# Patient Record
Sex: MALE | Race: WHITE | NOT HISPANIC OR LATINO | Employment: OTHER | URBAN - METROPOLITAN AREA
[De-identification: names, ages, dates, MRNs, and addresses within clinical notes are randomized per-mention and may not be internally consistent; named-entity substitution may affect disease eponyms.]

---

## 2022-03-08 ENCOUNTER — OFFICE VISIT (OUTPATIENT)
Dept: INTERNAL MEDICINE CLINIC | Facility: CLINIC | Age: 47
End: 2022-03-08
Payer: COMMERCIAL

## 2022-03-08 VITALS
HEART RATE: 83 BPM | HEIGHT: 69 IN | BODY MASS INDEX: 35.1 KG/M2 | OXYGEN SATURATION: 95 % | DIASTOLIC BLOOD PRESSURE: 60 MMHG | TEMPERATURE: 97.3 F | WEIGHT: 237 LBS | RESPIRATION RATE: 18 BRPM | SYSTOLIC BLOOD PRESSURE: 100 MMHG

## 2022-03-08 DIAGNOSIS — E11.9 TYPE 2 DIABETES MELLITUS WITHOUT COMPLICATION, WITHOUT LONG-TERM CURRENT USE OF INSULIN (HCC): Primary | ICD-10-CM

## 2022-03-08 DIAGNOSIS — E78.5 HYPERLIPIDEMIA, UNSPECIFIED HYPERLIPIDEMIA TYPE: ICD-10-CM

## 2022-03-08 DIAGNOSIS — M94.0 COSTOCHONDRITIS: ICD-10-CM

## 2022-03-08 DIAGNOSIS — G47.33 OBSTRUCTIVE SLEEP APNEA: ICD-10-CM

## 2022-03-08 DIAGNOSIS — F41.9 ANXIETY: ICD-10-CM

## 2022-03-08 PROCEDURE — 3725F SCREEN DEPRESSION PERFORMED: CPT | Performed by: INTERNAL MEDICINE

## 2022-03-08 PROCEDURE — 99204 OFFICE O/P NEW MOD 45 MIN: CPT | Performed by: INTERNAL MEDICINE

## 2022-03-08 PROCEDURE — 3008F BODY MASS INDEX DOCD: CPT | Performed by: INTERNAL MEDICINE

## 2022-03-08 PROCEDURE — 1036F TOBACCO NON-USER: CPT | Performed by: INTERNAL MEDICINE

## 2022-03-08 RX ORDER — METFORMIN HYDROCHLORIDE 1000 MG/1
1000 TABLET, FILM COATED, EXTENDED RELEASE ORAL
COMMUNITY
End: 2022-06-28 | Stop reason: ALTCHOICE

## 2022-03-08 RX ORDER — ATORVASTATIN CALCIUM 20 MG/1
20 TABLET, FILM COATED ORAL DAILY
COMMUNITY

## 2022-03-08 RX ORDER — VENLAFAXINE HYDROCHLORIDE 37.5 MG/1
37.5 CAPSULE, EXTENDED RELEASE ORAL DAILY
COMMUNITY
Start: 2021-11-26

## 2022-03-08 NOTE — PROGRESS NOTES
INTERNAL MEDICINE OFFICE VISIT  [facility] Internal Medicine- [place]    NAME: Lawrence Fontaine  AGE: 42 y o  SEX: male    DATE OF ENCOUNTER: 3/8/2022    Assessment and Plan     1  Type 2 diabetes mellitus without complication, without long-term current use of insulin ([facility] [address] )  - Patient reports has been diabetic for several years now and is currently on metformin    - Last hemoglobin A1C 5 6, will recheck for next office visit  - Diabetic foot exam completed today   - Referred to ophthalmology for up to date eye exam      - Ambulatory Referral to Ophthalmology; Future  - Comprehensive metabolic panel; Future  - Hemoglobin A1C; Future  - Lipid panel; Future  - CBC and differential; Future  - Microalbumin / creatinine urine ratio    2  Hyperlipidemia, unspecified hyperlipidemia type  - Notes his diet has been poor, however, continues to exercise regularly     3  Anxiety  - Continues venlafaxine, which he started last year due to anxiety    - Reports anxiety is well controlled at this time  4  Costochondritis  - Recently seen in the ED about a month ago at [facility] for complaints of chest pain and swelling at the xiphoid process  Chest X-ray was negative for any acute disease process  Determined discomfort likely musculoskeletal in nature  Was recommended NSAIDs for swelling    - Patient reports he has had swelling at the xiphoid process for multiple years but has recently worsened since he starting working out regularly a couple of months ago  - Started using angie and tumeric supplements for inflammation    - Denies any current discomfort, swelling evident on exam   Could be related to costochondritis  Continue with conservative management    5  Obstructive sleep apnea   - Per chart review, patient previously following at [facility] with mention of YURIY with CPAP use  - Did not address at visit today, will follow-up at next office visit      BMI Counseling: Body mass index is 35 kg/m²   The BMI is above normal  Nutrition recommendations include decreasing portion sizes, encouraging healthy choices of fruits and vegetables, moderation in carbohydrate intake and increasing intake of lean protein  Exercise recommendations include moderate physical activity 150 minutes/week  Rationale for BMI follow-up plan is due to patient being overweight or obese  Depression Screening and Follow-up Plan: Patient was screened for depression during today's encounter  They screened negative with a PHQ-2 score of 0  No orders of the defined types were placed in this encounter  Chief Complaint     Chief Complaint   Patient presents with    [facility]     HIV/ HEP C        History of Present Illness     Patient presents to establish care, medical history of DM2, hyperlipidemia, and anxiety  States he has not seen a consistent PCP for quite some time  Overall, denies any major concerns today  He is self-employed  Currently  with 3 sons  Denies any known allergies  Denies any drug or alcohol use  Quit smoking about 8 years ago  Reports father passed way from emphysema and mother passed away from liver disease, family history of heart disease and diabetes  Reports a history of "skin lupus" about 10 years ago for which he received steroid injections and no recurrence since    The following portions of the patient's history were reviewed and updated as appropriate: allergies, current medications, past family history, past medical history, past social history, past surgical history and problem list     Review of Systems     10 point ROS negative except per HPI    Active Problem List   There is no problem list on file for this patient        Objective     /60 (BP Location: Left arm, Patient Position: Sitting, Cuff Size: Standard)   Pulse 83   Temp (!) 97 3 °F (36 3 °C)   Resp 18   Ht 5' 9" (1 753 m)   Wt 108 kg (237 lb)   SpO2 95%   BMI 35 00 kg/m²     Physical Exam  Constitutional:       General: He is not in acute distress  Appearance: Normal appearance  He is not ill-appearing  HENT:      Head: Normocephalic and atraumatic  Right Ear: External ear normal       Left Ear: External ear normal    Eyes:      General:         Right eye: No discharge  Left eye: No discharge  Cardiovascular:      Rate and Rhythm: Normal rate and regular rhythm  Pulses: no weak pulses          Dorsalis pedis pulses are 2+ on the right side and 2+ on the left side  Heart sounds: Normal heart sounds  No murmur heard  Pulmonary:      Effort: Pulmonary effort is normal  No respiratory distress  Breath sounds: Normal breath sounds  No wheezing  Abdominal:      General: Abdomen is flat  Palpations: Abdomen is soft  Musculoskeletal:         General: No swelling or tenderness  Normal range of motion  Feet:      Right foot:      Skin integrity: No ulcer, skin breakdown, erythema, warmth, callus or dry skin  Left foot:      Skin integrity: No ulcer, skin breakdown, erythema, warmth, callus or dry skin  Skin:     General: Skin is warm and dry  Findings: No erythema  Neurological:      Mental Status: He is alert and oriented to person, place, and time  Mental status is at baseline  Psychiatric:         Mood and Affect: Mood normal          Behavior: Behavior normal          Patient's shoes and socks removed  Right Foot/Ankle   Right Foot Inspection  Skin Exam: skin normal and skin intact  No dry skin, no warmth, no callus, no erythema, no maceration, no abnormal color, no pre-ulcer, no ulcer and no callus  Toe Exam: ROM and strength within normal limits  Sensory   Vibration: intact  Monofilament testing: intact    Vascular  The right DP pulse is 2+  Left Foot/Ankle  Left Foot Inspection  Skin Exam: skin normal and skin intact  No dry skin, no warmth, no erythema, no maceration, normal color, no pre-ulcer, no ulcer and no callus       Toe Exam: ROM and strength within normal limits  Sensory   Vibration: intact  Monofilament testing: intact    Vascular  The left DP pulse is 2+  Assign Risk Category  No deformity present  No loss of protective sensation  No weak pulses  Risk: 0      Pertinent Laboratory/Diagnostic Studies:  Patient was never admitted  Images and diagnostics reviewed     Current Medications     Current Outpatient Medications:     atorvastatin (LIPITOR) 20 mg tablet, Take 20 mg by mouth daily, Disp: , Rfl:     metFORMIN (GLUMETZA) 1000 MG (MOD) 24 hr tablet, Take 1,000 mg by mouth, Disp: , Rfl:     venlafaxine (EFFEXOR-XR) 37 5 mg 24 hr capsule, Take 37 5 mg by mouth daily, Disp: , Rfl:     Health Maintenance     Health Maintenance   Topic Date Due    Hepatitis C Screening  Never done    HIV Screening  Never done    BMI: Followup Plan  Never done    Annual Physical  Never done    Depression Screening  03/08/2023    BMI: Adult  03/08/2023    DTaP,Tdap,and Td Vaccines (3 - Td or Tdap) 09/21/2025    Influenza Vaccine  Completed    COVID-19 Vaccine  Completed    Pneumococcal Vaccine: Pediatrics (0 to 5 Years) and At-Risk Patients (6 to 59 Years)  Aged Out    HIB Vaccine  Aged Out    Hepatitis B Vaccine  Aged Out    IPV Vaccine  Aged Out    Hepatitis A Vaccine  Aged Out    Meningococcal ACWY Vaccine  Aged Out    HPV Vaccine  Aged [employer] History   Administered Date(s) Administered    COVID-19 PFIZER VACCINE 0 3 ML IM 03/25/2021, 04/22/2021, 10/15/2021    Hep A, adult 09/21/2015, 05/23/2016    Hep B, adult 03/01/2016, 05/23/2016, 03/13/2017    INFLUENZA 12/30/2016, 09/20/2017, 11/26/2021    Influenza, seasonal, injectable 12/29/2010, 10/07/2011, 11/08/2013, 12/13/2014, 09/21/2015    Pneumococcal Polysaccharide PPV23 11/26/2021    Tdap 09/12/2007, 09/21/2015       JESSICA Horowitz  Internal Medicine [address]  [facility], [address] [facility]  Office: [phone]

## 2022-05-17 ENCOUNTER — OFFICE VISIT (OUTPATIENT)
Dept: INTERNAL MEDICINE CLINIC | Facility: CLINIC | Age: 47
End: 2022-05-17
Payer: COMMERCIAL

## 2022-05-17 ENCOUNTER — APPOINTMENT (OUTPATIENT)
Dept: LAB | Facility: HOSPITAL | Age: 47
End: 2022-05-17
Attending: INTERNAL MEDICINE
Payer: COMMERCIAL

## 2022-05-17 VITALS
TEMPERATURE: 97.4 F | BODY MASS INDEX: 35.99 KG/M2 | WEIGHT: 243 LBS | DIASTOLIC BLOOD PRESSURE: 66 MMHG | SYSTOLIC BLOOD PRESSURE: 112 MMHG | HEIGHT: 69 IN | OXYGEN SATURATION: 96 % | HEART RATE: 72 BPM | RESPIRATION RATE: 18 BRPM

## 2022-05-17 DIAGNOSIS — E11.9 TYPE 2 DIABETES MELLITUS WITHOUT COMPLICATION, WITHOUT LONG-TERM CURRENT USE OF INSULIN (HCC): ICD-10-CM

## 2022-05-17 DIAGNOSIS — Z11.3 ROUTINE SCREENING FOR STI (SEXUALLY TRANSMITTED INFECTION): ICD-10-CM

## 2022-05-17 DIAGNOSIS — E66.09 CLASS 2 OBESITY DUE TO EXCESS CALORIES WITHOUT SERIOUS COMORBIDITY WITH BODY MASS INDEX (BMI) OF 35.0 TO 35.9 IN ADULT: ICD-10-CM

## 2022-05-17 DIAGNOSIS — E11.9 TYPE 2 DIABETES MELLITUS WITHOUT COMPLICATION, WITHOUT LONG-TERM CURRENT USE OF INSULIN (HCC): Primary | ICD-10-CM

## 2022-05-17 LAB
ALBUMIN SERPL BCP-MCNC: 3.9 G/DL (ref 3.5–5)
ALP SERPL-CCNC: 92 U/L (ref 46–116)
ALT SERPL W P-5'-P-CCNC: 36 U/L (ref 12–78)
ANION GAP SERPL CALCULATED.3IONS-SCNC: 7 MMOL/L (ref 4–13)
AST SERPL W P-5'-P-CCNC: 24 U/L (ref 5–45)
BASOPHILS # BLD AUTO: 0.04 THOUSANDS/ΜL (ref 0–0.1)
BASOPHILS NFR BLD AUTO: 1 % (ref 0–1)
BILIRUB SERPL-MCNC: 0.34 MG/DL (ref 0.2–1)
BUN SERPL-MCNC: 9 MG/DL (ref 5–25)
CALCIUM SERPL-MCNC: 8.8 MG/DL (ref 8.3–10.1)
CHLORIDE SERPL-SCNC: 104 MMOL/L (ref 100–108)
CHOLEST SERPL-MCNC: 193 MG/DL
CO2 SERPL-SCNC: 29 MMOL/L (ref 21–32)
CREAT SERPL-MCNC: 1.01 MG/DL (ref 0.6–1.3)
CREAT UR-MCNC: 116 MG/DL
EOSINOPHIL # BLD AUTO: 0.26 THOUSAND/ΜL (ref 0–0.61)
EOSINOPHIL NFR BLD AUTO: 4 % (ref 0–6)
ERYTHROCYTE [DISTWIDTH] IN BLOOD BY AUTOMATED COUNT: 13.1 % (ref 11.6–15.1)
GFR SERPL CREATININE-BSD FRML MDRD: 89 ML/MIN/1.73SQ M
GLUCOSE P FAST SERPL-MCNC: 98 MG/DL (ref 65–99)
HBV CORE AB SER QL: NORMAL
HBV CORE IGM SER QL: NORMAL
HBV SURFACE AG SER QL: NORMAL
HCT VFR BLD AUTO: 39.9 % (ref 36.5–49.3)
HCV AB SER QL: NORMAL
HDLC SERPL-MCNC: 43 MG/DL
HGB BLD-MCNC: 13.3 G/DL (ref 12–17)
IMM GRANULOCYTES # BLD AUTO: 0.01 THOUSAND/UL (ref 0–0.2)
IMM GRANULOCYTES NFR BLD AUTO: 0 % (ref 0–2)
LDLC SERPL CALC-MCNC: 134 MG/DL (ref 0–100)
LYMPHOCYTES # BLD AUTO: 2.06 THOUSANDS/ΜL (ref 0.6–4.47)
LYMPHOCYTES NFR BLD AUTO: 35 % (ref 14–44)
MCH RBC QN AUTO: 28.5 PG (ref 26.8–34.3)
MCHC RBC AUTO-ENTMCNC: 33.3 G/DL (ref 31.4–37.4)
MCV RBC AUTO: 85 FL (ref 82–98)
MICROALBUMIN UR-MCNC: 5.8 MG/L (ref 0–20)
MICROALBUMIN/CREAT 24H UR: 5 MG/G CREATININE (ref 0–30)
MONOCYTES # BLD AUTO: 0.47 THOUSAND/ΜL (ref 0.17–1.22)
MONOCYTES NFR BLD AUTO: 8 % (ref 4–12)
NEUTROPHILS # BLD AUTO: 3.08 THOUSANDS/ΜL (ref 1.85–7.62)
NEUTS SEG NFR BLD AUTO: 52 % (ref 43–75)
NONHDLC SERPL-MCNC: 150 MG/DL
NRBC BLD AUTO-RTO: 0 /100 WBCS
PLATELET # BLD AUTO: 292 THOUSANDS/UL (ref 149–390)
PMV BLD AUTO: 9.9 FL (ref 8.9–12.7)
POTASSIUM SERPL-SCNC: 4.2 MMOL/L (ref 3.5–5.3)
PROT SERPL-MCNC: 7.5 G/DL (ref 6.4–8.2)
RBC # BLD AUTO: 4.67 MILLION/UL (ref 3.88–5.62)
SODIUM SERPL-SCNC: 140 MMOL/L (ref 136–145)
TRIGL SERPL-MCNC: 82 MG/DL
WBC # BLD AUTO: 5.92 THOUSAND/UL (ref 4.31–10.16)

## 2022-05-17 PROCEDURE — 85025 COMPLETE CBC W/AUTO DIFF WBC: CPT

## 2022-05-17 PROCEDURE — 87340 HEPATITIS B SURFACE AG IA: CPT

## 2022-05-17 PROCEDURE — 86705 HEP B CORE ANTIBODY IGM: CPT

## 2022-05-17 PROCEDURE — 86592 SYPHILIS TEST NON-TREP QUAL: CPT

## 2022-05-17 PROCEDURE — 80061 LIPID PANEL: CPT

## 2022-05-17 PROCEDURE — 86704 HEP B CORE ANTIBODY TOTAL: CPT

## 2022-05-17 PROCEDURE — 36415 COLL VENOUS BLD VENIPUNCTURE: CPT

## 2022-05-17 PROCEDURE — 82570 ASSAY OF URINE CREATININE: CPT | Performed by: INTERNAL MEDICINE

## 2022-05-17 PROCEDURE — 86803 HEPATITIS C AB TEST: CPT

## 2022-05-17 PROCEDURE — 87491 CHLMYD TRACH DNA AMP PROBE: CPT

## 2022-05-17 PROCEDURE — 99214 OFFICE O/P EST MOD 30 MIN: CPT | Performed by: INTERNAL MEDICINE

## 2022-05-17 PROCEDURE — 82043 UR ALBUMIN QUANTITATIVE: CPT | Performed by: INTERNAL MEDICINE

## 2022-05-17 PROCEDURE — 3061F NEG MICROALBUMINURIA REV: CPT | Performed by: INTERNAL MEDICINE

## 2022-05-17 PROCEDURE — 3008F BODY MASS INDEX DOCD: CPT | Performed by: INTERNAL MEDICINE

## 2022-05-17 PROCEDURE — 87389 HIV-1 AG W/HIV-1&-2 AB AG IA: CPT

## 2022-05-17 PROCEDURE — 80053 COMPREHEN METABOLIC PANEL: CPT

## 2022-05-17 PROCEDURE — 1036F TOBACCO NON-USER: CPT | Performed by: INTERNAL MEDICINE

## 2022-05-17 PROCEDURE — 87591 N.GONORRHOEAE DNA AMP PROB: CPT

## 2022-05-17 PROCEDURE — 83036 HEMOGLOBIN GLYCOSYLATED A1C: CPT

## 2022-05-17 NOTE — PROGRESS NOTES
INTERNAL MEDICINE OFFICE VISIT  [facility] Internal Medicine- [place]    NAME: Jose Murphy  AGE: 41 y o  SEX: male    DATE OF ENCOUNTER: 5/17/2022    Assessment and Plan     1  Type 2 diabetes mellitus without complication, without long-term current use of insulin (HCC)  - currently on metformin monotherapy  We will add on Ozempic as below for management of type 2 diabetes and obesity    - Last hemoglobin A1C 5 6, repeat A1c has been requested  - up-to-date on foot exam  -previously Referred to ophthalmology for eye exam   Needs to schedule      - Semaglutide,0 25 or 0 5MG/DOS, 2 MG/1 5ML SOPN; Inject 0 25 mg under the skin every 7 days for 30 days, THEN 0 5 mg every 7 days  Dispense: 4 5 mL; Refill: 0    2  Class 2 obesity due to excess calories without serious comorbidity with body mass index (BMI) of 35 0 to 35 9 in adult  -Weight loss, healthy dietary and lifestyle choices encouraged  - we discussed pharmacologic weight loss options  He is interested in injectable therapy such as Ozempic which would be beneficial for him especially given his diagnosis of type 2 diabetes  -counseled briefly on common side effects including GI upset, bloating, abdominal pain  -Goal is for at least 5% weight loss over the course of 3 months  -we will also refer to the weight management clinic      - Ambulatory Referral to Weight Management; Future    3  Routine screening for STI (sexually transmitted infection)  -he requests routine screening for STDs  He has a female partner  He notes he has a prior history of vasectomy  Intermittent use of condoms  He denies any dysuria, penile discharge, rashes, or ulcers    - HIV 1/2 Antigen/Antibody (4th Generation) w Reflex [facility]; Future  - RPR; Future  - Chlamydia/GC amplified DNA by PCR; Future  - Chronic Hepatitis Panel;  Future                   Orders Placed This Encounter   Procedures    Chlamydia/GC amplified DNA by PCR    HIV 1/2 Antigen/Antibody (4th Generation) w Reflex [facility]    RPR    Chronic Hepatitis Panel    Ambulatory Referral to Weight Management       Chief Complaint     Chief Complaint   Patient presents with    Weight Loss     Std test/ hep c hiv tdsp          History of Present Illness     Ameya Alcantar comes in today for follow-up  Medical history of type 2 diabetes, hyperlipidemia, anxiety, obesity, former smoker, YURIY    The following portions of the patient's history were reviewed and updated as appropriate: allergies, current medications, past family history, past medical history, past social history, past surgical history and problem list     Review of Systems     10 point ROS negative except per HPI    Active Problem List     Patient Active Problem List   Diagnosis    Type 2 diabetes mellitus without complication, without long-term current use of insulin ([facility] [address] )    Hyperlipidemia    Anxiety    Obstructive sleep apnea       Objective     /66 (BP Location: Left arm, Patient Position: Sitting, Cuff Size: Standard)   Pulse 72   Temp (!) 97 4 °F (36 3 °C)   Resp 18   Ht 5' 9" (1 753 m)   Wt 110 kg (243 lb)   SpO2 96%   BMI 35 88 kg/m²     Physical Exam  Constitutional:       Appearance: Normal appearance  He is obese  He is not ill-appearing  HENT:      Head: Normocephalic and atraumatic  Eyes:      General: No scleral icterus  Right eye: No discharge  Left eye: No discharge  Cardiovascular:      Rate and Rhythm: Normal rate and regular rhythm  Heart sounds: No murmur heard  No friction rub  Pulmonary:      Effort: Pulmonary effort is normal       Breath sounds: Normal breath sounds  No wheezing or rales  Abdominal:      General: Abdomen is flat  There is no distension  Palpations: Abdomen is soft  Tenderness: There is no abdominal tenderness  Musculoskeletal:         General: No swelling or tenderness  Skin:     General: Skin is warm and dry  Findings: No erythema     Neurological:      Mental Status: He is alert  Mental status is at baseline  Motor: No weakness  Psychiatric:         Mood and Affect: Mood normal          Behavior: Behavior normal          Pertinent Laboratory/Diagnostic Studies:  Patient was never admitted  Images and diagnostics reviewed     Current Medications     Current Outpatient Medications:     atorvastatin (LIPITOR) 20 mg tablet, Take 20 mg by mouth daily, Disp: , Rfl:     metFORMIN (GLUMETZA) 1000 MG (MOD) 24 hr tablet, Take 1,000 mg by mouth, Disp: , Rfl:     Semaglutide,0 25 or 0 5MG/DOS, 2 MG/1 5ML SOPN, Inject 0 25 mg under the skin every 7 days for 30 days, THEN 0 5 mg every 7 days  , Disp: 4 5 mL, Rfl: 0    venlafaxine (EFFEXOR-XR) 37 5 mg 24 hr capsule, Take 37 5 mg by mouth daily, Disp: , Rfl:     Health Maintenance     Health Maintenance   Topic Date Due    Hepatitis C Screening  Never done    DM Eye Exam  Never done    URINE MICROALBUMIN  Never done    HIV Screening  Never done    Annual Physical  Never done    DTaP,Tdap,and Td Vaccines (1 - Tdap) 09/21/2015    Colorectal Cancer Screening  04/21/2022    HEMOGLOBIN A1C  05/26/2022    Pneumococcal Vaccine: Pediatrics (0 to 5 Years) and At-Risk Patients (6 to 59 Years) (2 - PCV) 11/26/2022    Depression Screening  03/08/2023    BMI: Followup Plan  03/08/2023    Diabetic Foot Exam  03/08/2023    BMI: Adult  05/17/2023    Influenza Vaccine  Completed    COVID-19 Vaccine  Completed    HIB Vaccine  Aged Out    Hepatitis B Vaccine  Aged Out    IPV Vaccine  Aged Out    Hepatitis A Vaccine  Aged Out    Meningococcal ACWY Vaccine  Aged Out    HPV Vaccine  Aged Out     Immunization History   Administered Date(s) Administered    COVID-19 PFIZER VACCINE 0 3 ML IM 03/25/2021, 04/22/2021, 10/15/2021    Hep A, adult 09/21/2015, 05/23/2016    Hep B, adult 03/01/2016, 05/23/2016, 03/13/2017    INFLUENZA 12/30/2016, 09/20/2017, 11/26/2021    Influenza, seasonal, injectable 12/29/2010, 10/07/2011, 11/08/2013, 12/13/2014, 09/21/2015    Pneumococcal Polysaccharide PPV23 11/26/2021    Tdap 09/12/2007, 09/21/2015       JESSICA Maravilla  Internal Medicine [place] [address] [address], [facility] [address]  [place], [address] [facility]  Office: [phone]

## 2022-05-18 LAB
EST. AVERAGE GLUCOSE BLD GHB EST-MCNC: 114 MG/DL
HBA1C MFR BLD: 5.6 %
HIV 1+2 AB+HIV1 P24 AG SERPL QL IA: NORMAL
RPR SER QL: NORMAL

## 2022-05-18 PROCEDURE — 3044F HG A1C LEVEL LT 7.0%: CPT | Performed by: INTERNAL MEDICINE

## 2022-05-19 ENCOUNTER — NURSE TRIAGE (OUTPATIENT)
Dept: OTHER | Facility: OTHER | Age: 47
End: 2022-05-19

## 2022-05-19 LAB
C TRACH DNA SPEC QL NAA+PROBE: NEGATIVE
N GONORRHOEA DNA SPEC QL NAA+PROBE: NEGATIVE

## 2022-05-19 NOTE — TELEPHONE ENCOUNTER
Regarding: took too much medication - already spoke with poison control  ----- Message from Flako Dyer sent at 5/19/2022  6:44 PM EDT -----  "he gave me a medication to inject (Ozempic) I injected too much, I called poison control and they told me to call the doctor"

## 2022-05-19 NOTE — TELEPHONE ENCOUNTER
Reason for Disposition   [1] DOUBLE DOSE (an extra dose or lesser amount) of prescription drug AND [2] NO symptoms (Exception: a double dose of antibiotics)    Answer Assessment - Initial Assessment Questions  1  NAME of MEDICATION: "What medicine are you calling about?"      ozempic  2  QUESTION: "What is your question?" (e g , medication refill, side effect)      Did I take it to much  3  PRESCRIBING HCP: "Who prescribed it?" Reason: if prescribed by specialist, call should be referred to that group  Dr Marcia Cm  4  SYMPTOMS: "Do you have any symptoms?"      none  5  SEVERITY: If symptoms are present, ask "Are they mild, moderate or severe?"      He feels normal   Unsure if he took the dose   25 mg once a week and a  50 mg       Poison control said to call his pcp but he advised to stay hydrated      Protocols used: MEDICATION QUESTION CALL-ADULT-

## 2022-05-19 NOTE — TELEPHONE ENCOUNTER
From Dr Matt Maddox        I agree with hydration  If he develops abdominal pain, nausea, or vomiting he should let me know or go to the ER if severe  He should go back to the appropriate dose of 0 25 mg weekly next week as scheduled as long as hes feeling ok

## 2022-06-08 LAB
LEFT EYE DIABETIC RETINOPATHY: NORMAL
RIGHT EYE DIABETIC RETINOPATHY: NORMAL

## 2022-06-08 PROCEDURE — 2023F DILAT RTA XM W/O RTNOPTHY: CPT | Performed by: NURSE PRACTITIONER

## 2022-06-28 ENCOUNTER — APPOINTMENT (OUTPATIENT)
Dept: LAB | Facility: MEDICAL CENTER | Age: 47
End: 2022-06-28
Attending: SURGERY
Payer: COMMERCIAL

## 2022-06-28 ENCOUNTER — OFFICE VISIT (OUTPATIENT)
Dept: BARIATRICS | Facility: CLINIC | Age: 47
End: 2022-06-28

## 2022-06-28 ENCOUNTER — OFFICE VISIT (OUTPATIENT)
Dept: BARIATRICS | Facility: CLINIC | Age: 47
End: 2022-06-28
Payer: COMMERCIAL

## 2022-06-28 VITALS
HEART RATE: 72 BPM | RESPIRATION RATE: 16 BRPM | DIASTOLIC BLOOD PRESSURE: 70 MMHG | SYSTOLIC BLOOD PRESSURE: 110 MMHG | BODY MASS INDEX: 35.13 KG/M2 | WEIGHT: 245.4 LBS | HEIGHT: 70 IN

## 2022-06-28 VITALS — HEIGHT: 70 IN | BODY MASS INDEX: 35.13 KG/M2 | WEIGHT: 245.4 LBS

## 2022-06-28 DIAGNOSIS — E66.09 CLASS 2 OBESITY DUE TO EXCESS CALORIES WITHOUT SERIOUS COMORBIDITY WITH BODY MASS INDEX (BMI) OF 35.0 TO 35.9 IN ADULT: ICD-10-CM

## 2022-06-28 DIAGNOSIS — R63.5 ABNORMAL WEIGHT GAIN: ICD-10-CM

## 2022-06-28 DIAGNOSIS — Z01.818 PREOPERATIVE CLEARANCE: ICD-10-CM

## 2022-06-28 DIAGNOSIS — G47.33 OBSTRUCTIVE SLEEP APNEA: ICD-10-CM

## 2022-06-28 DIAGNOSIS — E11.9 TYPE 2 DIABETES MELLITUS WITHOUT COMPLICATION, WITHOUT LONG-TERM CURRENT USE OF INSULIN (HCC): Primary | ICD-10-CM

## 2022-06-28 DIAGNOSIS — Z12.11 SCREENING FOR COLON CANCER: ICD-10-CM

## 2022-06-28 DIAGNOSIS — E78.5 HYPERLIPIDEMIA, UNSPECIFIED HYPERLIPIDEMIA TYPE: ICD-10-CM

## 2022-06-28 DIAGNOSIS — F41.9 ANXIETY: ICD-10-CM

## 2022-06-28 DIAGNOSIS — E11.9 TYPE 2 DIABETES MELLITUS WITHOUT COMPLICATION, WITHOUT LONG-TERM CURRENT USE OF INSULIN (HCC): ICD-10-CM

## 2022-06-28 DIAGNOSIS — Z01.812 BLOOD TESTS PRIOR TO TREATMENT OR PROCEDURE: ICD-10-CM

## 2022-06-28 DIAGNOSIS — E66.9 OBESITY, CLASS II, BMI 35-39.9: Primary | ICD-10-CM

## 2022-06-28 PROBLEM — E66.812 OBESITY, CLASS II, BMI 35-39.9: Status: ACTIVE | Noted: 2022-06-28

## 2022-06-28 LAB — TSH SERPL DL<=0.05 MIU/L-ACNC: 1.12 UIU/ML (ref 0.45–4.5)

## 2022-06-28 PROCEDURE — 84443 ASSAY THYROID STIM HORMONE: CPT

## 2022-06-28 PROCEDURE — 36415 COLL VENOUS BLD VENIPUNCTURE: CPT

## 2022-06-28 PROCEDURE — RECHECK: Performed by: DIETITIAN, REGISTERED

## 2022-06-28 PROCEDURE — 99204 OFFICE O/P NEW MOD 45 MIN: CPT | Performed by: NURSE PRACTITIONER

## 2022-06-28 PROCEDURE — 3008F BODY MASS INDEX DOCD: CPT | Performed by: NURSE PRACTITIONER

## 2022-06-28 PROCEDURE — 1036F TOBACCO NON-USER: CPT | Performed by: NURSE PRACTITIONER

## 2022-06-28 NOTE — ASSESSMENT & PLAN NOTE
- Taking metformin  May improve with weight loss and lifestyle modification  Continue management with prescribing provider    - Discussed restarting Ozempic         Lab Results   Component Value Date    HGBA1C 5 6 05/17/2022

## 2022-06-28 NOTE — ASSESSMENT & PLAN NOTE
- Discussed options of HealthyCORE-Intensive Lifestyle Intervention Program, Very Low Calorie Diet-VLCD and Conservative Program and the role of weight loss medications  His insurance would not cover weight loss surgery  - Explained the importance of making lifestyle changes first before starting any anti-obesity medications  Patient should demonstrate lifestyle changes first before anti-obesity medication can be initiated  - Patient is interested in pursuing Conservative Program  - Initial weight loss goal of 5-10% weight loss for improved health  - Weight loss can improve patient's co-morbid conditions and/or prevent weight-related complications  - Discussed Effexor could potentially contribute to weight gain  Advised not to stop medication, but discuss with prescribing provider    - Labs reviewed: A1C, CMP, and lipid panel 5/17/2022  LDL increased, which will likely improve with weight loss and lifestyle modification  Otherwise, labs within acceptable range  - Check TSH    - Already on metformin 1000 mg twice a day  - Started on Ozempic 0 25 mg weekly about one month ago by his PCP  Had good appetite suppression on that  Stopped after 3 weeks due to diarrhea, but had also been taking an over-the-counter supplement from the gym, hence it is unclear if it was related to that  He stopped the supplement  - Can try restarting Ozempic 0 25 mg weekly for 4 weekly and if tolerated increase to 0 5 mg    - Patient denies personal history of pancreatitis  Patient also denies personal and family history of medullary thyroid cancer and multiple endocrine neoplasia type 2 (MEN 2 tumor)  Goals:  Do not skip meals  Food log (ie ) www myfitnesspal com,sparkpeople  com,loseit com,calorieking  com,etc  baritastic (use skinnytaste  com, dietdoctor  com or smartphone jose e ShoppinPal for recipes)  No sugary beverages  Increase water intake at least 64oz of water daily  Increase physical activity by 10 minutes daily  Gradually increase physical activity to a goal of 5 days per week for 30 minutes of MODERATE intensity PLUS 2 days per week of FULL BODY resistance training (use smartphone apps FitON, Home Workout, etc )  - Start food logging, weighing and measuring including beverages  - Eliminate sugary beverages and increase water intake  - Start walking daily for 15 minutes  - [id number] calories per day  Sample menu given

## 2022-06-28 NOTE — PROGRESS NOTES
Bariatric Behavioral Health Evaluation    Presenting Problem:  Selvin Antunez  is a 41 y o    male    :  1975   Patient presented with overall concerns of obesity  Stated that weight has impacted quality of life and concerned with lack of mobility, chronic pain, and overall health  Has attempted various weight loss plans in the past    Patient is Interested in exploring bariatric surgery as an option for  weight loss goals to improve health, increase activity and prevent family disease  The pt shared he is currently undecided about the surgery, but would like to explore his options  The patient reports he has no  self control with food  He explained in  Dec he lost weight and then re-gained it because of unhealthy eating habits  The patient reports  he is also an overeater  Recently he had been going to the gym and watched what he was  eating, but this did not last because he can not be consistent  Feeling  everything that is bad food wise taste good and hard to give up  The patients mother had the surgery, but she had side effects and gained the weight back  The patient reported he did some research on weight lose and shared many programs did not work  He  shared he saw the billboard on surgery and this was what prompted him to schedule an appointment  Is the patient seeking Bariatric Surgery Eval? Yes  Realizes Post- Op Requirements? Yes  Pre-morbid level of function and history of present illness: Patient has health issues  Psychiatric/Psychological Treatment Diagnosis: Patient reports no Mental Health diagnosis and no prescribed medications at this time  Symptoms are stable at this time  Encouraged to discuss medication with practitioner post surgery  Outpatient Counselor 30 years ago as a teen, but not currently    Psychiatrist no  Have you had Inpatient Treatment? no      Risk Assessment: Patient denies any SI/HI, no audio or visual hallucination    Observation:   This interview only      Access to weapons : no    Drug and/or Alcohol treatment history: no    Tobacco History: quit 10 years  Domestic Violence no      Abuse History:  no      Physical/Psychological Assessment:     Appearance: appropriate  Sociability: average  Affect: appropriate  Mood: calm  Thought Process: coherent  Speech: normal  Content: no impairment  Orientation: person  Yes, place  Yes, time  Yes, normal attention span  Yes, normal memory  Yes   and normal judgement  Yes   Insight: emotional  good       Note :  Patient presented for behavioral health evaluation for the bariatric program  Patient denies Axis I diagnosis and treatment  Patient educated regarding the impact of nicotine and alcohol on the post bariatric surgery patient  Discussed preventing pregnancy for 18 months after bariatric surgery  Patient has a positive family history of obesity  Workflow reviewed  Patient meets criteria for surgery at this program and is referred to the physician  Family constellation: Rents a room currently, but moving out this Friday to his own apt  The pt shared he eats out a lot, but when he moves out he will cook more at home  The pt shared he is an over eater and enjoys  "junk" loves it and eats late at night, skips breakfast, drinks soda and currently not drinking water  Explained possible stress eater  Hx of bad habits came from his childhood  Chips,  ice cream,  soda many  carb's and  no restrictions on junk food  Family hx of MH/Substance abuse/medical : unknown    Work and work hours:60-70 hours a week  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    Activity: stopped going to the gym, at work reports its a physical workout, but currently this is all he is doing for activity        Additional comments/stressors related to family/relationships/peer support: Controlled everyday stress   Son is aware of the patient thinking about surgery,  but reports his son does not want his to have it because of the possible side effects and hx of his GM having the surgery  Review  Educated and handouts provided  Adequate hydration  Exercise  Meal planning and preparation   lifestyle changes  Possible problems with poor eating habits  Techniques for self monitoring and keeping daily food journal  Workflow      Goal: 1- follow the 30/60 rule    Susi Long M S W   L S W   _____________________________      BARIATRIC SURGERY EDUCATION CHECKLIST     I have received education related to my bariatric surgery process and understand:     Patients may be required to complete a psychiatric evaluation and receive clearance for surgery from their psychiatrist      Patients who undergo weight loss surgery are at higher risk of increased mental health concerns and suicide attempts  Patients may be required to complete a full substance abuse evaluation and then complete all treatment recommendations prior to surgery  If diagnosis of abuse/dependence results, patient may be required to remain sober for one (1) year before having bariatric surgery  Patients on psychiatric medications should check with their provider to discuss psychiatric medications and the changes in absorption  Patient should discuss all time release medications with provider and take all medications as prescribed  The recommendation is that there is no use of  any tobacco products, Hookah or  vapes for the bariatric post-operation patient  Bariatric surgery patients should not consume alcohol as a post-operative patient as it may increase risk of numerous health conditions including but not limited to alcohol abuse and ulcers  There is a possibility of weight regain if patient does not follow all program guidelines and recommendations  Bariatric surgery patients should exercise thirty (30) to sixty (60) minutes per day to maintain post-surgical weight loss       Research indicates that bariatric patients are more successful when they see a therapist for up to two (2) years post-op  Patients will follow all medical and dietary recommendations provided  Patient will keep all scheduled appointments and follow up with their physician for a minimum of five (5) years  Patient will take all vitamins as recommended  Post-operative vitamins are life-long  Patient reviewed Bariatric Surgery Education Checklist and agrees they have received education on these issues

## 2022-06-28 NOTE — PROGRESS NOTES
Assessment/Plan:    Obesity, Class II, BMI 35-39 9  - Discussed options of HealthyCORE-Intensive Lifestyle Intervention Program, Very Low Calorie Diet-VLCD and Conservative Program and the role of weight loss medications  His insurance would not cover weight loss surgery  - Explained the importance of making lifestyle changes first before starting any anti-obesity medications  Patient should demonstrate lifestyle changes first before anti-obesity medication can be initiated  - Patient is interested in pursuing Conservative Program  - Initial weight loss goal of 5-10% weight loss for improved health  - Weight loss can improve patient's co-morbid conditions and/or prevent weight-related complications  - Discussed Effexor could potentially contribute to weight gain  Advised not to stop medication, but discuss with prescribing provider    - Labs reviewed: A1C, CMP, and lipid panel 5/17/2022  LDL increased, which will likely improve with weight loss and lifestyle modification  Otherwise, labs within acceptable range  - Check TSH    - Already on metformin 1000 mg twice a day  - Started on Ozempic 0 25 mg weekly about one month ago by his PCP  Had good appetite suppression on that  Stopped after 3 weeks due to diarrhea, but had also been taking an over-the-counter supplement from the gym, hence it is unclear if it was related to that  He stopped the supplement  - Can try restarting Ozempic 0 25 mg weekly for 4 weekly and if tolerated increase to 0 5 mg    - Patient denies personal history of pancreatitis  Patient also denies personal and family history of medullary thyroid cancer and multiple endocrine neoplasia type 2 (MEN 2 tumor)  Goals:  Do not skip meals  Food log (ie ) www myfitnesspal com,sparkpeople  com,loseit com,calorieking  com,etc  baritastic (use skinnytaste  com, dietdoctor  com or smartphone jose e [employer] for recipes)  No sugary beverages   Increase water intake at least 64oz of water daily  Increase physical activity by 10 minutes daily  Gradually increase physical activity to a goal of 5 days per week for 30 minutes of MODERATE intensity PLUS 2 days per week of FULL BODY resistance training (use smartphone apps Appcelerator, Home Workout, etc )  - Start food logging, weighing and measuring including beverages  - Eliminate sugary beverages and increase water intake  - Start walking daily for 15 minutes  - 4206-3732 calories per day  Sample menu given  Anxiety  - Taking Effexor  Continue management with prescribing provider  Hyperlipidemia  - Taking atorvastatin  May improve with weight loss and lifestyle modification  Continue management with prescribing provider  Obstructive sleep apnea  - Continue regular use of CPAP  - may improve with weight loss  Type 2 diabetes mellitus without complication, without long-term current use of insulin ([facility])  - Taking metformin  May improve with weight loss and lifestyle modification  Continue management with prescribing provider    - Discussed restarting Ozempic  Lab Results   Component Value Date    HGBA1C 5 6 05/17/2022        [place] was seen today for consult  Diagnoses and all orders for this visit:    Obesity, Class II, BMI 35-39 9    Screening for colon cancer  -     Ambulatory referral to Gastroenterology; Future    Hyperlipidemia, unspecified hyperlipidemia type    Type 2 diabetes mellitus without complication, without long-term current use of insulin ([facility] [address] )    Obstructive sleep apnea    Anxiety          Follow up in approximately 2 months with Non-Surgical Physician/Advanced Practitioner  Subjective:   Chief Complaint   Patient presents with    Consult     MWM consult; waist 47in; goal wt 190; pt has sleep apnea       Patient ID: Selvin Antunez  is a 41 y o  male with excess weight/obesity here to pursue weight management  Previous notes and records have been reviewed      Past Medical History: Diagnosis Date    Diabetes ([facility] [address] )     Hyperlipidemia     YURIY on CPAP      Past Surgical History:   Procedure Laterality Date    VASECTOMY         HPI:  Wt Readings from Last 20 Encounters:   06/28/22 111 kg (245 lb 6 4 oz)   06/28/22 111 kg (245 lb 6 4 oz)   05/17/22 110 kg (243 lb)   03/08/22 108 kg (237 lb)     Obesity/Excess Weight:  Severity: Moderate  Onset:  10 years    Modifiers: Diet and Exercise and Over the Counter Weight Loss Supplements  Contributing factors: Poor Food Choices and Insufficient Physical Activity  Associated symptoms: increased joint pain, increased shortness of breath, decreased mobility and clothes do not fit     Had surgical evaluation, but insurance would not cover it, hence referred for medical weight management  On metformin for several years ago, no negative side effects  No effect on appetite  Ozempic started one month ago by his primary care provider  Took 0 25 mg weekly for 3 weeks, then stopped due to diarrhea  Was also taking a supplement from the gym, hence he is not sure which one was causing  Ozempic helped reduce appetite  Hydration: 90 oz soda daily, 80 oz sweet tea  Alcohol: none  Smoking: denies  Exercise: none  Occupation: sells commercial kitchen equipment  Sleep: 6-7 hours  STOP bang: YURIY uses CPAP regularly     Highest weight: 300 lbs one year ago  Current weight: 245 4 lbs  Goal weight: 190 lbs    Colonoscopy: due, referral placed    The following portions of the patient's history were reviewed and updated as appropriate: allergies, current medications, past family history, past medical history, past social history, past surgical history, and problem list     Review of Systems   HENT: Negative for sore throat  Respiratory: Negative for cough and shortness of breath  Cardiovascular: Negative for chest pain and palpitations     Gastrointestinal: Negative for constipation, diarrhea, nausea and vomiting         + GERD diet controlled   Endocrine: Negative for cold intolerance and heat intolerance  Genitourinary: Negative for dysuria  Musculoskeletal: Negative for arthralgias and back pain  Skin: Negative for rash  Neurological: Negative for headaches  Psychiatric/Behavioral: Negative for suicidal ideas (or HI)  Denies depression and anxiety       Objective:  /70   Pulse 72   Resp 16   Ht 5' 9 5" (1 765 m)   Wt 111 kg (245 lb 6 4 oz)   BMI 35 72 kg/m²     Physical Exam  Vitals and nursing note reviewed  Constitutional   General appearance: Abnormal   well developed and obese  Eyes No conjunctival injection  Ears, Nose, Mouth, and Throat Oral mucosa moist    Pulmonary   Respiratory effort: No increased work of breathing or signs of respiratory distress  Cardiovascular     Examination of extremities for edema and/or varicosities: Normal   no edema  Abdomen   Abdomen: Abnormal   The abdomen was obese  Musculoskeletal   Gait and station: Normal     Psychiatric   Orientation to person, place and time: Normal     Affect: appropriate      Labs  Recent labs have been personally reviewed      Lab Results   Component Value Date    SODIUM 140 05/17/2022    K 4 2 05/17/2022     05/17/2022    CO2 29 05/17/2022    AGAP 7 05/17/2022    BUN 9 05/17/2022    CREATININE 1 01 05/17/2022    GLUF 98 05/17/2022    CALCIUM 8 8 05/17/2022    AST 24 05/17/2022    ALT 36 05/17/2022    ALKPHOS 92 05/17/2022    TP 7 5 05/17/2022    TBILI 0 34 05/17/2022    EGFR 89 05/17/2022     Lab Results   Component Value Date    HGBA1C 5 6 05/17/2022     Lab Results   Component Value Date    CHOLESTEROL 193 05/17/2022     Lab Results   Component Value Date    HDL 43 05/17/2022     Lab Results   Component Value Date    TRIG 82 05/17/2022     Lab Results   Component Value Date    LDLCALC 134 (H) 05/17/2022

## 2022-06-28 NOTE — PROGRESS NOTES
Bariatric Nutrition Assessment Note    Type of surgery    Preop no monthly requirement  Surgery Date: TBD- Tentative November-December 2022  Deadline month March 2023  Surgeon: KENAN    Nutrition Assessment   Jesus Meghana  41 y o   male     Wt with BMI of 25: 172 7lbs  Pre-Op Excess Wt: 72 7lbs  Ht 5' 9 5" (1 765 m)   Wt 111 kg (245 lb 6 4 oz)   BMI 35 72 kg/m²     [place]- [place]  [place] Equation:     Weight [id number]=1460 kcal/day  Estimated calories for weight loss 3658-9916 kcal/day ( 1-2# per wk wt loss - sedentary )  Estimated protein needs 78 5-94 2 g/day (1 0-1 2 gms/kg IBW )   Estimated fluid needs 1897-2402 ml/day (30-35 ml/kg IBW )      Weight History   Onset of Obesity: Adult: for only about the past ten years  Family history of obesity: Yes: mom had bariatric surgery  Wt Loss Attempts: Exercise  OTC meds/supplements  Self Created Diets (Portion Control, Healthy Food Choices, etc )  Patient has tried the above for 6 months or more with insufficient weight loss or weight regain, which is why patient has requested to be evaluated for weight loss surgery today  Maximum Wt Lost: lost 25lbs with exercise and self-created diet    Review of History and Medications   No past medical history on file  No past surgical history on file    Social History     Socioeconomic History    Marital status: /Civil Union     Spouse name: Not on file    Number of children: Not on file    Years of education: Not on file    Highest education level: Not on file   Occupational History    Not on file   Tobacco Use    Smoking status: Former Smoker     Packs/day: 3 00     Types: Cigarettes    Smokeless tobacco: Never Used    Tobacco comment: 8 YRS AGO   Substance and Sexual Activity    Alcohol use: Never    Drug use: Not on file    Sexual activity: Yes   Other Topics Concern    Not on file   Social History Narrative    Not on file     Social Determinants of Health     Financial Resource Strain: Not on file   Food Insecurity: Not on file   Transportation Needs: Not on file   Physical Activity: Not on file   Stress: Not on file   Social Connections: Not on file   Intimate Partner Violence: Not on file   Housing Stability: Not on file       Current Outpatient Medications:     atorvastatin (LIPITOR) 20 mg tablet, Take 20 mg by mouth daily, Disp: , Rfl:     metFORMIN (GLUMETZA) 1000 MG (MOD) 24 hr tablet, Take 1,000 mg by mouth, Disp: , Rfl:     Semaglutide,0 25 or 0 5MG/DOS, 2 MG/1 5ML SOPN, Inject 0 25 mg under the skin every 7 days for 30 days, THEN 0 5 mg every 7 days  , Disp: 4 5 mL, Rfl: 0    venlafaxine (EFFEXOR-XR) 37 5 mg 24 hr capsule, Take 37 5 mg by mouth daily, Disp: , Rfl:      Food Intake and Lifestyle Assessment   Food Intake Assessment completed via usual diet recall  Breakfast: skips  Regular soda or sweet tea  Snack: none   Lunch: taco bell: burrito, sometimes a soda  Snack: soda or tea  Dinner: Groupon or [email or web address] or [employer] or [employer]  Snack: ice cream  Beverage intake: regular soda and sweet tea  Protein supplement: none  Just bought a protein powder: Lean Whey Revolution  , havent tried it yet  [employer] protein powder  Estimated protein intake per day: 60-90g  Estimated fluid intake per day: >64oz  Meals eaten away from home: all lunches and dinners=14 per week  Typical meal pattern: 2 meals per day and 1 snacks per day  Eating Behaviors: Consumption of high calorie/ high fat foods, Consumption of high calorie beverages, Large portion sizes, Craves sweet foods and reports ice cream and soda/tea as weaknesses  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  Food allergies or intolerances: No Known Allergies NKFA  Cultural or Catholic considerations: none    Physical Assessment  Physical Activity  Types of exercise: None  Was going to gym 4 days per week:  Weight training, little cardio: tradmill    Current physical limitations: none    Psychosocial Assessment   Support systems: lives alone  Currently staying in an air bnb,  Moving into new apartment this Friday  Lived in this area since October  Previously lived about 2 hours away  Moved for work  Socioeconomic factors: 20yo son works with him: doesn't want him to have surgery  Pt reports he quit smoking about 8 years ago  Pt reports his son who lives with him sometimes vapes around him  Nutrition Diagnosis  Diagnosis: Overweight / Obesity (NC-3 3), Excessive energy intake (NI-1 5), Excessive fat intake (NI-5 6 2), Inappropriate intake of carbohydrates (NI-5 8 3) and Undesirable food choices (NB-1 7)  Related to: Food and nutrition-related knowledge deficit, Physical inactivity, Excessive energy intake and Inability or lack of desire to manage self-care  As Evidenced by: BMI >25, Excessive energy intake and Excessive fat / cholesterol intake     Nutrition Prescription: Recommend the following diet  Low fat, Low sugar, High protein and Regular    Interventions and Teaching   Discussed pre-op and post-op nutrition guidelines  Patient educated and handouts provided    Surgical changes to stomach / GI  Capacity of post-surgery stomach  Diet progression  Adequate hydration  Sugar and fat restriction to decrease "dumping syndrome"  Fat restriction to decrease steatorrhea  Expected weight loss  Weight loss plateaus/ possibility of weight regain  Exercise  Suggestions for pre-op diet  Nutrition considerations after surgery  Protein supplements  Meal planning and preparation  Appropriate carbohydrate, protein, and fat intake, and food/fluid choices to maximize safe weight loss, nutrient intake, and tolerance   Dietary and lifestyle changes  Possible problems with poor eating habits  Techniques for self monitoring and keeping daily food journal  Potential for food intolerance after surgery, and ways to deal with them including: lactose intolerance, nausea, reflux, vomiting, diarrhea, food intolerance, appetite changes, gas  Vitamin / Mineral supplementation of Multivitamin with minerals and Vitamin D  Pt reports he currently takes a men's OTC multivitamin  Education provided to: patient  Barriers to learning: Desire/Motivation:  Pt unsure at this time if he is more interested in surgical or nonsurgical weight loss  Readiness to change: contemplation  Prior research on procedure: internet and pre-op class  Comprehension: needs reinforcement and verbalizes understanding   Expected Compliance: good    Recommendations  Pt is an appropriate candidate for surgery  Yes  Minimum BMI of 98=376 48lbs  Pt is aware he cannot gain weight, cannot lose weight, and WILL NOT do two-week pre-op liquid diet  Pt had bloodwork on 5/17/2022:  CBC, CMP, Lipids, HgbA1c  Pt will need TSH  Pt will need updated CBC+CMP after 11/17/2022    (Pt very undecided right now about surgical vs non-surgical weight loss )    Evaluation / Monitoring  Dietitian to Monitor: Eating pattern as discussed Tolerance of nutrition prescription Body weight Lab values Physical activity Bowel pattern    Goals  Eliminate sugar sweetened beverages, Food journal, Exercise 30 minutes 5 times per week, Complete lession plans 1-6, Eat 3 meals per day and Eliminate mindless snacking    Time Spent:   1 Hour

## 2022-06-29 ENCOUNTER — TELEPHONE (OUTPATIENT)
Dept: BARIATRICS | Facility: CLINIC | Age: 47
End: 2022-06-29

## 2022-06-29 NOTE — TELEPHONE ENCOUNTER
----- Message from Eleni Nguyễn sent at 6/29/2022  9:20 AM EDT -----  Please let the patient know I have reviewed his TSH and it is within normal limits   ThanksSol

## 2022-08-16 ENCOUNTER — ANESTHESIA EVENT (EMERGENCY)
Dept: PERIOP | Facility: HOSPITAL | Age: 47
End: 2022-08-16
Payer: COMMERCIAL

## 2022-08-16 ENCOUNTER — APPOINTMENT (EMERGENCY)
Dept: CT IMAGING | Facility: HOSPITAL | Age: 47
End: 2022-08-16
Payer: COMMERCIAL

## 2022-08-16 ENCOUNTER — ANESTHESIA (EMERGENCY)
Dept: PERIOP | Facility: HOSPITAL | Age: 47
End: 2022-08-16
Payer: COMMERCIAL

## 2022-08-16 ENCOUNTER — HOSPITAL ENCOUNTER (OUTPATIENT)
Facility: HOSPITAL | Age: 47
Setting detail: OBSERVATION
Discharge: HOME | End: 2022-08-17
Attending: EMERGENCY MEDICINE | Admitting: SURGERY
Payer: COMMERCIAL

## 2022-08-16 DIAGNOSIS — R10.9 ABDOMINAL PAIN: ICD-10-CM

## 2022-08-16 DIAGNOSIS — E11.9 TYPE 2 DIABETES MELLITUS WITHOUT COMPLICATION, WITHOUT LONG-TERM CURRENT USE OF INSULIN (HCC): ICD-10-CM

## 2022-08-16 DIAGNOSIS — K56.609 BOWEL OBSTRUCTION (HCC): ICD-10-CM

## 2022-08-16 DIAGNOSIS — K45.8 INTERNAL HERNIA: Primary | ICD-10-CM

## 2022-08-16 LAB
ALBUMIN SERPL BCP-MCNC: 4.2 G/DL (ref 3.5–5)
ALP SERPL-CCNC: 119 U/L (ref 46–116)
ALT SERPL W P-5'-P-CCNC: 31 U/L (ref 12–78)
ANION GAP SERPL CALCULATED.3IONS-SCNC: 11 MMOL/L (ref 4–13)
AST SERPL W P-5'-P-CCNC: 21 U/L (ref 5–45)
ATRIAL RATE: 131 BPM
ATRIAL RATE: 288 BPM
BASOPHILS # BLD AUTO: 0.05 THOUSANDS/ΜL (ref 0–0.1)
BASOPHILS NFR BLD AUTO: 1 % (ref 0–1)
BILIRUB SERPL-MCNC: 0.51 MG/DL (ref 0.2–1)
BUN SERPL-MCNC: 14 MG/DL (ref 5–25)
CALCIUM SERPL-MCNC: 9.8 MG/DL (ref 8.3–10.1)
CARDIAC TROPONIN I PNL SERPL HS: <2 NG/L
CARDIAC TROPONIN I PNL SERPL HS: <2 NG/L
CHLORIDE SERPL-SCNC: 100 MMOL/L (ref 96–108)
CO2 SERPL-SCNC: 27 MMOL/L (ref 21–32)
CREAT SERPL-MCNC: 1.2 MG/DL (ref 0.6–1.3)
EOSINOPHIL # BLD AUTO: 0.14 THOUSAND/ΜL (ref 0–0.61)
EOSINOPHIL NFR BLD AUTO: 1 % (ref 0–6)
ERYTHROCYTE [DISTWIDTH] IN BLOOD BY AUTOMATED COUNT: 12.6 % (ref 11.6–15.1)
GFR SERPL CREATININE-BSD FRML MDRD: 72 ML/MIN/1.73SQ M
GLUCOSE SERPL-MCNC: 138 MG/DL (ref 65–140)
GLUCOSE SERPL-MCNC: 155 MG/DL (ref 65–140)
HCT VFR BLD AUTO: 43.1 % (ref 36.5–49.3)
HGB BLD-MCNC: 14.6 G/DL (ref 12–17)
IMM GRANULOCYTES # BLD AUTO: 0.06 THOUSAND/UL (ref 0–0.2)
IMM GRANULOCYTES NFR BLD AUTO: 1 % (ref 0–2)
LIPASE SERPL-CCNC: 136 U/L (ref 73–393)
LYMPHOCYTES # BLD AUTO: 1.62 THOUSANDS/ΜL (ref 0.6–4.47)
LYMPHOCYTES NFR BLD AUTO: 15 % (ref 14–44)
MCH RBC QN AUTO: 29.4 PG (ref 26.8–34.3)
MCHC RBC AUTO-ENTMCNC: 33.9 G/DL (ref 31.4–37.4)
MCV RBC AUTO: 87 FL (ref 82–98)
MONOCYTES # BLD AUTO: 0.56 THOUSAND/ΜL (ref 0.17–1.22)
MONOCYTES NFR BLD AUTO: 5 % (ref 4–12)
NEUTROPHILS # BLD AUTO: 8.49 THOUSANDS/ΜL (ref 1.85–7.62)
NEUTS SEG NFR BLD AUTO: 77 % (ref 43–75)
NRBC BLD AUTO-RTO: 0 /100 WBCS
PLATELET # BLD AUTO: 262 THOUSANDS/UL (ref 149–390)
PMV BLD AUTO: 9.6 FL (ref 8.9–12.7)
POTASSIUM SERPL-SCNC: 4.3 MMOL/L (ref 3.5–5.3)
PROT SERPL-MCNC: 8.8 G/DL (ref 6.4–8.4)
QRS AXIS: 34 DEGREES
QRS AXIS: 54 DEGREES
QRSD INTERVAL: 84 MS
QRSD INTERVAL: 86 MS
QT INTERVAL: 404 MS
QT INTERVAL: 410 MS
QTC INTERVAL: 410 MS
QTC INTERVAL: 410 MS
RBC # BLD AUTO: 4.97 MILLION/UL (ref 3.88–5.62)
SODIUM SERPL-SCNC: 138 MMOL/L (ref 135–147)
T WAVE AXIS: 43 DEGREES
T WAVE AXIS: 48 DEGREES
VENTRICULAR RATE: 60 BPM
VENTRICULAR RATE: 62 BPM
WBC # BLD AUTO: 10.92 THOUSAND/UL (ref 4.31–10.16)

## 2022-08-16 PROCEDURE — 84484 ASSAY OF TROPONIN QUANT: CPT | Performed by: GENERAL PRACTICE

## 2022-08-16 PROCEDURE — 96375 TX/PRO/DX INJ NEW DRUG ADDON: CPT

## 2022-08-16 PROCEDURE — 99285 EMERGENCY DEPT VISIT HI MDM: CPT

## 2022-08-16 PROCEDURE — 83690 ASSAY OF LIPASE: CPT | Performed by: GENERAL PRACTICE

## 2022-08-16 PROCEDURE — 99285 EMERGENCY DEPT VISIT HI MDM: CPT | Performed by: EMERGENCY MEDICINE

## 2022-08-16 PROCEDURE — 49652 PR LAP, VENTRAL HERNIA REPAIR,REDUCIBLE: CPT | Performed by: SURGERY

## 2022-08-16 PROCEDURE — 96361 HYDRATE IV INFUSION ADD-ON: CPT

## 2022-08-16 PROCEDURE — 93005 ELECTROCARDIOGRAM TRACING: CPT

## 2022-08-16 PROCEDURE — 99205 OFFICE O/P NEW HI 60 MIN: CPT | Performed by: SURGERY

## 2022-08-16 PROCEDURE — 74177 CT ABD & PELVIS W/CONTRAST: CPT

## 2022-08-16 PROCEDURE — 93010 ELECTROCARDIOGRAM REPORT: CPT | Performed by: INTERNAL MEDICINE

## 2022-08-16 PROCEDURE — 80053 COMPREHEN METABOLIC PANEL: CPT | Performed by: GENERAL PRACTICE

## 2022-08-16 PROCEDURE — G1004 CDSM NDSC: HCPCS

## 2022-08-16 PROCEDURE — 96374 THER/PROPH/DIAG INJ IV PUSH: CPT

## 2022-08-16 PROCEDURE — 96376 TX/PRO/DX INJ SAME DRUG ADON: CPT

## 2022-08-16 PROCEDURE — 82948 REAGENT STRIP/BLOOD GLUCOSE: CPT

## 2022-08-16 PROCEDURE — 85025 COMPLETE CBC W/AUTO DIFF WBC: CPT | Performed by: GENERAL PRACTICE

## 2022-08-16 PROCEDURE — 36415 COLL VENOUS BLD VENIPUNCTURE: CPT | Performed by: GENERAL PRACTICE

## 2022-08-16 PROCEDURE — 94660 CPAP INITIATION&MGMT: CPT

## 2022-08-16 RX ORDER — ONDANSETRON 2 MG/ML
INJECTION INTRAMUSCULAR; INTRAVENOUS AS NEEDED
Status: DISCONTINUED | OUTPATIENT
Start: 2022-08-16 | End: 2022-08-16

## 2022-08-16 RX ORDER — SUCCINYLCHOLINE/SOD CL,ISO/PF 100 MG/5ML
SYRINGE (ML) INTRAVENOUS AS NEEDED
Status: DISCONTINUED | OUTPATIENT
Start: 2022-08-16 | End: 2022-08-16

## 2022-08-16 RX ORDER — OXYCODONE HYDROCHLORIDE 5 MG/1
5 TABLET ORAL EVERY 4 HOURS PRN
Status: DISCONTINUED | OUTPATIENT
Start: 2022-08-16 | End: 2022-08-17 | Stop reason: HOSPADM

## 2022-08-16 RX ORDER — FENTANYL CITRATE 50 UG/ML
INJECTION, SOLUTION INTRAMUSCULAR; INTRAVENOUS AS NEEDED
Status: DISCONTINUED | OUTPATIENT
Start: 2022-08-16 | End: 2022-08-16

## 2022-08-16 RX ORDER — KETOROLAC TROMETHAMINE 30 MG/ML
15 INJECTION, SOLUTION INTRAMUSCULAR; INTRAVENOUS ONCE
Status: COMPLETED | OUTPATIENT
Start: 2022-08-16 | End: 2022-08-16

## 2022-08-16 RX ORDER — MAGNESIUM HYDROXIDE 1200 MG/15ML
LIQUID ORAL AS NEEDED
Status: DISCONTINUED | OUTPATIENT
Start: 2022-08-16 | End: 2022-08-16 | Stop reason: HOSPADM

## 2022-08-16 RX ORDER — ONDANSETRON 2 MG/ML
4 INJECTION INTRAMUSCULAR; INTRAVENOUS ONCE
Status: COMPLETED | OUTPATIENT
Start: 2022-08-16 | End: 2022-08-16

## 2022-08-16 RX ORDER — METRONIDAZOLE 500 MG/100ML
500 INJECTION, SOLUTION INTRAVENOUS
Status: DISCONTINUED | OUTPATIENT
Start: 2022-08-17 | End: 2022-08-17

## 2022-08-16 RX ORDER — CEFAZOLIN SODIUM 2 G/50ML
2000 SOLUTION INTRAVENOUS
Status: COMPLETED | OUTPATIENT
Start: 2022-08-17 | End: 2022-08-16

## 2022-08-16 RX ORDER — LIDOCAINE HYDROCHLORIDE 10 MG/ML
INJECTION, SOLUTION EPIDURAL; INFILTRATION; INTRACAUDAL; PERINEURAL AS NEEDED
Status: DISCONTINUED | OUTPATIENT
Start: 2022-08-16 | End: 2022-08-16

## 2022-08-16 RX ORDER — HYDROMORPHONE HCL/PF 1 MG/ML
0.5 SYRINGE (ML) INJECTION ONCE
Status: COMPLETED | OUTPATIENT
Start: 2022-08-16 | End: 2022-08-16

## 2022-08-16 RX ORDER — VENLAFAXINE HYDROCHLORIDE 37.5 MG/1
37.5 CAPSULE, EXTENDED RELEASE ORAL DAILY
Status: DISCONTINUED | OUTPATIENT
Start: 2022-08-17 | End: 2022-08-17 | Stop reason: HOSPADM

## 2022-08-16 RX ORDER — SODIUM CHLORIDE 9 MG/ML
INJECTION, SOLUTION INTRAVENOUS CONTINUOUS PRN
Status: DISCONTINUED | OUTPATIENT
Start: 2022-08-16 | End: 2022-08-16

## 2022-08-16 RX ORDER — HEPARIN SODIUM 5000 [USP'U]/ML
5000 INJECTION, SOLUTION INTRAVENOUS; SUBCUTANEOUS EVERY 8 HOURS SCHEDULED
Status: DISCONTINUED | OUTPATIENT
Start: 2022-08-16 | End: 2022-08-17 | Stop reason: HOSPADM

## 2022-08-16 RX ORDER — NEOSTIGMINE METHYLSULFATE 1 MG/ML
INJECTION INTRAVENOUS AS NEEDED
Status: DISCONTINUED | OUTPATIENT
Start: 2022-08-16 | End: 2022-08-16

## 2022-08-16 RX ORDER — GLYCOPYRROLATE 0.2 MG/ML
INJECTION INTRAMUSCULAR; INTRAVENOUS AS NEEDED
Status: DISCONTINUED | OUTPATIENT
Start: 2022-08-16 | End: 2022-08-16

## 2022-08-16 RX ORDER — LIDOCAINE HYDROCHLORIDE 20 MG/ML
1 JELLY TOPICAL ONCE
Status: COMPLETED | OUTPATIENT
Start: 2022-08-16 | End: 2022-08-16

## 2022-08-16 RX ORDER — HYDROMORPHONE HCL/PF 1 MG/ML
0.5 SYRINGE (ML) INJECTION ONCE
Status: DISCONTINUED | OUTPATIENT
Start: 2022-08-16 | End: 2022-08-16

## 2022-08-16 RX ORDER — ONDANSETRON 2 MG/ML
4 INJECTION INTRAMUSCULAR; INTRAVENOUS ONCE
Status: DISCONTINUED | OUTPATIENT
Start: 2022-08-16 | End: 2022-08-17 | Stop reason: HOSPADM

## 2022-08-16 RX ORDER — HYDROMORPHONE HCL/PF 1 MG/ML
0.5 SYRINGE (ML) INJECTION
Status: DISCONTINUED | OUTPATIENT
Start: 2022-08-16 | End: 2022-08-17 | Stop reason: HOSPADM

## 2022-08-16 RX ORDER — MIDAZOLAM HYDROCHLORIDE 2 MG/2ML
INJECTION, SOLUTION INTRAMUSCULAR; INTRAVENOUS AS NEEDED
Status: DISCONTINUED | OUTPATIENT
Start: 2022-08-16 | End: 2022-08-16

## 2022-08-16 RX ORDER — HYDROMORPHONE HCL/PF 1 MG/ML
0.5 SYRINGE (ML) INJECTION
Status: DISCONTINUED | OUTPATIENT
Start: 2022-08-16 | End: 2022-08-16 | Stop reason: HOSPADM

## 2022-08-16 RX ORDER — DEXAMETHASONE SODIUM PHOSPHATE 10 MG/ML
INJECTION, SOLUTION INTRAMUSCULAR; INTRAVENOUS AS NEEDED
Status: DISCONTINUED | OUTPATIENT
Start: 2022-08-16 | End: 2022-08-16

## 2022-08-16 RX ORDER — ACETAMINOPHEN 325 MG/1
650 TABLET ORAL EVERY 6 HOURS PRN
Status: DISCONTINUED | OUTPATIENT
Start: 2022-08-16 | End: 2022-08-17 | Stop reason: HOSPADM

## 2022-08-16 RX ORDER — ONDANSETRON 2 MG/ML
4 INJECTION INTRAMUSCULAR; INTRAVENOUS EVERY 6 HOURS PRN
Status: DISCONTINUED | OUTPATIENT
Start: 2022-08-16 | End: 2022-08-17 | Stop reason: HOSPADM

## 2022-08-16 RX ORDER — BUPIVACAINE HYDROCHLORIDE 2.5 MG/ML
INJECTION, SOLUTION EPIDURAL; INFILTRATION; INTRACAUDAL AS NEEDED
Status: DISCONTINUED | OUTPATIENT
Start: 2022-08-16 | End: 2022-08-16 | Stop reason: HOSPADM

## 2022-08-16 RX ORDER — ATROPINE SULFATE 0.4 MG/ML
AMPUL (ML) INJECTION AS NEEDED
Status: DISCONTINUED | OUTPATIENT
Start: 2022-08-16 | End: 2022-08-16

## 2022-08-16 RX ORDER — SODIUM CHLORIDE, SODIUM GLUCONATE, SODIUM ACETATE, POTASSIUM CHLORIDE, MAGNESIUM CHLORIDE, SODIUM PHOSPHATE, DIBASIC, AND POTASSIUM PHOSPHATE .53; .5; .37; .037; .03; .012; .00082 G/100ML; G/100ML; G/100ML; G/100ML; G/100ML; G/100ML; G/100ML
100 INJECTION, SOLUTION INTRAVENOUS CONTINUOUS
Status: DISCONTINUED | OUTPATIENT
Start: 2022-08-16 | End: 2022-08-17 | Stop reason: HOSPADM

## 2022-08-16 RX ORDER — MIDAZOLAM HYDROCHLORIDE 2 MG/2ML
1 INJECTION, SOLUTION INTRAMUSCULAR; INTRAVENOUS ONCE
Status: COMPLETED | OUTPATIENT
Start: 2022-08-16 | End: 2022-08-16

## 2022-08-16 RX ORDER — ATORVASTATIN CALCIUM 20 MG/1
20 TABLET, FILM COATED ORAL DAILY
Status: DISCONTINUED | OUTPATIENT
Start: 2022-08-17 | End: 2022-08-17 | Stop reason: HOSPADM

## 2022-08-16 RX ORDER — OXYCODONE HYDROCHLORIDE 10 MG/1
10 TABLET ORAL EVERY 4 HOURS PRN
Status: DISCONTINUED | OUTPATIENT
Start: 2022-08-16 | End: 2022-08-17 | Stop reason: HOSPADM

## 2022-08-16 RX ORDER — ONDANSETRON 2 MG/ML
4 INJECTION INTRAMUSCULAR; INTRAVENOUS ONCE AS NEEDED
Status: DISCONTINUED | OUTPATIENT
Start: 2022-08-16 | End: 2022-08-16 | Stop reason: HOSPADM

## 2022-08-16 RX ORDER — HYDROMORPHONE HCL IN WATER/PF 6 MG/30 ML
0.2 PATIENT CONTROLLED ANALGESIA SYRINGE INTRAVENOUS ONCE
Status: COMPLETED | OUTPATIENT
Start: 2022-08-16 | End: 2022-08-16

## 2022-08-16 RX ORDER — ONDANSETRON 2 MG/ML
1 INJECTION INTRAMUSCULAR; INTRAVENOUS ONCE
Status: DISCONTINUED | OUTPATIENT
Start: 2022-08-16 | End: 2022-08-16

## 2022-08-16 RX ORDER — FENTANYL CITRATE/PF 50 MCG/ML
25 SYRINGE (ML) INJECTION
Status: DISCONTINUED | OUTPATIENT
Start: 2022-08-16 | End: 2022-08-16 | Stop reason: HOSPADM

## 2022-08-16 RX ORDER — PROPOFOL 10 MG/ML
INJECTION, EMULSION INTRAVENOUS AS NEEDED
Status: DISCONTINUED | OUTPATIENT
Start: 2022-08-16 | End: 2022-08-16

## 2022-08-16 RX ORDER — ROCURONIUM BROMIDE 10 MG/ML
INJECTION, SOLUTION INTRAVENOUS AS NEEDED
Status: DISCONTINUED | OUTPATIENT
Start: 2022-08-16 | End: 2022-08-16

## 2022-08-16 RX ADMIN — MIDAZOLAM 2 MG: 1 INJECTION INTRAMUSCULAR; INTRAVENOUS at 18:15

## 2022-08-16 RX ADMIN — GLYCOPYRROLATE 0.2 MG: 0.2 INJECTION, SOLUTION INTRAMUSCULAR; INTRAVENOUS at 19:00

## 2022-08-16 RX ADMIN — NEOSTIGMINE METHYLSULFATE 3 MG: 1 INJECTION INTRAVENOUS at 18:55

## 2022-08-16 RX ADMIN — KETOROLAC TROMETHAMINE 15 MG: 30 INJECTION, SOLUTION INTRAMUSCULAR at 14:00

## 2022-08-16 RX ADMIN — SODIUM CHLORIDE: 0.9 INJECTION, SOLUTION INTRAVENOUS at 18:52

## 2022-08-16 RX ADMIN — CEFAZOLIN SODIUM 2000 MG: 2 SOLUTION INTRAVENOUS at 18:20

## 2022-08-16 RX ADMIN — ONDANSETRON 4 MG: 2 INJECTION INTRAMUSCULAR; INTRAVENOUS at 13:31

## 2022-08-16 RX ADMIN — TOPICAL ANESTHETIC 2 SPRAY: 200 SPRAY DENTAL; PERIODONTAL at 17:04

## 2022-08-16 RX ADMIN — Medication 100 MG: at 18:20

## 2022-08-16 RX ADMIN — SODIUM CHLORIDE: 0.9 INJECTION, SOLUTION INTRAVENOUS at 17:59

## 2022-08-16 RX ADMIN — IOHEXOL 63 ML: 350 INJECTION, SOLUTION INTRAVENOUS at 14:58

## 2022-08-16 RX ADMIN — FENTANYL CITRATE 50 MCG: 50 INJECTION INTRAMUSCULAR; INTRAVENOUS at 18:20

## 2022-08-16 RX ADMIN — SODIUM CHLORIDE, SODIUM GLUCONATE, SODIUM ACETATE, POTASSIUM CHLORIDE, MAGNESIUM CHLORIDE, SODIUM PHOSPHATE, DIBASIC, AND POTASSIUM PHOSPHATE 100 ML/HR: .53; .5; .37; .037; .03; .012; .00082 INJECTION, SOLUTION INTRAVENOUS at 21:17

## 2022-08-16 RX ADMIN — LIDOCAINE HYDROCHLORIDE 1 APPLICATION: 20 JELLY TOPICAL at 17:03

## 2022-08-16 RX ADMIN — DEXAMETHASONE SODIUM PHOSPHATE 4 MG: 10 INJECTION, SOLUTION INTRAMUSCULAR; INTRAVENOUS at 18:20

## 2022-08-16 RX ADMIN — MIDAZOLAM 1 MG: 1 INJECTION INTRAMUSCULAR; INTRAVENOUS at 17:03

## 2022-08-16 RX ADMIN — PROPOFOL 200 MG: 10 INJECTION, EMULSION INTRAVENOUS at 18:20

## 2022-08-16 RX ADMIN — LIDOCAINE HYDROCHLORIDE 100 MG: 10 INJECTION, SOLUTION EPIDURAL; INFILTRATION; INTRACAUDAL; PERINEURAL at 18:20

## 2022-08-16 RX ADMIN — FENTANYL CITRATE 50 MCG: 50 INJECTION INTRAMUSCULAR; INTRAVENOUS at 18:44

## 2022-08-16 RX ADMIN — HYDROMORPHONE HYDROCHLORIDE 0.2 MG: 0.2 INJECTION, SOLUTION INTRAMUSCULAR; INTRAVENOUS; SUBCUTANEOUS at 13:38

## 2022-08-16 RX ADMIN — FENTANYL CITRATE 50 MCG: 50 INJECTION INTRAMUSCULAR; INTRAVENOUS at 18:57

## 2022-08-16 RX ADMIN — HYDROMORPHONE HYDROCHLORIDE 0.5 MG: 1 INJECTION, SOLUTION INTRAMUSCULAR; INTRAVENOUS; SUBCUTANEOUS at 15:52

## 2022-08-16 RX ADMIN — ROCURONIUM BROMIDE 5 MG: 50 INJECTION, SOLUTION INTRAVENOUS at 18:20

## 2022-08-16 RX ADMIN — GLYCOPYRROLATE 0.4 MG: 0.2 INJECTION, SOLUTION INTRAMUSCULAR; INTRAVENOUS at 18:55

## 2022-08-16 RX ADMIN — Medication 0.4 MG: at 19:00

## 2022-08-16 RX ADMIN — HEPARIN SODIUM 5000 UNITS: 5000 INJECTION INTRAVENOUS; SUBCUTANEOUS at 21:29

## 2022-08-16 RX ADMIN — ROCURONIUM BROMIDE 35 MG: 50 INJECTION, SOLUTION INTRAVENOUS at 18:24

## 2022-08-16 RX ADMIN — SODIUM CHLORIDE 1000 ML: 0.9 INJECTION, SOLUTION INTRAVENOUS at 13:30

## 2022-08-16 RX ADMIN — HYDROMORPHONE HYDROCHLORIDE 0.5 MG: 1 INJECTION, SOLUTION INTRAMUSCULAR; INTRAVENOUS; SUBCUTANEOUS at 14:03

## 2022-08-16 RX ADMIN — ONDANSETRON 4 MG: 2 INJECTION INTRAMUSCULAR; INTRAVENOUS at 18:20

## 2022-08-16 NOTE — ANESTHESIA PREPROCEDURE EVALUATION
Procedure:  LAPAROSCOPY DIAGNOSTIC, POSSIBLE BOWEL RESECTION (N/A Abdomen)    Relevant Problems   CARDIO   (+) Hyperlipidemia      ENDO   (+) Type 2 diabetes mellitus without complication, without long-term current use of insulin (HCC)      NEURO/PSYCH   (+) Anxiety      PULMONARY   (+) Obstructive sleep apnea        Physical Exam    Airway    Mallampati score: III  TM Distance: >3 FB  Neck ROM: full     Dental       Cardiovascular  Rhythm: regular, Rate: normal,     Pulmonary  Breath sounds clear to auscultation,     Other Findings        Anesthesia Plan  ASA Score- 2 Emergent    Anesthesia Type- general with ASA Monitors  Additional Monitors:   Airway Plan:           Plan Factors-Exercise tolerance (METS): >4 METS  Chart reviewed  Existing labs reviewed  Patient summary reviewed  Patient is not a current smoker  Patient not instructed to abstain from smoking on day of procedure  Patient did not smoke on day of surgery  Obstructive sleep apnea risk education given perioperatively  Induction- intravenous  Postoperative Plan- Plan for postoperative opioid use  Informed Consent- Anesthetic plan and risks discussed with patient

## 2022-08-16 NOTE — ED NOTES
Pt NG tube placed by Dr Mary Carmen Brock   Pt tolerating NG tube at this time     Emily Woo RN  08/16/22 [id number]

## 2022-08-16 NOTE — Clinical Note
Case was discussed with JERONIMO and the patient's admission status was agreed to be Admission Status: inpatient status to the service of Dr MA [facility]

## 2022-08-16 NOTE — ED PROVIDER NOTES
History  Chief Complaint   Patient presents with    Abdominal Pain     Pt arrived via ems from home c/o sharp epigastric pain that started this morning around 4am with vomiting and nausea, denies diarrhea, unsure of fever but was sweating  Denies any previous abdominal surgeries  Was given 4mg of zofran by ems pta     Patient is a 40 yo M with PMH of diabetes and HLD presenting with 1 day history of severe epigastric abdominal pain with associated nausea and vomiting  He reports the pain started this morning around 4am  He does not note any recent illness, alcohol consumption, or changes to his diet  He does say that the pain sometimes radiates to his back, but it has been inconsistent  He denies fevers, headaches, diarrhea, constipation, CP, or SOB  Prior to Admission Medications   Prescriptions Last Dose Informant Patient Reported? Taking? Semaglutide,0 25 or 0 5MG/DOS, 2 MG/1 5ML SOPN   No No   Sig: Inject 0 25 mg under the skin every 7 days for 30 days, THEN 0 5 mg every 7 days  atorvastatin (LIPITOR) 20 mg tablet 8/15/2022 at Unknown time  Yes Yes   Sig: Take 20 mg by mouth daily   metFORMIN (GLUCOPHAGE) 1000 MG tablet 8/16/2022 at Unknown time  Yes Yes   Sig: Take 1,000 mg by mouth 2 (two) times a day   venlafaxine (EFFEXOR-XR) 37 5 mg 24 hr capsule 8/15/2022 at Unknown time  Yes Yes   Sig: Take 37 5 mg by mouth daily      Facility-Administered Medications: None       Past Medical History:   Diagnosis Date    Diabetes ([address] )     Hyperlipidemia     YURIY on CPAP        Past Surgical History:   Procedure Laterality Date    VASECTOMY         Family History   Family history unknown: Yes     I have reviewed and agree with the history as documented      E-Cigarette/Vaping    E-Cigarette Use Never User      E-Cigarette/Vaping Substances     Social History     Tobacco Use    Smoking status: Former Smoker     Packs/day: 3 00     Types: Cigarettes    Smokeless tobacco: Never Used    Tobacco comment: 8 YRS AGO   Vaping Use    Vaping Use: Never used   Substance Use Topics    Alcohol use: Never    Drug use: Never        Review of Systems   Constitutional: Positive for appetite change (anorexia), chills and fatigue  Negative for fever  HENT: Negative  Eyes: Negative  Respiratory: Negative for shortness of breath  Cardiovascular: Negative for chest pain and palpitations  Gastrointestinal: Positive for abdominal pain, nausea and vomiting  Negative for abdominal distention, constipation and diarrhea  Endocrine: Negative  Genitourinary: Negative for dysuria and flank pain  Musculoskeletal: Negative  Skin: Negative for rash  Allergic/Immunologic: Negative  Neurological: Negative for weakness  Psychiatric/Behavioral: Negative  Physical Exam  ED Triage Vitals   Temperature Pulse Respirations Blood Pressure SpO2   08/16/22 1308 08/16/22 1305 08/16/22 1305 08/16/22 1305 08/16/22 1305   97 5 °F (36 4 °C) 60 18 117/63 99 %      Temp Source Heart Rate Source Patient Position - Orthostatic VS BP Location FiO2 (%)   08/16/22 1308 08/16/22 1509 08/16/22 1509 08/16/22 1515 --   Oral Monitor Lying Right arm       Pain Score       08/16/22 1305       4             Orthostatic Vital Signs  Vitals:    08/16/22 1919 08/16/22 1934 08/16/22 1949 08/16/22 2032   BP: 137/70 127/68 129/70 108/56   Pulse: 92 78 82 73   Patient Position - Orthostatic VS:           Physical Exam  Constitutional:       Appearance: He is ill-appearing  HENT:      Head: Normocephalic  Mouth/Throat:      Pharynx: Oropharynx is clear  Eyes:      Extraocular Movements: Extraocular movements intact  Pupils: Pupils are equal, round, and reactive to light  Cardiovascular:      Rate and Rhythm: Normal rate and regular rhythm  Heart sounds: Normal heart sounds  No murmur heard  Pulmonary:      Effort: Pulmonary effort is normal       Breath sounds: Normal breath sounds  No wheezing     Abdominal:      General: Abdomen is protuberant  Bowel sounds are decreased  Palpations: Abdomen is soft  Tenderness: There is abdominal tenderness (severe TTP over epigastrum) in the epigastric area  Hernia: No hernia is present  Skin:     General: Skin is warm and dry  Coloration: Skin is not jaundiced  Neurological:      General: No focal deficit present  Mental Status: He is alert and oriented to person, place, and time     Psychiatric:         Mood and Affect: Mood normal          Behavior: Behavior normal          ED Medications  Medications   ondansetron (ZOFRAN) injection 4 mg ( Intravenous MAR Hold 8/16/22 1745)   heparin (porcine) subcutaneous injection 5,000 Units (5,000 Units Subcutaneous Given 8/17/22 8122)   multi-electrolyte (PLASMALYTE-A/ISOLYTE-S PH 7 4) IV solution (100 mL/hr Intravenous New Bag 8/16/22 2117)   ondansetron (ZOFRAN) injection 4 mg (has no administration in time range)   acetaminophen (TYLENOL) tablet 650 mg (has no administration in time range)   oxyCODONE (ROXICODONE) IR tablet 5 mg (has no administration in time range)   oxyCODONE (ROXICODONE) immediate release tablet 10 mg (has no administration in time range)   HYDROmorphone (DILAUDID) injection 0 5 mg (has no administration in time range)   atorvastatin (LIPITOR) tablet 20 mg (has no administration in time range)   venlafaxine (EFFEXOR-XR) 24 hr capsule 37 5 mg (has no administration in time range)   sodium chloride 0 9 % bolus 1,000 mL (0 mL Intravenous Stopped 8/16/22 1620)   ondansetron (ZOFRAN) injection 4 mg (4 mg Intravenous Given 8/16/22 1331)   HYDROmorphone HCl (DILAUDID) injection 0 2 mg (0 2 mg Intravenous Given 8/16/22 1338)   ketorolac (TORADOL) injection 15 mg (15 mg Intravenous Given 8/16/22 1400)   HYDROmorphone (DILAUDID) injection 0 5 mg (0 5 mg Intravenous Given 8/16/22 1403)   iohexol (OMNIPAQUE) 350 MG/ML injection (MULTI-DOSE) 63 mL (63 mL Intravenous Given 8/16/22 1458)   HYDROmorphone (DILAUDID) injection 0 5 mg (0 5 mg Intravenous Given 8/16/22 1552)   lidocaine (XYLOCAINE) 2 % topical gel 1 application (1 application Urethral Given by Other 8/16/22 1703)   midazolam (VERSED) injection 1 mg (1 mg Intravenous Given 8/16/22 1703)   benzocaine (HURRICAINE) 20 % mucosal spray 2 spray (2 sprays Mucosal Given by Other 8/16/22 1704)   ceFAZolin (ANCEF) IVPB (premix in dextrose) 2,000 mg 50 mL (2,000 mg Intravenous Not Given 8/17/22 0603)       Diagnostic Studies  Results Reviewed     Procedure Component Value Units Date/Time    HS Troponin I 2hr [[id number]] Collected: 08/16/22 1559    Lab Status: Final result Specimen: Blood from Line, Venous Updated: 08/16/22 1628     hs TnI 2hr <2 ng/L      Delta 2hr hsTnI --    HS Troponin I 4hr [[id number]]     Lab Status: No result Specimen: Blood     HS Troponin 0hr (reflex protocol) [[id number]]  (Normal) Collected: 08/16/22 1358    Lab Status: Final result Specimen: Blood from Arm, Left Updated: 08/16/22 1444     hs TnI 0hr <2 ng/L     Comprehensive metabolic panel [[id number]]  (Abnormal) Collected: 08/16/22 1358    Lab Status: Final result Specimen: Blood from Arm, Left Updated: 08/16/22 1435     Sodium 138 mmol/L      Potassium 4 3 mmol/L      Chloride 100 mmol/L      CO2 27 mmol/L      ANION GAP 11 mmol/L      BUN 14 mg/dL      Creatinine 1 20 mg/dL      Glucose 155 mg/dL      Calcium 9 8 mg/dL      AST 21 U/L      ALT 31 U/L      Alkaline Phosphatase 119 U/L      Total Protein 8 8 g/dL      Albumin 4 2 g/dL      Total Bilirubin 0 51 mg/dL      eGFR 72 ml/min/1 73sq m     Narrative:      [place] guidelines for Chronic Kidney Disease (CKD):     Stage 1 with normal or high GFR (GFR > 90 mL/min/1 73 square meters)    Stage 2 Mild CKD (GFR = 60-89 mL/min/1 73 square meters)    Stage 3A Moderate CKD (GFR = 45-59 mL/min/1 73 square meters)    Stage 3B Moderate CKD (GFR = 30-44 mL/min/1 73 square meters)    Stage 4 Severe CKD (GFR = 15-29 mL/min/1 73 square meters)    Stage 5 End Stage CKD (GFR <15 mL/min/1 73 square meters)  Note: GFR calculation is accurate only with a steady state creatinine    Lipase [[id number]]  (Normal) Collected: 08/16/22 1354    Lab Status: Final result Specimen: Blood from Arm, Left Updated: 08/16/22 1435     Lipase 136 u/L     CBC and differential [[id number]]  (Abnormal) Collected: 08/16/22 1358    Lab Status: Final result Specimen: Blood from Arm, Left Updated: 08/16/22 1404     WBC 10 92 Thousand/uL      RBC 4 97 Million/uL      Hemoglobin 14 6 g/dL      Hematocrit 43 1 %      MCV 87 fL      MCH 29 4 pg      MCHC 33 9 g/dL      RDW 12 6 %      MPV 9 6 fL      Platelets 123 Thousands/uL      nRBC 0 /100 WBCs      Neutrophils Relative 77 %      Immat GRANS % 1 %      Lymphocytes Relative 15 %      Monocytes Relative 5 %      Eosinophils Relative 1 %      Basophils Relative 1 %      Neutrophils Absolute 8 49 Thousands/µL      Immature Grans Absolute 0 06 Thousand/uL      Lymphocytes Absolute 1 62 Thousands/µL      Monocytes Absolute 0 56 Thousand/µL      Eosinophils Absolute 0 14 Thousand/µL      Basophils Absolute 0 05 Thousands/µL     UA (URINE) with reflex to Scope [[id number]]     Lab Status: No result Specimen: Urine                  CT abdomen pelvis with contrast   Final Result by Cody Govea DO (08/16 1626)   1  Multiple dilated loops of small bowel bowel within the mid-abdomen with air-fluid levels and adjacent transition points as described above  Findings are altogether favored represent a closed-loop small bowel obstruction with suspicion for underlying    internal hernia  The involved mesenteric vasculature is mildly engorged without evidence of pneumatosis intestinalis or portal venous gas  2   Punctate bilateral nonobstructing renal calculi        I personally discussed this study with Jenae Jones on 8/16/2022 at 4:13 PM                Workstation performed: [id number] Procedures  Procedures      ED Course  ED Course as of 08/17/22 0748   Tue Aug 16, 2022   1330 Patient examined  Labs, CTAP, pain meds, fluids ordered  [id number] Radiology call: likely closed loop small bowel obstruction  Surgery consulted  1630 Surgery at bedside  1645 NG placed, tolerated well  Impression: abdominal pain; suspect closed loop bowel obstruction  Plan: Labs, fluids, pain control, surgery consultation - likely OR  Dispo: Admit to surgery                      SBIRT 22yo+    Flowsheet Row Most Recent Value   SBIRT (25 yo +)    In order to provide better care to our patients, we are screening all of our patients for alcohol and drug use  Would it be okay to ask you these screening questions?  No Filed at: 08/16/2022 1405                MDM    Disposition  Final diagnoses:   Abdominal pain   [facility] ([facility] )     Time reflects when diagnosis was documented in both MDM as applicable and the Disposition within this note     Time User Action Codes Description Comment    8/16/2022  4:26 PM Leamon Duck Add [R10 9] Abdominal pain     8/16/2022  4:38 PM Kvng Epp Add [K45 8] Internal hernia     8/16/2022  5:01 PM Yolanda Hasting Add [K56 609] Bowel obstruction ([facility] )     8/17/2022  7:39 AM Devoria Levine Add [E11 9] Type 2 diabetes mellitus without complication, without long-term current use of insulin ([facility] )     8/17/2022  7:46 AM Devoria Levine Modify [R10 9] Abdominal pain     8/17/2022  7:46 AM Devoria Levine Modify [K45 8] Internal hernia     8/17/2022  7:46 AM Devoria Levine Modify [K56 609] Bowel obstruction ([facility] )     8/17/2022  7:46 AM Devoria Levine Modify [E11 9] Type 2 diabetes mellitus without complication, without long-term current use of insulin [facility])       ED Disposition     ED Disposition   Admit    Condition   Stable    Date/Time   Tue Aug 16, 2022  5:22 PM    Comment   Case was discussed with JERONIMO and the patient's admission status was agreed to be Admission Status: inpatient status to the service of Dr Yfn Mckeon  Follow-up Information     Follow up With Specialties Details Why Contact Info    Zoltan Johnson MD General Surgery, Wound Care Follow up in 2 week(s)  [address] [place] [address]      [facility] Internal Medicine Follow up Follow up to review the events of this hospitalization [address]  [address] [facility]  [phone]            Current Discharge Medication List      START taking these medications    Details   acetaminophen (TYLENOL) 325 mg tablet Take 2 tablets (650 mg total) by mouth every 6 (six) hours as needed for mild pain  Refills: 0    Associated Diagnoses: Internal hernia      oxyCODONE (ROXICODONE) 5 immediate release tablet Take 1 tablet (5 mg total) by mouth every 4 (four) hours as needed for moderate pain for up to 10 days Max Daily Amount: 30 mg  Qty: 10 tablet, Refills: 0    Comments: Ongoing therapy  Associated Diagnoses: Internal hernia         CONTINUE these medications which have CHANGED    Details   Semaglutide,0 25 or 0 5MG/DOS, 2 MG/1 5ML SOPN Inject 0 25 mg under the skin every 7 days for 30 days, THEN 0 5 mg every 7 days  Qty: 4 5 mL, Refills: 0    Associated Diagnoses: Type 2 diabetes mellitus without complication, without long-term current use of insulin (HCC)         CONTINUE these medications which have NOT CHANGED    Details   atorvastatin (LIPITOR) 20 mg tablet Take 20 mg by mouth daily      metFORMIN (GLUCOPHAGE) 1000 MG tablet Take 1,000 mg by mouth 2 (two) times a day      venlafaxine (EFFEXOR-XR) 37 5 mg 24 hr capsule Take 37 5 mg by mouth daily           Outpatient Discharge Orders   Discharge Diet       PDMP Review       Value Time User    PDMP Reviewed  Yes 8/17/2022  7:41 AM Eh Wick PA-C           ED Provider  Attending physically available and evaluated Ale Ortiz I managed the patient along with the ED Attending      Electronically Signed by  MD Joanna Bertrand MD  08/17/22 6759

## 2022-08-16 NOTE — OP NOTE
OPERATIVE REPORT  PATIENT NAME: Lawrence Fontaine    :  1975  MRN: [id number]  Pt Location: [facility] OR ROOM 08    SURGERY DATE: 2022    Surgeon(s) and Role:     * Delta Goldsmith MD - Primary     * Viry Griffiths MD - Assisting    Preop Diagnosis:  Internal hernia [K45 8]    Post-Op Diagnosis Codes:     * Internal hernia [K45 8]    Procedure(s) (LRB):  LAPAROSCOPY DIAGNOSTIC, LYSIS OF ADHESIONS, REDUCTION OF INTERNAL HERNIA (N/A)    Specimen(s):  * No specimens in log *    Estimated Blood Loss:   Minimal    Drains:  * No LDAs found *    Anesthesia Type:   General    Operative Indications:  Internal hernia [K45 8]      Operative Findings:  Internal hernia formed by a adhesive band from the omentum around the mid small bowel and mesentery  No signs of bowel ischemia    Complications:   None    Procedure and Technique:  The patient was seen again in the Holding Room  The risks, benefits, complications, treatment options, and expected outcomes were discussed with the patient  The patient and/or family concurred with the proposed plan, giving informed consent  The site of surgery properly noted/marked  The patient was taken to Operating Room, identified as Lawrence Fontaine  and the procedure verified  A Time Out was held after prepping and draping in sterile fashion  The above information was confirmed  Prior to the induction of general anesthesia, antibiotic prophylaxis was administered  General endotracheal anesthesia was then administered and tolerated well  After the induction, the abdomen was prepped in the usual sterile fashion  A small 5 mm supra umbilical incision was made with a 15 blade scalpel after infusion of local anesthetic  [place] is a used to grasp the fascia was opened and a 5 trocars placed in  The abdomen is inflated  Two other trocars were placed on the patient's left side  On initial inspection there was a dilated loop of intestine the midline consistent with diagnosis    Was the omentum was grasped and elevated to bring it above the stomach however it was adhered to the bowel so the omentum was followed down and a internal hernia was identified formed by a loop of omentum adherent to the mesentery causing the closed-loop bowel obstruction  This adhesion was lysed freeing up the adhesion  Next the small bowel was run from the terminal ileum back ligament Treitz there was no other obstructive points  There is no signs of ischemia  Then the 5 mm across closed with a figure-of-eight 0 Vicryl suture in the midline  Skin was closed with 4-0 Monocryl and glue       I was present for the entire procedure    Patient Disposition:  PACU       SIGNATURE: Jacinda Kern MD  DATE: August 16, 2022  TIME: 7:06 PM

## 2022-08-16 NOTE — ANESTHESIA POSTPROCEDURE EVALUATION
Post-Op Assessment Note    CV Status:  Stable  Pain Score: 1    Pain management: adequate     Mental Status:  Alert and awake   Hydration Status:  Euvolemic   PONV Controlled:  Controlled   Airway Patency:  Patent      Post Op Vitals Reviewed: Yes      Staff: Anesthesiologist         No complications documented      /70 (08/16/22 1919)    Temp 97 6 °F (36 4 °C) (08/16/22 1919)    Pulse 92 (08/16/22 1919)   Resp 14 (08/16/22 1919)    SpO2 93 % (08/16/22 1919)

## 2022-08-16 NOTE — PROGRESS NOTES
Progress Note - General Surgery   Hilary Alfaro 41 y o  male MRN: [id number]  Unit/Bed#: E5 -01 Encounter: [id number]    Assessment:  42 yo M with PMH of DM, YURIY on CPAP and HLD but no previous abdominal surgeries who presents with closed loop SBO 2/2 omental adhesion causing internal hernia, now s/p diagnostic laparoscopy with lysis of adhesions on 8/16     Vitals normal on room air      WBC 11 17 (from 10 92)  Hb 13 1 (from 14 6)  Cr 0 91 (from 1 20)    Plan:  Advance to soft diet  D/C IVF  PRN analgesia  DVT PPX  OOB/ambulate  Anticipate discharge in the next 24-48 hours    Subjective/Objective     Subjective: No acute events overnight, tolerating clear liquids without nausea or vomiting, denies flatus or BM yet, pain controlled    Objective:    Blood pressure 108/56, pulse 73, temperature 98 2 °F (36 8 °C), temperature source Oral, resp  rate 18, weight 113 kg (249 lb 12 5 oz), SpO2 98 %  ,Body mass index is 36 36 kg/m²  Intake/Output Summary (Last 24 hours) at 8/17/2022 0610  Last data filed at 8/16/2022 1917  Gross per 24 hour   Intake 1600 ml   Output --   Net 1600 ml       Invasive Devices  Report    Peripheral Intravenous Line  Duration           Peripheral IV 08/16/22 Dorsal (posterior); Left Hand 1 day    Peripheral IV 08/16/22 Left Antecubital <1 day                Physical Exam:  Gen:    NAD  CV:      warm, well-perfused  Lungs: No respiratory distress  Abd:     soft, appropriately Tender/ND, incisions c/d/i  Ext:      no CCE  Neuro: A&Ox3

## 2022-08-16 NOTE — H&P
H&P Exam - General Surgery   Lawrence Fontaine 41 y o  male MRN: [id number]  Unit/Bed#: OR POOL Encounter: [id number]    Assessment/Plan     Assessment:  40 yo M with PMH of DM, YURIY on CPAP and HLD but no previous abdominal surgeries who presents with SBO with suggestion of closed loop on imaging    Vitals normal on room air    WBC 10 92  Hb 14 6  Cr 1 20    Plan:  Plan for OR for diagnostic laparoscopy, possible exploratory laparotomy  NPO/NGT  [email or web address]  PRN analgesia  PRN antiemetics  DVT PPX    History of Present Illness     HPI:  Lawrence Fontaine is a 41 y o  male who presents with abdominal pain, nausea, and vomiting  Patient reports that the pain started this morning at 4:00 a m  Suddenly and woke him from sleep  His located in the epigastric area  He also endorses associated nausea and 2 episodes of vomiting with small streaks of blood the 2nd time  His last bowel movement was 2 or 3 days ago and since then he has not really been passing gas  He denies fevers at home  He denies history of previous abdominal surgery  He has had a colonoscopy in the past which was positive for polyps which were treated endoscopically  He has a history of diabetes, hyperlipidemia and YURIY on CPAP  He denies history of anti-platelet or anticoagulant use  Review of Systems   Constitutional: Negative  HENT: Negative  Eyes: Negative  Respiratory: Negative  Cardiovascular: Negative  Gastrointestinal: Positive for abdominal pain (Epigastric), constipation (Last bowel movement 2-3 days ago), nausea and vomiting (x2)  Genitourinary: Negative  Musculoskeletal: Negative  Skin: Negative  Neurological: Negative  Psychiatric/Behavioral: Negative             Historical Information   Past Medical History:   Diagnosis Date    Diabetes ([facility] )     Hyperlipidemia     YURIY on CPAP      Past Surgical History:   Procedure Laterality Date    VASECTOMY       Social History   Social History     Substance and Sexual Activity   Alcohol Use Never     Social History     Substance and Sexual Activity   Drug Use Never     Social History     Tobacco Use   Smoking Status Former Smoker    Packs/day: 3 00    Types: Cigarettes   Smokeless Tobacco Never Used   Tobacco Comment    8 YRS AGO     E-Cigarette/Vaping    E-Cigarette Use Never User      E-Cigarette/Vaping Substances     Family History:   Family History   Family history unknown: Yes       Meds/Allergies   PTA meds:   Prior to Admission Medications   Prescriptions Last Dose Informant Patient Reported? Taking? Semaglutide,0 25 or 0 5MG/DOS, 2 MG/1 5ML SOPN   No No   Sig: Inject 0 25 mg under the skin every 7 days for 30 days, THEN 0 5 mg every 7 days  atorvastatin (LIPITOR) 20 mg tablet 8/15/2022 at Unknown time  Yes Yes   Sig: Take 20 mg by mouth daily   metFORMIN (GLUCOPHAGE) 1000 MG tablet 8/16/2022 at Unknown time  Yes Yes   Sig: Take 1,000 mg by mouth 2 (two) times a day   venlafaxine (EFFEXOR-XR) 37 5 mg 24 hr capsule 8/15/2022 at Unknown time  Yes Yes   Sig: Take 37 5 mg by mouth daily      Facility-Administered Medications: None     No Known Allergies    Objective   First Vitals:   Blood Pressure: 117/63 (08/16/22 1305)  Pulse: 60 (08/16/22 1305)  Temperature: 97 5 °F (36 4 °C) (08/16/22 1308)  Temp Source: Oral (08/16/22 1308)  Respirations: 18 (08/16/22 1305)  Weight - Scale: 113 kg (249 lb 12 5 oz) (08/16/22 1305)  SpO2: 99 % (08/16/22 1305)    Current Vitals:   Blood Pressure: 114/63 (08/16/22 1515)  Pulse: 62 (08/16/22 1715)  Temperature: 97 5 °F (36 4 °C) (08/16/22 1308)  Temp Source: Oral (08/16/22 1308)  Respirations: 14 (08/16/22 1715)  Weight - Scale: 113 kg (249 lb 12 5 oz) (08/16/22 1305)  SpO2: 97 % (08/16/22 1715)    No intake or output data in the 24 hours ending 08/16/22 1745    Invasive Devices  Report    Peripheral Intravenous Line  Duration           Peripheral IV 08/16/22 Dorsal (posterior); Left Hand <1 day    Peripheral IV 08/16/22 Left Antecubital <1 day                Physical Exam  Constitutional:       Appearance: Normal appearance  HENT:      Head: Normocephalic and atraumatic  Right Ear: External ear normal       Left Ear: External ear normal       Nose: Nose normal       Mouth/Throat:      Mouth: Mucous membranes are moist       Pharynx: Oropharynx is clear  Eyes:      Extraocular Movements: Extraocular movements intact  Conjunctiva/sclera: Conjunctivae normal       Pupils: Pupils are equal, round, and reactive to light  Cardiovascular:      Rate and Rhythm: Normal rate and regular rhythm  Pulses: Normal pulses  Pulmonary:      Effort: Pulmonary effort is normal    Abdominal:      General: Abdomen is flat  There is distension (Mildly distended)  Palpations: Abdomen is soft  Tenderness: There is abdominal tenderness (Tender in epigastric area)  There is no guarding or rebound  Musculoskeletal:         General: Normal range of motion  Cervical back: Normal range of motion  Skin:     General: Skin is warm and dry  Neurological:      General: No focal deficit present  Mental Status: He is alert and oriented to person, place, and time  Psychiatric:         Mood and Affect: Mood normal          Behavior: Behavior normal            Lab Results: I have personally reviewed pertinent lab results  Imaging: I have personally reviewed pertinent reports  EKG, Pathology, and Other Studies: I have personally reviewed pertinent reports        Code Status: No Order  Advance Directive and Living Will:      Power of :    POLST:

## 2022-08-17 VITALS
DIASTOLIC BLOOD PRESSURE: 58 MMHG | BODY MASS INDEX: 36.36 KG/M2 | WEIGHT: 249.78 LBS | OXYGEN SATURATION: 94 % | TEMPERATURE: 98.2 F | SYSTOLIC BLOOD PRESSURE: 119 MMHG | RESPIRATION RATE: 16 BRPM | HEART RATE: 77 BPM

## 2022-08-17 LAB
ANION GAP SERPL CALCULATED.3IONS-SCNC: 10 MMOL/L (ref 4–13)
BASOPHILS # BLD AUTO: 0.02 THOUSANDS/ΜL (ref 0–0.1)
BASOPHILS NFR BLD AUTO: 0 % (ref 0–1)
BUN SERPL-MCNC: 12 MG/DL (ref 5–25)
CALCIUM SERPL-MCNC: 8.9 MG/DL (ref 8.3–10.1)
CHLORIDE SERPL-SCNC: 103 MMOL/L (ref 96–108)
CO2 SERPL-SCNC: 26 MMOL/L (ref 21–32)
CREAT SERPL-MCNC: 0.91 MG/DL (ref 0.6–1.3)
EOSINOPHIL # BLD AUTO: 0.02 THOUSAND/ΜL (ref 0–0.61)
EOSINOPHIL NFR BLD AUTO: 0 % (ref 0–6)
ERYTHROCYTE [DISTWIDTH] IN BLOOD BY AUTOMATED COUNT: 12.7 % (ref 11.6–15.1)
GFR SERPL CREATININE-BSD FRML MDRD: 101 ML/MIN/1.73SQ M
GLUCOSE P FAST SERPL-MCNC: 136 MG/DL (ref 65–99)
GLUCOSE SERPL-MCNC: 136 MG/DL (ref 65–140)
HCT VFR BLD AUTO: 39.1 % (ref 36.5–49.3)
HGB BLD-MCNC: 13.1 G/DL (ref 12–17)
IMM GRANULOCYTES # BLD AUTO: 0.07 THOUSAND/UL (ref 0–0.2)
IMM GRANULOCYTES NFR BLD AUTO: 1 % (ref 0–2)
LYMPHOCYTES # BLD AUTO: 1.06 THOUSANDS/ΜL (ref 0.6–4.47)
LYMPHOCYTES NFR BLD AUTO: 10 % (ref 14–44)
MCH RBC QN AUTO: 28.8 PG (ref 26.8–34.3)
MCHC RBC AUTO-ENTMCNC: 33.5 G/DL (ref 31.4–37.4)
MCV RBC AUTO: 86 FL (ref 82–98)
MONOCYTES # BLD AUTO: 0.69 THOUSAND/ΜL (ref 0.17–1.22)
MONOCYTES NFR BLD AUTO: 6 % (ref 4–12)
NEUTROPHILS # BLD AUTO: 9.31 THOUSANDS/ΜL (ref 1.85–7.62)
NEUTS SEG NFR BLD AUTO: 83 % (ref 43–75)
NRBC BLD AUTO-RTO: 0 /100 WBCS
PLATELET # BLD AUTO: 285 THOUSANDS/UL (ref 149–390)
PMV BLD AUTO: 9.6 FL (ref 8.9–12.7)
POTASSIUM SERPL-SCNC: 4.3 MMOL/L (ref 3.5–5.3)
RBC # BLD AUTO: 4.55 MILLION/UL (ref 3.88–5.62)
SODIUM SERPL-SCNC: 139 MMOL/L (ref 135–147)
WBC # BLD AUTO: 11.17 THOUSAND/UL (ref 4.31–10.16)

## 2022-08-17 PROCEDURE — 85025 COMPLETE CBC W/AUTO DIFF WBC: CPT | Performed by: SURGERY

## 2022-08-17 PROCEDURE — 80048 BASIC METABOLIC PNL TOTAL CA: CPT | Performed by: SURGERY

## 2022-08-17 PROCEDURE — NC001 PR NO CHARGE: Performed by: SURGERY

## 2022-08-17 RX ORDER — ACETAMINOPHEN 325 MG/1
650 TABLET ORAL EVERY 6 HOURS PRN
Refills: 0
Start: 2022-08-17

## 2022-08-17 RX ORDER — OXYCODONE HYDROCHLORIDE 5 MG/1
5 TABLET ORAL EVERY 4 HOURS PRN
Qty: 10 TABLET | Refills: 0 | Status: SHIPPED | OUTPATIENT
Start: 2022-08-17 | End: 2022-08-27

## 2022-08-17 RX ADMIN — VENLAFAXINE HYDROCHLORIDE 37.5 MG: 37.5 CAPSULE, EXTENDED RELEASE ORAL at 08:59

## 2022-08-17 RX ADMIN — HEPARIN SODIUM 5000 UNITS: 5000 INJECTION INTRAVENOUS; SUBCUTANEOUS at 05:33

## 2022-08-17 RX ADMIN — ATORVASTATIN CALCIUM 20 MG: 20 TABLET, FILM COATED ORAL at 08:59

## 2022-08-17 NOTE — DISCHARGE INSTRUCTIONS
[facility]  Post-Operative Care Instructions  Dr Geneva Berman MD, Rolan Driscoll  [phone]    1  General: You will feel pulling sensations around the wound or funny aches and pains around the incisions  This is normal  Even minor surgery is a change in your body and this is your bodys way of reaction to it  If you have had abdominal surgery, it may help to support the incision with a small pillow or blanket for comfort when moving or coughing  2  Wound care:  Okay to shower  The glue will fall off over the next week or 2     3  Water: You may shower over the wound, unless there are drain tubes left in place  Do not bathe or use a pool or hot tub until cleared by the physician  4  Activity: You may go up and down stairs, walk as much as you are comfortable, but walk at least 3 times each day  If you have had abdominal surgery, do not lift anything heavier than 15 pounds for at least 2-4 weeks, unless cleared by the doctor  5  Diet: You may resume a regular diet  If you had a same-day surgery or overnight stay surgery, he may wish to eat lightly for a few days: soups, crackers, and sandwiches  You may resume a regular diet when ready  6  Medications: Resume all of your previous medications, unless told otherwise by the doctor  Avoid aspirin or ibuprofen (Advil, Motrin, etc ) products for 2-3 days after the date of surgery  You may, at that time, began to take them again  Tylenol is always fine, unless you are taking any narcotic pain medication containing Tylenol (such as Percocet, Darvocet, Vicodin, or anything containing acetaminophen)  Do not take Tylenol if you're taking these medications  You do not need to take the narcotic pain medications unless you are having significant pain and discomfort  7  Driving: You will need someone to drive you home on the day of surgery   Do not drive or make any important decisions while on narcotic pain medication or 24 hours and after anesthesia or sedation for surgery  Generally, you may drive when your off all narcotic pain medications  8  Upset Stomach: You may take Maalox, Tums, or similar items for an upset stomach  If your narcotic pain medication causes an upset stomach, do not take it on an empty stomach  Try taking it with at least some crackers or toast      9  Constipation: Patients often experienced constipation after surgery  You may take over-the-counter medication for this, such as Metamucil, Senokot, Dulcolax, milk of magnesia, etc  You may take a suppository unless you have had anorectal surgery such as a procedure on your hemorrhoids  If you experience significant nausea or vomiting after abdominal surgery, call the office before trying any of these medications  10  Call the office: If you are experiencing any of the following, fevers above 101 5°, significant nausea or vomiting, if the wound develops drainage and/or is excessive redness around the wound, or if you have significant diarrhea or other worsening symptoms  11  Pain: You may be given a prescription for pain  This will be given to the hospital, the day of surgery  12  Sexual Activity: You may resume sexual activity when you feel ready and comfortable and your incision is sealed and healed without apparent infection risk  13  Urination: If you haven't urinated in 6 hours, go directly to the ER for evaluation for urinary retention  14  Follow-up in 2 weeks

## 2022-08-17 NOTE — QUICK NOTE
Post-Op Check    Assessment: 41 y o  M s/p diagnostic laparoscopy, lysis of adhesions    Plan:  Clear liquid diet  [email or web address]  PRN analgesia  DVT PPX    Subjective:  No acute events since procedure, has not yet taken PO but denies nausea or vomiting, denies flatus or BM yet, pain controlled    Vitals:    08/16/22 2032   BP: 108/56   Pulse: 73   Resp: 18   Temp: 98 2 °F (36 8 °C)   SpO2: 98%       PE:  Gen: NAD, AAOx3  CV: RRR  Pulm: no resp distress  Abd: Soft, non-distended, appropriately tender, incisions c/d/i    Parish Flores MD  General Surgery, PGY3

## 2022-08-17 NOTE — QUICK NOTE
Called patient to discuss discharge diet, encouraged ambulation and asked him to seek medical attention should he develop distention, nausea and vomiting  He is passing flatus this am   All questions answered

## 2022-08-17 NOTE — PLAN OF CARE
Problem: PAIN - ADULT  Goal: Verbalizes/displays adequate comfort level or baseline comfort level  Description: Interventions:  - Encourage patient to monitor pain and request assistance  - Assess pain using appropriate pain scale  - Administer analgesics based on type and severity of pain and evaluate response  - Implement non-pharmacological measures as appropriate and evaluate response  - Consider cultural and social influences on pain and pain management  - Notify physician/advanced practitioner if interventions unsuccessful or patient reports new pain  Outcome: Progressing     Problem: INFECTION - ADULT  Goal: Absence or prevention of progression during hospitalization  Description: INTERVENTIONS:  - Assess and monitor for signs and symptoms of infection  - Monitor lab/diagnostic results  - Monitor all insertion sites, i e  indwelling lines, tubes, and drains  - Monitor endotracheal if appropriate and nasal secretions for changes in amount and color  - [place] appropriate cooling/warming therapies per order  - Administer medications as ordered  - Instruct and encourage patient and family to use good hand hygiene technique  - Identify and instruct in appropriate isolation precautions for identified infection/condition  Outcome: Progressing  Goal: Absence of fever/infection during neutropenic period  Description: INTERVENTIONS:  - Monitor WBC    Outcome: Progressing     Problem: SAFETY ADULT  Goal: Patient will remain free of falls  Description: INTERVENTIONS:  - Educate patient/family on patient safety including physical limitations  - Instruct patient to call for assistance with activity   - Consult OT/PT to assist with strengthening/mobility   - Keep Call bell within reach  - Keep bed low and locked with side rails adjusted as appropriate  - Keep care items and personal belongings within reach  - Initiate and maintain comfort rounds  - Make Fall Risk Sign visible to staff  - Offer Toileting every  Hours, in advance of need  - Initiate/Maintain alarm  - Obtain necessary fall risk management equipment:   - Apply yellow socks and bracelet for high fall risk patients  - Consider moving patient to room near nurses station  Outcome: Progressing  Goal: Maintain or return to baseline ADL function  Description: INTERVENTIONS:  -  Assess patient's ability to carry out ADLs; assess patient's baseline for ADL function and identify physical deficits which impact ability to perform ADLs (bathing, care of mouth/teeth, toileting, grooming, dressing, etc )  - Assess/evaluate cause of self-care deficits   - Assess range of motion  - Assess patient's mobility; develop plan if impaired  - Assess patient's need for assistive devices and provide as appropriate  - Encourage maximum independence but intervene and supervise when necessary  - Involve family in performance of ADLs  - Assess for home care needs following discharge   - Consider OT consult to assist with ADL evaluation and planning for discharge  - Provide patient education as appropriate  Outcome: Progressing  Goal: Maintains/Returns to pre admission functional level  Description: INTERVENTIONS:  - Perform BMAT or MOVE assessment daily    - Set and communicate daily mobility goal to care team and patient/family/caregiver  - Collaborate with rehabilitation services on mobility goals if consulted  - Perform Range of Motion  times a day  - Reposition patient every  hours    - Dangle patient  times a day  - Stand patient  times a day  - Ambulate patient  times a day  - Out of bed to chair  times a day   - Out of bed for meals  times a day  - Out of bed for toileting  - Record patient progress and toleration of activity level   Outcome: Progressing     Problem: DISCHARGE PLANNING  Goal: Discharge to home or other facility with appropriate resources  Description: INTERVENTIONS:  - Identify barriers to discharge w/patient and caregiver  - Arrange for needed discharge resources and transportation as appropriate  - Identify discharge learning needs (meds, wound care, etc )  - Arrange for interpretive services to assist at discharge as needed  - Refer to Case Management Department for coordinating discharge planning if the patient needs post-hospital services based on physician/advanced practitioner order or complex needs related to functional status, cognitive ability, or social support system  Outcome: Progressing     Problem: Knowledge Deficit  Goal: Patient/family/caregiver demonstrates understanding of disease process, treatment plan, medications, and discharge instructions  Description: Complete learning assessment and assess knowledge base    Interventions:  - Provide teaching at level of understanding  - Provide teaching via preferred learning methods  Outcome: Progressing

## 2022-08-18 ENCOUNTER — TRANSITIONAL CARE MANAGEMENT (OUTPATIENT)
Dept: INTERNAL MEDICINE CLINIC | Facility: CLINIC | Age: 47
End: 2022-08-18

## 2022-08-30 ENCOUNTER — OFFICE VISIT (OUTPATIENT)
Dept: BARIATRICS | Facility: CLINIC | Age: 47
End: 2022-08-30
Payer: COMMERCIAL

## 2022-08-30 VITALS
OXYGEN SATURATION: 96 % | HEART RATE: 78 BPM | BODY MASS INDEX: 34.67 KG/M2 | WEIGHT: 242.2 LBS | DIASTOLIC BLOOD PRESSURE: 70 MMHG | SYSTOLIC BLOOD PRESSURE: 115 MMHG | HEIGHT: 70 IN | RESPIRATION RATE: 12 BRPM

## 2022-08-30 DIAGNOSIS — E11.9 TYPE 2 DIABETES MELLITUS WITHOUT COMPLICATION, WITHOUT LONG-TERM CURRENT USE OF INSULIN (HCC): ICD-10-CM

## 2022-08-30 DIAGNOSIS — E78.5 HYPERLIPIDEMIA, UNSPECIFIED HYPERLIPIDEMIA TYPE: ICD-10-CM

## 2022-08-30 DIAGNOSIS — F41.9 ANXIETY: ICD-10-CM

## 2022-08-30 DIAGNOSIS — G47.33 OBSTRUCTIVE SLEEP APNEA: ICD-10-CM

## 2022-08-30 DIAGNOSIS — E66.9 OBESITY, CLASS II, BMI 35-39.9: Primary | ICD-10-CM

## 2022-08-30 PROCEDURE — 99214 OFFICE O/P EST MOD 30 MIN: CPT | Performed by: NURSE PRACTITIONER

## 2022-08-30 PROCEDURE — 1111F DSCHRG MED/CURRENT MED MERGE: CPT | Performed by: NURSE PRACTITIONER

## 2022-08-30 NOTE — ASSESSMENT & PLAN NOTE
- Taking metformin and Ozempic  May improve with weight loss and lifestyle modification  Continue management with prescribing provider       Lab Results   Component Value Date    HGBA1C 5 6 05/17/2022

## 2022-08-30 NOTE — ASSESSMENT & PLAN NOTE
- Patient is pursuing Conservative Program  - Initial weight loss goal of 5-10% weight loss for improved health  - Labs reviewed: TSH 6/28/2022 was within normal limits  - Already on metformin   - Patient denies personal history of pancreatitis  Patient also denies personal and family history of medullary thyroid cancer and multiple endocrine neoplasia type 2 (MEN 2 tumor)  - Recovering well from hernia repair on 8/16/2022   - Restarted Ozempic 0 25 mg weekly on 8/23/2022  Tolerating well, hence will increase to 0 5 mg weekly after 4 weeks of 0 25 mg dose  He will monitor for signs of hypoglycemia  Initial: 245 4 lbs  Current: 242 2 lbs  Change: -3 2 lbs  Goal: 190 lbs    Goals:  Do not skip meals  Food log (ie ) www myfitnesspal com,sparkpeople  com,loseit com,calorieking  com,etc  baritastic (use skinnytaste  com, dietdoctor  com or smartphone jose e [employer] for recipes)  Try to food log consistently  5041-8339 calories per day  Keep up the great work with water intake  Great work with reducing the soda! Restart exercise when cleared  Increase fruits and vegetables to 5-10 servings per day  Try to keep starch to 1/2 cup per serving

## 2022-08-30 NOTE — PROGRESS NOTES
Assessment/Plan:     Obesity, Class II, BMI 35-39 9  - Patient is pursuing Conservative Program  - Initial weight loss goal of 5-10% weight loss for improved health  - Labs reviewed: TSH 6/28/2022 was within normal limits  - Already on metformin   - Patient denies personal history of pancreatitis  Patient also denies personal and family history of medullary thyroid cancer and multiple endocrine neoplasia type 2 (MEN 2 tumor)  - Recovering well from hernia repair on 8/16/2022   - Restarted Ozempic 0 25 mg weekly on 8/23/2022  Tolerating well, hence will increase to 0 5 mg weekly after 4 weeks of 0 25 mg dose  He will monitor for signs of hypoglycemia  Initial: 245 4 lbs  Current: 242 2 lbs  Change: -3 2 lbs  Goal: 190 lbs    Goals:  Do not skip meals  Food log (ie ) www myfitnesspal com,sparkpeople  com,loseit com,calorieking  com,etc  baritastic (use skinnytaste  com, dietdoctor  com or smartphone jose e [employer] for recipes)  Try to food log consistently  0008-1791 calories per day  Keep up the great work with water intake  Great work with reducing the soda! Restart exercise when cleared  Increase fruits and vegetables to 5-10 servings per day  Try to keep starch to 1/2 cup per serving  Hyperlipidemia  - Taking atorvastatin  May improve with weight loss and lifestyle modification  Continue management with prescribing provider  Anxiety  - Taking Effexor  Continue management with prescribing provider  Obstructive sleep apnea  - Continue regular use of CPAP  - may improve with weight loss  Type 2 diabetes mellitus without complication, without long-term current use of insulin ([address] )  - Taking metformin and Ozempic  May improve with weight loss and lifestyle modification  Continue management with prescribing provider  Lab Results   Component Value Date    HGBA1C 5 6 05/17/2022          Taryn Bonner was seen today for follow-up      Diagnoses and all orders for this visit:    Obesity, Class II, BMI 35-39 9    Type 2 diabetes mellitus without complication, without long-term current use of insulin (HCC)    Hyperlipidemia, unspecified hyperlipidemia type    Obstructive sleep apnea    Anxiety        Follow up in approximately 7 weeks  with Non-Surgical Physician/Advanced Practitioner  Subjective:   Chief Complaint   Patient presents with    Follow-up     MWM 2 mth fu        Patient ID: Selam Lopez  is a 41 y o  male with excess weight/obesity here to pursue weight management  Patient is pursuing Conservative Program    Most recent notes and records were reviewed  HPI    Wt Readings from Last 10 Encounters:   08/30/22 110 kg (242 lb 3 2 oz)   08/16/22 113 kg (249 lb 12 5 oz)   06/28/22 111 kg (245 lb 6 4 oz)   06/28/22 111 kg (245 lb 6 4 oz)   05/17/22 110 kg (243 lb)   03/08/22 108 kg (237 lb)     Underwent hernia repair on 8/16/2022 and has been recovering well from that  Discharge instructions included starting Ozempic on 8/17/2022  He reports he started it last week on 8/23/2022  Currently on Ozempic 0 25 mg weekly and will increase to 0 5 mg weekly after completing 4 weeks of 0 25 mg  No negative side effects  Thinks it is helping with appetite  Intermittently food logging  Eating between 1 pm and 9 pm      B- skip  S- none  L- [employer] - turkey and cheese sandwich and chips 750 calories  S- protein shake or fruit  D- chicken and rice and beans  S- fruit    Hydration- 1 gallon of water with mint and lemon, unsweetened tea, occ soda  Alcohol- none  Exercise- none due to recent surgery    Colonoscopy: due, has order, encouraged to schedule        The following portions of the patient's history were reviewed and updated as appropriate: allergies, current medications, past family history, past medical history, past social history, past surgical history, and problem list     Review of Systems   HENT: Negative for sore throat  Respiratory: Negative for cough and shortness of breath  Cardiovascular: Negative for chest pain and palpitations  Gastrointestinal: Positive for constipation (intermittent )  Negative for abdominal pain, diarrhea, nausea and vomiting  Denies GERD  Musculoskeletal: Negative for arthralgias and back pain  Skin: Negative for rash  Psychiatric/Behavioral: Negative for suicidal ideas  Denies anxiety and depression       Objective:  /70 (BP Location: Left arm, Patient Position: Sitting, Cuff Size: Standard)   Pulse 78   Resp 12   Ht 5' 9 5" (1 765 m)   Wt 110 kg (242 lb 3 2 oz)   SpO2 96%   BMI 35 25 kg/m²     Physical Exam  Vitals and nursing note reviewed  Constitutional   General appearance: Abnormal   well developed and obese  Eyes No conjunctival injection  Ears, Nose, Mouth, and Throat Oral mucosa moist    Pulmonary   Respiratory effort: No increased work of breathing or signs of respiratory distress  Cardiovascular     Examination of extremities for edema and/or varicosities: Normal   no edema  Abdomen   Abdomen: Abnormal   The abdomen was obese      Musculoskeletal   Gait and station: Normal     Psychiatric   Orientation to person, place and time: Normal     Affect: appropriate

## 2022-09-09 ENCOUNTER — OFFICE VISIT (OUTPATIENT)
Dept: INTERNAL MEDICINE CLINIC | Facility: CLINIC | Age: 47
End: 2022-09-09
Payer: COMMERCIAL

## 2022-09-09 VITALS
HEART RATE: 80 BPM | HEIGHT: 70 IN | TEMPERATURE: 97.9 F | WEIGHT: 244 LBS | SYSTOLIC BLOOD PRESSURE: 110 MMHG | BODY MASS INDEX: 34.93 KG/M2 | RESPIRATION RATE: 13 BRPM | DIASTOLIC BLOOD PRESSURE: 78 MMHG

## 2022-09-09 DIAGNOSIS — E11.9 TYPE 2 DIABETES MELLITUS WITHOUT COMPLICATION, WITHOUT LONG-TERM CURRENT USE OF INSULIN (HCC): ICD-10-CM

## 2022-09-09 DIAGNOSIS — Z12.11 SCREEN FOR COLON CANCER: ICD-10-CM

## 2022-09-09 DIAGNOSIS — Z00.00 ANNUAL PHYSICAL EXAM: Primary | ICD-10-CM

## 2022-09-09 LAB — SL AMB POCT HEMOGLOBIN AIC: 5.7 (ref ?–6.5)

## 2022-09-09 PROCEDURE — 83036 HEMOGLOBIN GLYCOSYLATED A1C: CPT | Performed by: INTERNAL MEDICINE

## 2022-09-09 PROCEDURE — 99396 PREV VISIT EST AGE 40-64: CPT | Performed by: INTERNAL MEDICINE

## 2022-09-09 RX ORDER — BLOOD-GLUCOSE METER
KIT MISCELLANEOUS
Qty: 1 KIT | Refills: 0 | Status: SHIPPED | OUTPATIENT
Start: 2022-09-09 | End: 2022-10-21 | Stop reason: ALTCHOICE

## 2022-09-09 RX ORDER — BLOOD SUGAR DIAGNOSTIC
STRIP MISCELLANEOUS
Qty: 100 EACH | Refills: 3 | Status: SHIPPED | OUTPATIENT
Start: 2022-09-09 | End: 2022-10-21 | Stop reason: ALTCHOICE

## 2022-09-09 RX ORDER — LANCETS 33 GAUGE
EACH MISCELLANEOUS
Qty: 100 EACH | Refills: 3 | Status: SHIPPED | OUTPATIENT
Start: 2022-09-09 | End: 2022-10-21

## 2022-09-09 NOTE — PROGRESS NOTES
INTERNAL MEDICINE OFFICE VISIT  [facility] [place]    NAME: Richard Guerra  AGE: 41 y o  SEX: male    DATE OF ENCOUNTER: 9/9/2022    Assessment and Plan     1  Annual physical exam  -no findings of concern on today's exam   Continued healthy dietary and lifestyle choices, weight loss encouraged  He is established with the weight management clinic  May be considering eventual weight loss surgery  -he is now 41years old and eligible for colonoscopy  He states his weight management physician ordered referral for colonoscopy and he is going to schedule this  -follows with dentist, no vision issues  -we will order him some diabetic testing supplies to have on hand since he is on [address] in case he needs to check blood glucose at home or develops symptoms of hypoglycemia  -he will get his flu shot in October 2  Screen for colon cancer    3  Type 2 diabetes mellitus without complication, without long-term current use of insulin ([facility])  A1c 5 7 today    - Blood Glucose Monitoring Suppl (OneTouch Verio Reflect) w/Device KIT; Check blood sugars once daily  Please substitute with appropriate alternative as covered by patient's insurance  Dx: E11 65  Dispense: 1 kit; Refill: 0  - glucose blood (OneTouch Verio) test strip; Check blood sugars once daily  Please substitute with appropriate alternative as covered by patient's insurance  Dx: E11 65  Dispense: 100 each; Refill: 3  - OneTouch Delica Lancets 12Q MISC; Check blood sugars once daily  Please substitute with appropriate alternative as covered by patient's insurance  Dx: E11 65  Dispense: 100 each; Refill: 3  - POCT hemoglobin A1c  - CBC and differential; Future  - Comprehensive metabolic panel; Future  - Hemoglobin A1C; Future  - Lipid panel;  Future                 Orders Placed This Encounter   Procedures    CBC and differential    Comprehensive metabolic panel    Hemoglobin A1C    Lipid panel    POCT hemoglobin A1c       History of Present Illness     Here today for annual physical  Medical history of type 2 diabetes, hyperlipidemia, anxiety, obesity, former smoker, YURIY    He had a hospitalization in August for small bowel obstruction secondary to omental adhesion causing internal hernia  Had diagnostic laparoscopic with lysis of adhesions  Symptoms are resolved today  Laparoscopic sites are healing well  He denies any significant abdominal pain, nausea, vomiting  Was not taking his Ozempic for few weeks due to his stomach issues but has restarted this for 2-3 weeks and seems to be tolerating without issue  He has established with the weight management clinic    No concerns today  He is a former smoker, quit 8-9 years ago      The following portions of the patient's history were reviewed and updated as appropriate: allergies, current medications, past family history, past medical history, past social history, past surgical history and problem list     Chief Complaint     Chief Complaint   Patient presents with    Physical Exam     Will get flu vaccine in [place]  Review of Systems     10 point ROS negative except per HPI    Active Problem List     Patient Active Problem List   Diagnosis    Type 2 diabetes mellitus without complication, without long-term current use of insulin (HCC)    Hyperlipidemia    Anxiety    Obstructive sleep apnea    Obesity, Class II, BMI 35-39 9    SBO (small bowel obstruction) ([facility])       Objective     /78 (BP Location: Left arm, Patient Position: Sitting, Cuff Size: Large)   Pulse 80   Temp 97 9 °F (36 6 °C)   Resp 13   Ht 5' 9 5" (1 765 m)   Wt 111 kg (244 lb)   BMI 35 52 kg/m²     Physical Exam  Constitutional:       Appearance: Normal appearance  He is not ill-appearing  HENT:      Head: Normocephalic and atraumatic  Eyes:      General: No scleral icterus  Right eye: No discharge  Left eye: No discharge     Cardiovascular:      Rate and Rhythm: Normal rate and regular rhythm  Heart sounds: No murmur heard  No friction rub  Pulmonary:      Effort: Pulmonary effort is normal       Breath sounds: Normal breath sounds  No wheezing or rales  Abdominal:      General: Abdomen is flat  There is no distension  Palpations: Abdomen is soft  Tenderness: There is no abdominal tenderness  Musculoskeletal:         General: No swelling or tenderness  Skin:     General: Skin is warm and dry  Findings: No erythema  Neurological:      Mental Status: He is alert  Mental status is at baseline  Motor: No weakness  Psychiatric:         Mood and Affect: Mood normal          Behavior: Behavior normal          Pertinent Laboratory/Diagnostic Studies:  CT abdomen pelvis with contrast    Result Date: 8/16/2022  Impression: 1  Multiple dilated loops of small bowel bowel within the mid-abdomen with air-fluid levels and adjacent transition points as described above  Findings are altogether favored represent a closed-loop small bowel obstruction with suspicion for underlying  internal hernia  The involved mesenteric vasculature is mildly engorged without evidence of pneumatosis intestinalis or portal venous gas  2   Punctate bilateral nonobstructing renal calculi  I personally discussed this study with Nirali Mota on 8/16/2022 at 4:13 PM  Workstation performed: [id number]       Images and diagnostics reviewed     Current Medications     Current Outpatient Medications:     acetaminophen (TYLENOL) 325 mg tablet, Take 2 tablets (650 mg total) by mouth every 6 (six) hours as needed for mild pain, Disp: , Rfl: 0    atorvastatin (LIPITOR) 20 mg tablet, Take 20 mg by mouth daily, Disp: , Rfl:     Blood Glucose Monitoring Suppl (OneTouch Verio Reflect) w/Device KIT, Check blood sugars once daily  Please substitute with appropriate alternative as covered by patient's insurance   Dx: E11 65, Disp: 1 kit, Rfl: 0    glucose blood (OneTouch Verio) test strip, Check blood sugars once daily  Please substitute with appropriate alternative as covered by patient's insurance  Dx: E11 65, Disp: 100 each, Rfl: 3    metFORMIN (GLUCOPHAGE) 1000 MG tablet, Take 1,000 mg by mouth 2 (two) times a day, Disp: , Rfl:     OneTouch Delica Lancets 44T MISC, Check blood sugars once daily  Please substitute with appropriate alternative as covered by patient's insurance  Dx: E11 65, Disp: 100 each, Rfl: 3    Semaglutide,0 25 or 0 5MG/DOS, 2 MG/1 5ML SOPN, Inject 0 25 mg under the skin every 7 days for 30 days, THEN 0 5 mg every 7 days  , Disp: 4 5 mL, Rfl: 0    venlafaxine (EFFEXOR-XR) 37 5 mg 24 hr capsule, Take 37 5 mg by mouth daily, Disp: , Rfl:     Health Maintenance     Health Maintenance   Topic Date Due    Colorectal Cancer Screening  Never done    Influenza Vaccine (1) 09/01/2022    HEMOGLOBIN A1C  11/17/2022    Depression Screening  03/08/2023    Pneumococcal Vaccine: Pediatrics (0 to 5 Years) and At-Risk Patients (6 to 59 Years) (2 - PCV) 11/26/2022    Diabetic Foot Exam  03/08/2023    URINE MICROALBUMIN  05/17/2023    BMI: Followup Plan  08/30/2023    BMI: Adult  09/09/2023    Annual Physical  09/09/2023    DM Eye Exam  06/08/2024    DTaP,Tdap,and Td Vaccines (3 - Td or Tdap) 09/21/2025    HIV Screening  Completed    Hepatitis C Screening  Completed    COVID-19 Vaccine  Completed    HIB Vaccine  Aged Out    Hepatitis B Vaccine  Aged Out    IPV Vaccine  Aged Out    Hepatitis A Vaccine  Aged Out    Meningococcal ACWY Vaccine  Aged Out    HPV Vaccine  Aged Out     Immunization History   Administered Date(s) Administered    COVID-19 PFIZER VACCINE 0 3 ML IM 03/25/2021, 04/22/2021, 10/15/2021    Hep A, adult 09/21/2015, 05/23/2016    Hep B, adult 03/01/2016, 05/23/2016, 03/13/2017    INFLUENZA 12/30/2016, 09/20/2017, 11/26/2021    Influenza, seasonal, injectable 12/29/2010, 10/07/2011, 11/08/2013, 12/13/2014, 09/21/2015    Pneumococcal Polysaccharide PPV23 11/26/2021    Tdap 09/12/2007, 09/21/2015       Max Marylene Silvius, D O [address]  Internal Medicine [address]  [facility] [address]   Office: [phone]  Fax: [phone]

## 2022-09-28 NOTE — PROGRESS NOTES
INTERNAL MEDICINE OFFICE VISIT  [address] Internal Medicine- [place]    NAME: Kolton Freeman  AGE: 41 y o  SEX: male    DATE OF ENCOUNTER: 9/29/2022    Assessment and Plan/History of Present Illness     Here for same-day appointment for upper abdominal pain  Medical history of type 2 diabetes, hyperlipidemia, anxiety, obesity, former smoker, YURIY, small-bowel obstruction secondary to omental adhesion status post laparoscopic lysis of adhesions    2 day history of upper abdominal/epigastric pain  He states he woke up with the pain  It is constant in nature  Denies any alleviating or exacerbating factors  Not related to meals  Achy in quality  No nausea, vomiting, dysphagia, or painful swallowing  Stools are a bit hard", but he denies any constipation  He did feel gassy the night that the pain started  No significant alcohol use, does not use NSAIDs  Takes daily baby aspirin  He has also had significant fatigue, sleeping up to 12-14 hours a day  On exam he does have moderate tenderness to palpation in the epigastric area, no rebound or guarding    Of note, he had hospitalization in August for SBO secondary to omental adhesion causing internal hernia  Had diagnostic laparoscopy with lysis of adhesions  The patient is on Ozempic for weight loss and diabetes    Plan:  -given recurrence of epigastric pain, abdominal tenderness, and his recent procedure, would like to re-evaluate with CT abdomen for recurrent obstruction and also pancreatitis given his concurrent GLP1RA use, though I suspect symptoms may be benign in etiology  -check lipase, CBC, BMP, hepatic function panel      1  Screening for colorectal cancer  -     Ambulatory referral for Colonoscopy; Future; Expected date: 09/29/2022    2  Epigastric pain  -     CBC and differential; Future  -     Basic metabolic panel; Future  -     Hepatic function panel; Future  -     Lipase;  Future  -     CT abdomen pelvis w contrast; Future; Expected date: 09/29/2022             Orders Placed This Encounter   Procedures    CT abdomen pelvis w contrast    CBC and differential    Basic metabolic panel    Hepatic function panel    Lipase    Ambulatory referral for Colonoscopy       Chief Complaint     Chief Complaint   Patient presents with    upper abdominal pain     Fatigue     Discomfort in same area         Review of Systems     10 point ROS negative except per HPI    The following portions of the patient's history were reviewed and updated as appropriate: allergies, current medications, past family history, past medical history, past social history, past surgical history and problem list     Active Problem List     Patient Active Problem List   Diagnosis    Type 2 diabetes mellitus without complication, without long-term current use of insulin ([facility] [address] )    Hyperlipidemia    Anxiety    Obstructive sleep apnea    Obesity, Class II, BMI 35-39 9    SBO (small bowel obstruction) ([facility])       Objective     /64 (BP Location: Left arm, Patient Position: Sitting, Cuff Size: Standard)   Pulse 91   Temp 97 8 °F (36 6 °C)   Resp 18   Ht 5' 9 5" (1 765 m)   Wt 109 kg (240 lb)   SpO2 98%   BMI 34 93 kg/m²     Physical Exam  Constitutional:       Appearance: Normal appearance  He is not ill-appearing  HENT:      Head: Normocephalic and atraumatic  Eyes:      General: No scleral icterus  Right eye: No discharge  Left eye: No discharge  Cardiovascular:      Rate and Rhythm: Normal rate and regular rhythm  Heart sounds: No murmur heard  No friction rub  Pulmonary:      Effort: Pulmonary effort is normal       Breath sounds: Normal breath sounds  No wheezing or rales  Abdominal:      General: Abdomen is flat  There is no distension  Palpations: Abdomen is soft  Tenderness: There is no abdominal tenderness  Musculoskeletal:         General: No swelling or tenderness  Skin:     General: Skin is warm and dry  Findings: No erythema  Neurological:      Mental Status: He is alert  Mental status is at baseline  Motor: No weakness  Psychiatric:         Mood and Affect: Mood normal          Behavior: Behavior normal          Pertinent Laboratory/Diagnostic Studies:  CT abdomen pelvis with contrast    Result Date: 8/16/2022  Impression: 1  Multiple dilated loops of small bowel bowel within the mid-abdomen with air-fluid levels and adjacent transition points as described above  Findings are altogether favored represent a closed-loop small bowel obstruction with suspicion for underlying  internal hernia  The involved mesenteric vasculature is mildly engorged without evidence of pneumatosis intestinalis or portal venous gas  2   Punctate bilateral nonobstructing renal calculi  I personally discussed this study with Nancy Loaiza on 8/16/2022 at 4:13 PM  Workstation performed: [id number]       Images and diagnostics reviewed     Current Medications     Current Outpatient Medications:     acetaminophen (TYLENOL) 325 mg tablet, Take 2 tablets (650 mg total) by mouth every 6 (six) hours as needed for mild pain, Disp: , Rfl: 0    atorvastatin (LIPITOR) 20 mg tablet, Take 20 mg by mouth daily, Disp: , Rfl:     Blood Glucose Monitoring Suppl (OneTouch Verio Reflect) w/Device KIT, Check blood sugars once daily  Please substitute with appropriate alternative as covered by patient's insurance  Dx: E11 65, Disp: 1 kit, Rfl: 0    glucose blood (OneTouch Verio) test strip, Check blood sugars once daily  Please substitute with appropriate alternative as covered by patient's insurance  Dx: E11 65, Disp: 100 each, Rfl: 3    metFORMIN (GLUCOPHAGE) 1000 MG tablet, Take 1,000 mg by mouth 2 (two) times a day, Disp: , Rfl:     OneTouch Delica Lancets 11S MISC, Check blood sugars once daily  Please substitute with appropriate alternative as covered by patient's insurance   Dx: E11 65, Disp: 100 each, Rfl: 3   Semaglutide,0 25 or 0 5MG/DOS, 2 MG/1 5ML SOPN, Inject 0 25 mg under the skin every 7 days for 30 days, THEN 0 5 mg every 7 days  , Disp: 4 5 mL, Rfl: 0    venlafaxine (EFFEXOR-XR) 37 5 mg 24 hr capsule, Take 37 5 mg by mouth daily, Disp: , Rfl:     Health Maintenance     Health Maintenance   Topic Date Due    Colorectal Cancer Screening  Never done    Influenza Vaccine (1) 09/01/2022    Depression Screening  03/08/2023    Pneumococcal Vaccine: Pediatrics (0 to 5 Years) and At-Risk Patients (6 to 59 Years) (2 - PCV) 11/26/2022    Diabetic Foot Exam  03/08/2023    HEMOGLOBIN A1C  03/09/2023    URINE MICROALBUMIN  05/17/2023    BMI: Followup Plan  08/30/2023    Annual Physical  09/09/2023    BMI: Adult  09/29/2023    DM Eye Exam  06/08/2024    DTaP,Tdap,and Td Vaccines (3 - Td or Tdap) 09/21/2025    HIV Screening  Completed    Hepatitis C Screening  Completed    COVID-19 Vaccine  Completed    HIB Vaccine  Aged Out    Hepatitis B Vaccine  Aged Out    IPV Vaccine  Aged Out    Hepatitis A Vaccine  Aged Out    Meningococcal ACWY Vaccine  Aged Out    HPV Vaccine  Aged Out     Immunization History   Administered Date(s) Administered    COVID-19 PFIZER VACCINE 0 3 ML IM 03/25/2021, 04/22/2021, 10/15/2021    Hep A, adult 09/21/2015, 05/23/2016    Hep B, adult 03/01/2016, 05/23/2016, 03/13/2017    INFLUENZA 12/30/2016, 09/20/2017, 11/26/2021    Influenza, seasonal, injectable 12/29/2010, 10/07/2011, 11/08/2013, 12/13/2014, 09/21/2015    Pneumococcal Polysaccharide PPV23 11/26/2021    Tdap 09/12/2007, 09/21/2015       JESSICA Stevens  Internal Medicine [address], [facility] [id number]  [place], [address]   Office: [phone]  Fax: [phone]

## 2022-09-29 ENCOUNTER — APPOINTMENT (OUTPATIENT)
Dept: LAB | Facility: HOSPITAL | Age: 47
End: 2022-09-29
Payer: COMMERCIAL

## 2022-09-29 ENCOUNTER — HOSPITAL ENCOUNTER (OUTPATIENT)
Dept: CT IMAGING | Facility: HOSPITAL | Age: 47
Discharge: HOME | End: 2022-09-29
Attending: INTERNAL MEDICINE
Payer: COMMERCIAL

## 2022-09-29 ENCOUNTER — OFFICE VISIT (OUTPATIENT)
Dept: INTERNAL MEDICINE CLINIC | Facility: CLINIC | Age: 47
End: 2022-09-29
Payer: COMMERCIAL

## 2022-09-29 VITALS
TEMPERATURE: 97.8 F | BODY MASS INDEX: 34.36 KG/M2 | HEART RATE: 91 BPM | OXYGEN SATURATION: 98 % | RESPIRATION RATE: 18 BRPM | DIASTOLIC BLOOD PRESSURE: 64 MMHG | SYSTOLIC BLOOD PRESSURE: 130 MMHG | WEIGHT: 240 LBS | HEIGHT: 70 IN

## 2022-09-29 DIAGNOSIS — R10.13 EPIGASTRIC PAIN: ICD-10-CM

## 2022-09-29 DIAGNOSIS — Z12.12 SCREENING FOR COLORECTAL CANCER: Primary | ICD-10-CM

## 2022-09-29 DIAGNOSIS — E11.9 TYPE 2 DIABETES MELLITUS WITHOUT COMPLICATION, WITHOUT LONG-TERM CURRENT USE OF INSULIN (HCC): ICD-10-CM

## 2022-09-29 DIAGNOSIS — Z12.11 SCREENING FOR COLORECTAL CANCER: Primary | ICD-10-CM

## 2022-09-29 LAB
ALBUMIN SERPL BCP-MCNC: 4 G/DL (ref 3.5–5)
ALP SERPL-CCNC: 125 U/L (ref 46–116)
ALT SERPL W P-5'-P-CCNC: 34 U/L (ref 12–78)
ANION GAP SERPL CALCULATED.3IONS-SCNC: 9 MMOL/L (ref 4–13)
AST SERPL W P-5'-P-CCNC: 18 U/L (ref 5–45)
BASOPHILS # BLD AUTO: 0.04 THOUSANDS/ΜL (ref 0–0.1)
BASOPHILS NFR BLD AUTO: 1 % (ref 0–1)
BILIRUB DIRECT SERPL-MCNC: 0.07 MG/DL (ref 0–0.2)
BILIRUB SERPL-MCNC: 0.43 MG/DL (ref 0.2–1)
BUN SERPL-MCNC: 18 MG/DL (ref 5–25)
CALCIUM SERPL-MCNC: 9.7 MG/DL (ref 8.3–10.1)
CHLORIDE SERPL-SCNC: 100 MMOL/L (ref 96–108)
CHOLEST SERPL-MCNC: 207 MG/DL
CO2 SERPL-SCNC: 30 MMOL/L (ref 21–32)
CREAT SERPL-MCNC: 1.04 MG/DL (ref 0.6–1.3)
EOSINOPHIL # BLD AUTO: 0.27 THOUSAND/ΜL (ref 0–0.61)
EOSINOPHIL NFR BLD AUTO: 4 % (ref 0–6)
ERYTHROCYTE [DISTWIDTH] IN BLOOD BY AUTOMATED COUNT: 12.6 % (ref 11.6–15.1)
GFR SERPL CREATININE-BSD FRML MDRD: 86 ML/MIN/1.73SQ M
GLUCOSE P FAST SERPL-MCNC: 86 MG/DL (ref 65–99)
HCT VFR BLD AUTO: 43.1 % (ref 36.5–49.3)
HDLC SERPL-MCNC: 37 MG/DL
HGB BLD-MCNC: 14.5 G/DL (ref 12–17)
IMM GRANULOCYTES # BLD AUTO: 0.02 THOUSAND/UL (ref 0–0.2)
IMM GRANULOCYTES NFR BLD AUTO: 0 % (ref 0–2)
LDLC SERPL CALC-MCNC: 144 MG/DL (ref 0–100)
LIPASE SERPL-CCNC: 184 U/L (ref 73–393)
LYMPHOCYTES # BLD AUTO: 2.11 THOUSANDS/ΜL (ref 0.6–4.47)
LYMPHOCYTES NFR BLD AUTO: 31 % (ref 14–44)
MCH RBC QN AUTO: 28.5 PG (ref 26.8–34.3)
MCHC RBC AUTO-ENTMCNC: 33.6 G/DL (ref 31.4–37.4)
MCV RBC AUTO: 85 FL (ref 82–98)
MONOCYTES # BLD AUTO: 0.83 THOUSAND/ΜL (ref 0.17–1.22)
MONOCYTES NFR BLD AUTO: 12 % (ref 4–12)
NEUTROPHILS # BLD AUTO: 3.62 THOUSANDS/ΜL (ref 1.85–7.62)
NEUTS SEG NFR BLD AUTO: 52 % (ref 43–75)
NONHDLC SERPL-MCNC: 170 MG/DL
NRBC BLD AUTO-RTO: 0 /100 WBCS
PLATELET # BLD AUTO: 306 THOUSANDS/UL (ref 149–390)
PMV BLD AUTO: 9.7 FL (ref 8.9–12.7)
POTASSIUM SERPL-SCNC: 4.3 MMOL/L (ref 3.5–5.3)
PROT SERPL-MCNC: 8.7 G/DL (ref 6.4–8.4)
RBC # BLD AUTO: 5.08 MILLION/UL (ref 3.88–5.62)
SODIUM SERPL-SCNC: 139 MMOL/L (ref 135–147)
TRIGL SERPL-MCNC: 128 MG/DL
WBC # BLD AUTO: 6.89 THOUSAND/UL (ref 4.31–10.16)

## 2022-09-29 PROCEDURE — 82248 BILIRUBIN DIRECT: CPT

## 2022-09-29 PROCEDURE — 99214 OFFICE O/P EST MOD 30 MIN: CPT | Performed by: INTERNAL MEDICINE

## 2022-09-29 PROCEDURE — 80061 LIPID PANEL: CPT

## 2022-09-29 PROCEDURE — 36415 COLL VENOUS BLD VENIPUNCTURE: CPT

## 2022-09-29 PROCEDURE — 83690 ASSAY OF LIPASE: CPT

## 2022-09-29 PROCEDURE — 83036 HEMOGLOBIN GLYCOSYLATED A1C: CPT

## 2022-09-29 PROCEDURE — 74177 CT ABD & PELVIS W/CONTRAST: CPT

## 2022-09-29 PROCEDURE — 80053 COMPREHEN METABOLIC PANEL: CPT

## 2022-09-29 PROCEDURE — 85025 COMPLETE CBC W/AUTO DIFF WBC: CPT

## 2022-09-29 PROCEDURE — G1004 CDSM NDSC: HCPCS

## 2022-09-29 RX ADMIN — IOHEXOL 100 ML: 350 INJECTION, SOLUTION INTRAVENOUS at 18:18

## 2022-09-30 LAB
EST. AVERAGE GLUCOSE BLD GHB EST-MCNC: 111 MG/DL
HBA1C MFR BLD: 5.5 %

## 2022-09-30 PROCEDURE — 3044F HG A1C LEVEL LT 7.0%: CPT | Performed by: INTERNAL MEDICINE

## 2022-10-04 DIAGNOSIS — R59.1 LYMPHADENOPATHY: Primary | ICD-10-CM

## 2022-10-21 ENCOUNTER — OFFICE VISIT (OUTPATIENT)
Dept: BARIATRICS | Facility: CLINIC | Age: 47
End: 2022-10-21
Payer: COMMERCIAL

## 2022-10-21 VITALS
BODY MASS INDEX: 34.19 KG/M2 | SYSTOLIC BLOOD PRESSURE: 123 MMHG | HEIGHT: 70 IN | WEIGHT: 238.8 LBS | DIASTOLIC BLOOD PRESSURE: 78 MMHG | RESPIRATION RATE: 18 BRPM | OXYGEN SATURATION: 97 % | HEART RATE: 71 BPM

## 2022-10-21 DIAGNOSIS — E66.9 OBESITY, CLASS I, BMI 30-34.9: Primary | ICD-10-CM

## 2022-10-21 DIAGNOSIS — G47.33 OBSTRUCTIVE SLEEP APNEA: ICD-10-CM

## 2022-10-21 DIAGNOSIS — E11.9 TYPE 2 DIABETES MELLITUS WITHOUT COMPLICATION, WITHOUT LONG-TERM CURRENT USE OF INSULIN (HCC): ICD-10-CM

## 2022-10-21 DIAGNOSIS — F41.9 ANXIETY: ICD-10-CM

## 2022-10-21 DIAGNOSIS — E78.5 HYPERLIPIDEMIA, UNSPECIFIED HYPERLIPIDEMIA TYPE: ICD-10-CM

## 2022-10-21 PROBLEM — E66.811 OBESITY, CLASS I, BMI 30-34.9: Status: ACTIVE | Noted: 2022-06-28

## 2022-10-21 PROCEDURE — 99214 OFFICE O/P EST MOD 30 MIN: CPT | Performed by: NURSE PRACTITIONER

## 2022-10-21 RX ORDER — SEMAGLUTIDE 1.34 MG/ML
1 INJECTION, SOLUTION SUBCUTANEOUS WEEKLY
Qty: 15 ML | Refills: 2 | Status: SHIPPED | OUTPATIENT
Start: 2022-10-21

## 2022-10-21 NOTE — PROGRESS NOTES
Assessment/Plan:     Obesity, Class I, BMI 30-34 9  - Patient is pursuing Conservative Program   - Initially wanted weight loss surgery, but his insurance does not cover  - Initial weight loss goal of 5-10% weight loss for improved health  - Already on metformin   - Not a candidate for Topamax due to history of kidney stones  - Patient denies personal history of pancreatitis  Patient also denies personal and family history of medullary thyroid cancer and multiple endocrine neoplasia type 2 (MEN 2 tumor)  - Ozempic restarted August 2022  Weight around that time was 249 7 lbs per ED record  - Currently on Ozempic 0 5 mg weekly  No negative side effects  Has noticed an improvement with appetite, but feels that the effect could last longer  Discussed increasing to 1 mg weekly and he was agreeable  He will monitor for signs of hypoglycemia    - Labs reviewed: CBC, lipid panel, A1C, and CMP 9/29/2022  Chol and LDL elevated, HDL low, alk phos elevated, which may all improve with weight loss  Otherwise, labs within acceptable range  Initial: 245 4 lbs  Current: 238 8 lbs  Change: -6 6 lbs  Goal: 190 lbs    Goals:  Start food logging, weighing and measuring food  Eliminate soda  Keep up the great work with water intake   Increase gym to 2-3 days per week with gradual increase to 5 days per week 30 minutes cardiovascular exercise with 2 days of resistance training  1875-2106 calories per day  Try to keep starch to 1/2 cup per serving  Hyperlipidemia  - Taking atorvastatin  May improve with weight loss and lifestyle modification  Continue management with prescribing provider  Anxiety  - Taking Effexor  Continue management with prescribing provider  Obstructive sleep apnea  - Continue regular use of CPAP  - May improve with weight loss  Type 2 diabetes mellitus without complication, without long-term current use of insulin ([address] )  - Taking metformin and Ozempic   May improve with weight loss and lifestyle modification  Continue management with prescribing provider  Lab Results   Component Value Date    HGBA1C 5 5 09/29/2022          Ivone Rosenbaum was seen today for follow-up  Diagnoses and all orders for this visit:    Obesity, Class I, BMI 30-34 9  -     semaglutide, 1 mg/dose, (Ozempic, 1 MG/DOSE,) 4 MG/3ML SOPN injection pen; Inject 0 75 mL (1 mg total) under the skin once a week    Type 2 diabetes mellitus without complication, without long-term current use of insulin ([facility])  -     semaglutide, 1 mg/dose, (Ozempic, 1 MG/DOSE,) 4 MG/3ML SOPN injection pen; Inject 0 75 mL (1 mg total) under the skin once a week    Hyperlipidemia, unspecified hyperlipidemia type    Anxiety    Obstructive sleep apnea        Follow up in approximately 2 months with medical weight management provider  Subjective:   Chief Complaint   Patient presents with   • Follow-up     MWM 7 wk fu        Patient ID: Richard Guerra  is a 41 y o  male with excess weight/obesity here to pursue weight management  Patient is pursuing Conservative Program    Most recent notes and records were reviewed  HPI    Wt Readings from Last 10 Encounters:   10/21/22 108 kg (238 lb 12 8 oz)   09/29/22 109 kg (240 lb)   09/09/22 111 kg (244 lb)   08/30/22 110 kg (242 lb 3 2 oz)   08/16/22 113 kg (249 lb 12 5 oz)   06/28/22 111 kg (245 lb 6 4 oz)   06/28/22 111 kg (245 lb 6 4 oz)   05/17/22 110 kg (243 lb)   03/08/22 108 kg (237 lb)     Initially wanted weight loss surgery, but his insurance does not cover  On Ozempic 0 5 mg weekly  No negative side effects  Helping with appetite  Takes Ozempic on a Wednesday and by Sunday feels very hungry  On metformin 1000 mg twice a day  No negative side effects  No difference with appetite  Not food logging   Will be moving soon and plans on starting food logging after getting settled        B- yogurt and cheese stick  S- none  L- Tuna wrap or lebanon bologna wrap with cheese S- overnight oats  D- chicken and rice and beans  S- fruit or ice pops      Hydration- 1 gallon of water with mint and lemon, 1-2 sodas daily, unsweetened tea daily   Alcohol- none  Exercise- gym one day per week treadmill   Sleep 6-8 hours  Has YURIY, uses CPAP regularly       Colonoscopy: due, scheduled with GI later this month        The following portions of the patient's history were reviewed and updated as appropriate: allergies, current medications, past family history, past medical history, past social history, past surgical history, and problem list     Family History   Family history unknown: Yes        Review of Systems    Objective:  /78   Pulse 71   Resp 18   Ht 5' 9 5" (1 765 m)   Wt 108 kg (238 lb 12 8 oz)   SpO2 97%   BMI 34 76 kg/m²     Physical Exam  Vitals and nursing note reviewed  Constitutional   General appearance: Abnormal   well developed and obese  Eyes No conjunctival injection  Ears, Nose, Mouth, and Throat Oral mucosa moist    Pulmonary   Respiratory effort: No increased work of breathing or signs of respiratory distress  Cardiovascular     Examination of extremities for edema and/or varicosities: Normal   no edema  Abdomen   Abdomen: Abnormal   The abdomen was obese      Musculoskeletal   Normal range of motion  Neurological   Gait and station: Normal     Psychiatric   Orientation to person, place and time: Normal     Affect: appropriate

## 2022-10-22 NOTE — ASSESSMENT & PLAN NOTE
- Patient is pursuing Conservative Program   - Initially wanted weight loss surgery, but his insurance does not cover  - Initial weight loss goal of 5-10% weight loss for improved health  - Already on metformin   - Not a candidate for Topamax due to history of kidney stones  - Patient denies personal history of pancreatitis  Patient also denies personal and family history of medullary thyroid cancer and multiple endocrine neoplasia type 2 (MEN 2 tumor)  - Ozempic restarted August 2022  Weight around that time was 249 7 lbs per ED record  - Currently on Ozempic 0 5 mg weekly  No negative side effects  Has noticed an improvement with appetite, but feels that the effect could last longer  Discussed increasing to 1 mg weekly and he was agreeable  He will monitor for signs of hypoglycemia    - Labs reviewed: CBC, lipid panel, A1C, and CMP 9/29/2022  Chol and LDL elevated, HDL low, alk phos elevated, which may all improve with weight loss  Otherwise, labs within acceptable range  Initial: 245 4 lbs  Current: 238 8 lbs  Change: -6 6 lbs  Goal: 190 lbs    Goals:  Start food logging, weighing and measuring food  Eliminate soda  Keep up the great work with water intake   Increase gym to 2-3 days per week with gradual increase to 5 days per week 30 minutes cardiovascular exercise with 2 days of resistance training  5877-7156 calories per day  Try to keep starch to 1/2 cup per serving

## 2022-10-22 NOTE — ASSESSMENT & PLAN NOTE
- Taking metformin and Ozempic  May improve with weight loss and lifestyle modification  Continue management with prescribing provider       Lab Results   Component Value Date    HGBA1C 5 5 09/29/2022

## 2022-11-01 ENCOUNTER — PATIENT MESSAGE (OUTPATIENT)
Dept: INTERNAL MEDICINE CLINIC | Facility: CLINIC | Age: 47
End: 2022-11-01

## 2022-11-01 DIAGNOSIS — Z23 ENCOUNTER FOR IMMUNIZATION: ICD-10-CM

## 2022-11-01 DIAGNOSIS — E78.5 HYPERLIPIDEMIA, UNSPECIFIED HYPERLIPIDEMIA TYPE: Primary | ICD-10-CM

## 2022-11-01 RX ORDER — ATORVASTATIN CALCIUM 20 MG/1
20 TABLET, FILM COATED ORAL DAILY
Qty: 90 TABLET | Refills: 1 | Status: SHIPPED | OUTPATIENT
Start: 2022-11-01

## 2022-11-01 NOTE — TELEPHONE ENCOUNTER
From: Herrera Canseco  To: Raymond De Luna  Sent: 11/1/2022 8:43 AM EDT  Subject: Refill    I need a refill on atorvastatin 20 mg tablet  Ran out a week or two ago  [facility] said it was rejected and I need an appointment with you for refill but I was just in last month and had a bunch of labs recently   Thank you

## 2022-11-21 ENCOUNTER — HOSPITAL ENCOUNTER (EMERGENCY)
Facility: HOSPITAL | Age: 47
Discharge: HOME | End: 2022-11-21
Attending: EMERGENCY MEDICINE

## 2022-11-21 ENCOUNTER — APPOINTMENT (EMERGENCY)
Dept: CT IMAGING | Facility: HOSPITAL | Age: 47
End: 2022-11-21

## 2022-11-21 ENCOUNTER — HOSPITAL ENCOUNTER (OUTPATIENT)
Facility: HOSPITAL | Age: 47
Setting detail: OBSERVATION
Discharge: HOME | End: 2022-11-22
Attending: EMERGENCY MEDICINE | Admitting: INTERNAL MEDICINE

## 2022-11-21 VITALS
BODY MASS INDEX: 34.22 KG/M2 | SYSTOLIC BLOOD PRESSURE: 109 MMHG | HEIGHT: 70 IN | RESPIRATION RATE: 16 BRPM | OXYGEN SATURATION: 95 % | WEIGHT: 239 LBS | DIASTOLIC BLOOD PRESSURE: 62 MMHG | HEART RATE: 82 BPM | TEMPERATURE: 97.9 F

## 2022-11-21 DIAGNOSIS — R55 NEAR SYNCOPE: ICD-10-CM

## 2022-11-21 DIAGNOSIS — R11.0 NAUSEA: Primary | ICD-10-CM

## 2022-11-21 DIAGNOSIS — I95.9 HYPOTENSION: ICD-10-CM

## 2022-11-21 DIAGNOSIS — R10.9 ABDOMINAL PAIN: Primary | ICD-10-CM

## 2022-11-21 LAB
ALBUMIN SERPL BCP-MCNC: 3.4 G/DL (ref 3.5–5)
ALBUMIN SERPL BCP-MCNC: 3.6 G/DL (ref 3.5–5)
ALP SERPL-CCNC: 152 U/L (ref 46–116)
ALP SERPL-CCNC: 152 U/L (ref 46–116)
ALT SERPL W P-5'-P-CCNC: 24 U/L (ref 12–78)
ALT SERPL W P-5'-P-CCNC: 26 U/L (ref 12–78)
ANION GAP SERPL CALCULATED.3IONS-SCNC: 10 MMOL/L (ref 4–13)
ANION GAP SERPL CALCULATED.3IONS-SCNC: 7 MMOL/L (ref 4–13)
AST SERPL W P-5'-P-CCNC: 18 U/L (ref 5–45)
ATRIAL RATE: 144 BPM
ATRIAL RATE: 156 BPM
BASOPHILS # BLD AUTO: 0.01 THOUSANDS/ÂΜL (ref 0–0.1)
BASOPHILS # BLD AUTO: 0.02 THOUSANDS/ÂΜL (ref 0–0.1)
BASOPHILS NFR BLD AUTO: 0 % (ref 0–1)
BASOPHILS NFR BLD AUTO: 0 % (ref 0–1)
BILIRUB SERPL-MCNC: 0.63 MG/DL (ref 0.2–1)
BILIRUB SERPL-MCNC: 0.71 MG/DL (ref 0.2–1)
BUN SERPL-MCNC: 16 MG/DL (ref 5–25)
BUN SERPL-MCNC: 17 MG/DL (ref 5–25)
CALCIUM ALBUM COR SERPL-MCNC: 9 MG/DL (ref 8.3–10.1)
CALCIUM SERPL-MCNC: 8.5 MG/DL (ref 8.3–10.1)
CALCIUM SERPL-MCNC: 8.8 MG/DL (ref 8.3–10.1)
CARDIAC TROPONIN I PNL SERPL HS: <2 NG/L
CARDIAC TROPONIN I PNL SERPL HS: <2 NG/L
CHLORIDE SERPL-SCNC: 102 MMOL/L (ref 96–108)
CHLORIDE SERPL-SCNC: 103 MMOL/L (ref 96–108)
CO2 SERPL-SCNC: 27 MMOL/L (ref 21–32)
CO2 SERPL-SCNC: 32 MMOL/L (ref 21–32)
CREAT SERPL-MCNC: 0.96 MG/DL (ref 0.6–1.3)
CREAT SERPL-MCNC: 1.02 MG/DL (ref 0.6–1.3)
EOSINOPHIL # BLD AUTO: 0.43 THOUSAND/ÂΜL (ref 0–0.61)
EOSINOPHIL # BLD AUTO: 0.47 THOUSAND/ÂΜL (ref 0–0.61)
EOSINOPHIL NFR BLD AUTO: 4 % (ref 0–6)
EOSINOPHIL NFR BLD AUTO: 4 % (ref 0–6)
ERYTHROCYTE [DISTWIDTH] IN BLOOD BY AUTOMATED COUNT: 12.7 % (ref 11.6–15.1)
ERYTHROCYTE [DISTWIDTH] IN BLOOD BY AUTOMATED COUNT: 12.8 % (ref 11.6–15.1)
GFR SERPL CREATININE-BSD FRML MDRD: 88 ML/MIN/1.73SQ M
GFR SERPL CREATININE-BSD FRML MDRD: 95 ML/MIN/1.73SQ M
GLUCOSE SERPL-MCNC: 118 MG/DL (ref 65–140)
GLUCOSE SERPL-MCNC: 157 MG/DL (ref 65–140)
GLUCOSE SERPL-MCNC: 94 MG/DL (ref 65–140)
HCT VFR BLD AUTO: 44.5 % (ref 36.5–49.3)
HCT VFR BLD AUTO: 47.2 % (ref 36.5–49.3)
HGB BLD-MCNC: 15.3 G/DL (ref 12–17)
HGB BLD-MCNC: 16.6 G/DL (ref 12–17)
IMM GRANULOCYTES # BLD AUTO: 0.02 THOUSAND/UL (ref 0–0.2)
IMM GRANULOCYTES # BLD AUTO: 0.03 THOUSAND/UL (ref 0–0.2)
IMM GRANULOCYTES NFR BLD AUTO: 0 % (ref 0–2)
IMM GRANULOCYTES NFR BLD AUTO: 0 % (ref 0–2)
LACTATE SERPL-SCNC: 1.3 MMOL/L (ref 0.5–2)
LIPASE SERPL-CCNC: 138 U/L (ref 73–393)
LYMPHOCYTES # BLD AUTO: 1.44 THOUSANDS/ÂΜL (ref 0.6–4.47)
LYMPHOCYTES # BLD AUTO: 2.45 THOUSANDS/ÂΜL (ref 0.6–4.47)
LYMPHOCYTES NFR BLD AUTO: 14 % (ref 14–44)
LYMPHOCYTES NFR BLD AUTO: 19 % (ref 14–44)
MCH RBC QN AUTO: 29 PG (ref 26.8–34.3)
MCH RBC QN AUTO: 29.2 PG (ref 26.8–34.3)
MCHC RBC AUTO-ENTMCNC: 34.4 G/DL (ref 31.4–37.4)
MCHC RBC AUTO-ENTMCNC: 35.2 G/DL (ref 31.4–37.4)
MCV RBC AUTO: 83 FL (ref 82–98)
MCV RBC AUTO: 84 FL (ref 82–98)
MONOCYTES # BLD AUTO: 0.64 THOUSAND/ÂΜL (ref 0.17–1.22)
MONOCYTES # BLD AUTO: 0.9 THOUSAND/ÂΜL (ref 0.17–1.22)
MONOCYTES NFR BLD AUTO: 6 % (ref 4–12)
MONOCYTES NFR BLD AUTO: 7 % (ref 4–12)
NEUTROPHILS # BLD AUTO: 7.69 THOUSANDS/ÂΜL (ref 1.85–7.62)
NEUTROPHILS # BLD AUTO: 9.09 THOUSANDS/ÂΜL (ref 1.85–7.62)
NEUTS SEG NFR BLD AUTO: 70 % (ref 43–75)
NEUTS SEG NFR BLD AUTO: 76 % (ref 43–75)
NRBC BLD AUTO-RTO: 0 /100 WBCS
NRBC BLD AUTO-RTO: 0 /100 WBCS
PLATELET # BLD AUTO: 303 THOUSANDS/UL (ref 149–390)
PLATELET # BLD AUTO: 311 THOUSANDS/UL (ref 149–390)
PMV BLD AUTO: 9.4 FL (ref 8.9–12.7)
PMV BLD AUTO: 9.5 FL (ref 8.9–12.7)
POTASSIUM SERPL-SCNC: 3.6 MMOL/L (ref 3.5–5.3)
POTASSIUM SERPL-SCNC: 4 MMOL/L (ref 3.5–5.3)
PROT SERPL-MCNC: 6.9 G/DL (ref 6.4–8.4)
PROT SERPL-MCNC: 7.6 G/DL (ref 6.4–8.4)
QRS AXIS: 48 DEGREES
QRS AXIS: 74 DEGREES
QRSD INTERVAL: 84 MS
QRSD INTERVAL: 84 MS
QT INTERVAL: 362 MS
QT INTERVAL: 384 MS
QTC INTERVAL: 409 MS
QTC INTERVAL: 411 MS
RBC # BLD AUTO: 5.28 MILLION/UL (ref 3.88–5.62)
RBC # BLD AUTO: 5.69 MILLION/UL (ref 3.88–5.62)
SODIUM SERPL-SCNC: 140 MMOL/L (ref 135–147)
SODIUM SERPL-SCNC: 141 MMOL/L (ref 135–147)
T WAVE AXIS: 22 DEGREES
T WAVE AXIS: 43 DEGREES
VENTRICULAR RATE: 69 BPM
VENTRICULAR RATE: 77 BPM
WBC # BLD AUTO: 10.23 THOUSAND/UL (ref 4.31–10.16)
WBC # BLD AUTO: 12.96 THOUSAND/UL (ref 4.31–10.16)

## 2022-11-21 RX ORDER — ATORVASTATIN CALCIUM 20 MG/1
20 TABLET, FILM COATED ORAL
Status: DISCONTINUED | OUTPATIENT
Start: 2022-11-22 | End: 2022-11-22 | Stop reason: HOSPADM

## 2022-11-21 RX ORDER — ONDANSETRON 2 MG/ML
4 INJECTION INTRAMUSCULAR; INTRAVENOUS EVERY 6 HOURS PRN
Status: DISCONTINUED | OUTPATIENT
Start: 2022-11-21 | End: 2022-11-22 | Stop reason: HOSPADM

## 2022-11-21 RX ORDER — ONDANSETRON 2 MG/ML
4 INJECTION INTRAMUSCULAR; INTRAVENOUS ONCE
Status: COMPLETED | OUTPATIENT
Start: 2022-11-21 | End: 2022-11-21

## 2022-11-21 RX ORDER — DIPHENHYDRAMINE HYDROCHLORIDE 50 MG/ML
25 INJECTION INTRAMUSCULAR; INTRAVENOUS ONCE
Status: COMPLETED | OUTPATIENT
Start: 2022-11-21 | End: 2022-11-21

## 2022-11-21 RX ORDER — MAGNESIUM HYDROXIDE/ALUMINUM HYDROXICE/SIMETHICONE 120; 1200; 1200 MG/30ML; MG/30ML; MG/30ML
30 SUSPENSION ORAL ONCE
Status: COMPLETED | OUTPATIENT
Start: 2022-11-21 | End: 2022-11-21

## 2022-11-21 RX ORDER — ACETAMINOPHEN 325 MG/1
650 TABLET ORAL EVERY 6 HOURS PRN
Status: DISCONTINUED | OUTPATIENT
Start: 2022-11-21 | End: 2022-11-22 | Stop reason: HOSPADM

## 2022-11-21 RX ORDER — VENLAFAXINE HYDROCHLORIDE 37.5 MG/1
37.5 CAPSULE, EXTENDED RELEASE ORAL DAILY
Status: DISCONTINUED | OUTPATIENT
Start: 2022-11-22 | End: 2022-11-22 | Stop reason: HOSPADM

## 2022-11-21 RX ORDER — INSULIN LISPRO 100 [IU]/ML
1-6 INJECTION, SOLUTION INTRAVENOUS; SUBCUTANEOUS
Status: DISCONTINUED | OUTPATIENT
Start: 2022-11-22 | End: 2022-11-22 | Stop reason: HOSPADM

## 2022-11-21 RX ORDER — METOCLOPRAMIDE HYDROCHLORIDE 5 MG/ML
10 INJECTION INTRAMUSCULAR; INTRAVENOUS ONCE
Status: COMPLETED | OUTPATIENT
Start: 2022-11-21 | End: 2022-11-21

## 2022-11-21 RX ORDER — MAGNESIUM HYDROXIDE/ALUMINUM HYDROXICE/SIMETHICONE 120; 1200; 1200 MG/30ML; MG/30ML; MG/30ML
30 SUSPENSION ORAL EVERY 6 HOURS PRN
Status: DISCONTINUED | OUTPATIENT
Start: 2022-11-21 | End: 2022-11-22 | Stop reason: HOSPADM

## 2022-11-21 RX ORDER — SODIUM CHLORIDE 9 MG/ML
150 INJECTION, SOLUTION INTRAVENOUS CONTINUOUS
Status: DISCONTINUED | OUTPATIENT
Start: 2022-11-21 | End: 2022-11-22 | Stop reason: HOSPADM

## 2022-11-21 RX ORDER — MORPHINE SULFATE 4 MG/ML
4 INJECTION, SOLUTION INTRAMUSCULAR; INTRAVENOUS ONCE
Status: COMPLETED | OUTPATIENT
Start: 2022-11-21 | End: 2022-11-21

## 2022-11-21 RX ADMIN — SODIUM CHLORIDE 1000 ML: 0.9 INJECTION, SOLUTION INTRAVENOUS at 16:15

## 2022-11-21 RX ADMIN — SODIUM CHLORIDE 150 ML/HR: 0.9 INJECTION, SOLUTION INTRAVENOUS at 21:08

## 2022-11-21 RX ADMIN — DIPHENHYDRAMINE HYDROCHLORIDE 25 MG: 50 INJECTION, SOLUTION INTRAMUSCULAR; INTRAVENOUS at 12:56

## 2022-11-21 RX ADMIN — ALUMINUM HYDROXIDE, MAGNESIUM HYDROXIDE, AND SIMETHICONE 30 ML: 200; 200; 20 SUSPENSION ORAL at 12:56

## 2022-11-21 RX ADMIN — SODIUM CHLORIDE 1000 ML: 0.9 INJECTION, SOLUTION INTRAVENOUS at 14:41

## 2022-11-21 RX ADMIN — METOCLOPRAMIDE 10 MG: 5 INJECTION, SOLUTION INTRAMUSCULAR; INTRAVENOUS at 12:56

## 2022-11-21 RX ADMIN — IOHEXOL 100 ML: 350 INJECTION, SOLUTION INTRAVENOUS at 11:52

## 2022-11-21 RX ADMIN — ONDANSETRON 4 MG: 2 INJECTION INTRAMUSCULAR; INTRAVENOUS at 10:31

## 2022-11-21 RX ADMIN — SODIUM CHLORIDE 1000 ML: 0.9 INJECTION, SOLUTION INTRAVENOUS at 10:28

## 2022-11-21 RX ADMIN — MORPHINE SULFATE 4 MG: 4 INJECTION INTRAVENOUS at 10:31

## 2022-11-21 NOTE — ED PROVIDER NOTES
Pt Name: Jose Murphy  MRN: [id number]  [place] 1975  Age/Sex: 41 y o  male  Date of evaluation: 11/21/2022  PCP: Aimee Bhatti, [address] [place] [address]    Chief Complaint   Patient presents with   • Abdominal Pain     Severe abd pain with nausea, vomiting, diarrhea, hx of hernia with bowel obstruction         HPI    Roslyn Wellington presents to the Emergency Department complaining of abdominal pain  He had an internal hernia with bowel obstruction in the past and had surgery for lysis of adhesions  This feels similar so he was concerned  No fever  He had similar pain a week ago and it seemed to go away but now is has returned  He attributes this to ozempic which he is taking for weight loss  HPI      Past Medical and Surgical History    Past Medical History:   Diagnosis Date   • Diabetes ([address] )    • Hyperlipidemia    • YURIY on CPAP        Past Surgical History:   Procedure Laterality Date   • SD LAP,DIAGNOSTIC ABDOMEN N/A 8/16/2022    Procedure: LAPAROSCOPY DIAGNOSTIC, LYSIS OF ADHESIONS, REDUCTION OF INTERNAL HERNIA;  Surgeon: Steen Carrel, MD;  Location: [facility] OR;  Service: General   • VASECTOMY         Family History   Family history unknown: Yes       Social History     Tobacco Use   • Smoking status: Former     Packs/day: 3 00     Types: Cigarettes   • Smokeless tobacco: Never   • Tobacco comments:     8 YRS AGO   Vaping Use   • Vaping Use: Never used   Substance Use Topics   • Alcohol use: Never   • Drug use: Never           Allergies    No Known Allergies    Home Medications    Prior to Admission medications    Medication Sig Start Date End Date Taking?  Authorizing Provider   acetaminophen (TYLENOL) 325 mg tablet Take 2 tablets (650 mg total) by mouth every 6 (six) hours as needed for mild pain  Patient not taking: Reported on 10/21/2022 8/17/22   Dylon Nuñez PA-C   atorvastatin (LIPITOR) 20 mg tablet Take 1 tablet (20 mg total) by mouth daily 11/1/22   Raymond Rojas DO metFORMIN (GLUCOPHAGE) 1000 MG tablet Take 1,000 mg by mouth 2 (two) times a day 6/13/22   Historical Provider, MD   semaglutide, 1 mg/dose, (Ozempic, 1 MG/DOSE,) 4 MG/3ML SOPN injection pen Inject 0 75 mL (1 mg total) under the skin once a week 10/21/22   MADIHA Sow   venlafaxine (EFFEXOR-XR) 37 5 mg 24 hr capsule Take 37 5 mg by mouth daily 11/26/21   Historical Provider, MD           Review of Systems    Review of Systems   Constitutional: Negative for chills and fever  HENT: Negative for ear pain and sore throat  Eyes: Negative for pain and visual disturbance  Respiratory: Negative for cough and shortness of breath  Cardiovascular: Negative for chest pain and palpitations  Gastrointestinal: Positive for abdominal pain, diarrhea, nausea and vomiting  Genitourinary: Negative for dysuria and hematuria  Musculoskeletal: Negative for arthralgias and back pain  Skin: Negative for color change and rash  Neurological: Negative for seizures and syncope  All other systems reviewed and are negative  Physical Exam      ED Triage Vitals   Temperature Pulse Respirations Blood Pressure SpO2   11/21/22 1018 11/21/22 1018 11/21/22 1018 11/21/22 1018 11/21/22 1018   97 9 °F (36 6 °C) 100 15 132/78 98 %      Temp Source Heart Rate Source Patient Position - Orthostatic VS BP Location FiO2 (%)   11/21/22 1018 11/21/22 1120 11/21/22 1120 11/21/22 1120 --   Oral Monitor Sitting Right arm       Pain Score       11/21/22 1031       10 - Worst Possible Pain               Physical Exam  Vitals and nursing note reviewed  Constitutional:       General: He is not in acute distress  Appearance: He is well-developed and well-nourished  He is not diaphoretic  HENT:      Head: Normocephalic and atraumatic        Nose: Nose normal       Mouth/Throat:      Mouth: Oropharynx is clear and moist    Eyes:      Extraocular Movements: EOM normal       Conjunctiva/sclera: Conjunctivae normal       Pupils: Pupils are equal, round, and reactive to light  Cardiovascular:      Rate and Rhythm: Normal rate and regular rhythm  Heart sounds: Normal heart sounds  No murmur heard  No friction rub  No gallop  Pulmonary:      Effort: Pulmonary effort is normal  No respiratory distress  Breath sounds: Normal breath sounds  No wheezing or rales  Abdominal:      General: Bowel sounds are normal       Palpations: Abdomen is soft  Tenderness: There is abdominal tenderness in the epigastric area  There is no guarding or rebound  Musculoskeletal:         General: Normal range of motion  Cervical back: Normal range of motion and neck supple  Skin:     General: Skin is warm and dry  Neurological:      Mental Status: He is alert and oriented to person, place, and time  Psychiatric:         Mood and Affect: Mood and affect normal          Behavior: Behavior normal                 Assessment and Plan    Karl Muñoz is a 41 y o  male who presents with abdominal pain and nausea  Physical examination remarkable for same  Differential diagnosis (not completely inclusive) includes intra-abdominal pathology  Plan will be to perform diagnostic testing and treat symptomatically        MDM    Diagnostic Results      Labs:    Results for orders placed or performed during the hospital encounter of 11/21/22   CBC and differential   Result Value Ref Range    WBC 10 23 (H) 4 31 - 10 16 Thousand/uL    RBC 5 69 (H) 3 88 - 5 62 Million/uL    Hemoglobin 16 6 12 0 - 17 0 g/dL    Hematocrit 47 2 36 5 - 49 3 %    MCV 83 82 - 98 fL    MCH 29 2 26 8 - 34 3 pg    MCHC 35 2 31 4 - 37 4 g/dL    RDW 12 7 11 6 - 15 1 %    MPV 9 4 8 9 - 12 7 fL    Platelets 785 778 - 460 Thousands/uL    nRBC 0 /100 WBCs    Neutrophils Relative 76 (H) 43 - 75 %    Immat GRANS % 0 0 - 2 %    Lymphocytes Relative 14 14 - 44 %    Monocytes Relative 6 4 - 12 %    Eosinophils Relative 4 0 - 6 %    Basophils Relative 0 0 - 1 %    Neutrophils Absolute 7 69 (H) 1 85 - 7 62 Thousands/µL    Immature Grans Absolute 0 02 0 00 - 0 20 Thousand/uL    Lymphocytes Absolute 1 44 0 60 - 4 47 Thousands/µL    Monocytes Absolute 0 64 0 17 - 1 22 Thousand/µL    Eosinophils Absolute 0 43 0 00 - 0 61 Thousand/µL    Basophils Absolute 0 01 0 00 - 0 10 Thousands/µL   Comprehensive metabolic panel   Result Value Ref Range    Sodium 141 135 - 147 mmol/L    Potassium 4 0 3 5 - 5 3 mmol/L    Chloride 102 96 - 108 mmol/L    CO2 32 21 - 32 mmol/L    ANION GAP 7 4 - 13 mmol/L    BUN 17 5 - 25 mg/dL    Creatinine 0 96 0 60 - 1 30 mg/dL    Glucose 118 65 - 140 mg/dL    Calcium 8 8 8 3 - 10 1 mg/dL    AST      ALT 26 12 - 78 U/L    Alkaline Phosphatase 152 (H) 46 - 116 U/L    Total Protein 7 6 6 4 - 8 4 g/dL    Albumin 3 6 3 5 - 5 0 g/dL    Total Bilirubin 0 71 0 20 - 1 00 mg/dL    eGFR 95 ml/min/1 73sq m   Lipase   Result Value Ref Range    Lipase 138 73 - 393 u/L   Lactic acid, plasma   Result Value Ref Range    LACTIC ACID 1 3 0 5 - 2 0 mmol/L       All labs reviewed and utilized in the medical decision making process    Radiology:    CT abdomen pelvis with contrast   Final Result      Nonobstructing bilateral renal calculi  No hydronephrosis  Workstation performed: [id number]             All radiology studies independently viewed by me and interpreted by the radiologist     Procedure    Procedures      ED Course of Care and Re-Assessments    Patient was feeling better but still with nausea  Work up unremarkable  Plan DC       Medications   sodium chloride 0 9 % bolus 1,000 mL (0 mL Intravenous Stopped 11/21/22 1128)   ondansetron (ZOFRAN) injection 4 mg (4 mg Intravenous Given 11/21/22 1031)   morphine injection 4 mg (4 mg Intravenous Given 11/21/22 1031)   iohexol (OMNIPAQUE) 350 MG/ML injection (MULTI-DOSE) 100 mL (100 mL Intravenous Given 11/21/22 1152)   metoclopramide (REGLAN) injection 10 mg (10 mg Intravenous Given 11/21/22 1256)   diphenhydrAMINE (BENADRYL) injection 25 mg (25 mg Intravenous Given 11/21/22 1256)   aluminum-magnesium hydroxide-simethicone (MYLANTA) oral suspension 30 mL (30 mL Oral Given 11/21/22 1256)           FINAL IMPRESSION    Final diagnoses:   Abdominal pain         DISPOSITION/PLAN    Time reflects when diagnosis was documented in both MDM as applicable and the Disposition within this note     Time User Action Codes Description Comment    11/21/2022  1:29 PM Hair Bartholomew [R10 9] Abdominal pain       ED Disposition     ED Disposition   Discharge    Condition   Stable    Date/Time   Mon Nov 21, 2022  1:29 PM    Comment   Kiana Campos discharge to home/self care                 Follow-up Information     Follow up With Specialties Details Why Contact Info    Raymond Mcgill,  Internal Medicine Schedule an appointment as soon as possible for a visit   [address] [place] [phone]              PATIENT REFERRED TO:    Kyra Davis, [address] [place] [address]  [address]  [email or web address] [id number]  [phone]    Schedule an appointment as soon as possible for a visit         DISCHARGE MEDICATIONS:    Discharge Medication List as of 11/21/2022  1:30 PM      CONTINUE these medications which have NOT CHANGED    Details   atorvastatin (LIPITOR) 20 mg tablet Take 1 tablet (20 mg total) by mouth daily, Starting Tue 11/1/2022, Normal      metFORMIN (GLUCOPHAGE) 1000 MG tablet Take 1,000 mg by mouth 2 (two) times a day, Starting Mon 6/13/2022, Historical Med      semaglutide, 1 mg/dose, (Ozempic, 1 MG/DOSE,) 4 MG/3ML SOPN injection pen Inject 0 75 mL (1 mg total) under the skin once a week, Starting Fri 10/21/2022, Normal      venlafaxine (EFFEXOR-XR) 37 5 mg 24 hr capsule Take 37 5 mg by mouth daily, Starting Fri 11/26/2021, Historical Med      acetaminophen (TYLENOL) 325 mg tablet Take 2 tablets (650 mg total) by mouth every 6 (six) hours as needed for mild pain, Starting Wed 8/17/2022, No Print             No discharge procedures on file           DO Alli Gore DO  11/21/22 1926

## 2022-11-22 VITALS
WEIGHT: 230 LBS | BODY MASS INDEX: 34.07 KG/M2 | TEMPERATURE: 97.9 F | SYSTOLIC BLOOD PRESSURE: 101 MMHG | HEART RATE: 76 BPM | OXYGEN SATURATION: 96 % | HEIGHT: 69 IN | DIASTOLIC BLOOD PRESSURE: 50 MMHG | RESPIRATION RATE: 16 BRPM

## 2022-11-22 PROBLEM — R10.9 ABDOMINAL PAIN: Status: RESOLVED | Noted: 2022-11-21 | Resolved: 2022-11-22

## 2022-11-22 LAB
ANION GAP SERPL CALCULATED.3IONS-SCNC: 8 MMOL/L (ref 4–13)
BILIRUB UR QL STRIP: NEGATIVE
BUN SERPL-MCNC: 13 MG/DL (ref 5–25)
CALCIUM SERPL-MCNC: 8.1 MG/DL (ref 8.3–10.1)
CHLORIDE SERPL-SCNC: 109 MMOL/L (ref 96–108)
CLARITY UR: CLEAR
CO2 SERPL-SCNC: 25 MMOL/L (ref 21–32)
COLOR UR: YELLOW
CORTIS SERPL-MCNC: 5.1 UG/DL
CREAT SERPL-MCNC: 0.8 MG/DL (ref 0.6–1.3)
ERYTHROCYTE [DISTWIDTH] IN BLOOD BY AUTOMATED COUNT: 12.8 % (ref 11.6–15.1)
FLUAV RNA RESP QL NAA+PROBE: NEGATIVE
FLUBV RNA RESP QL NAA+PROBE: NEGATIVE
GFR SERPL CREATININE-BSD FRML MDRD: 107 ML/MIN/1.73SQ M
GLUCOSE P FAST SERPL-MCNC: 91 MG/DL (ref 65–99)
GLUCOSE SERPL-MCNC: 112 MG/DL (ref 65–140)
GLUCOSE SERPL-MCNC: 85 MG/DL (ref 65–140)
GLUCOSE SERPL-MCNC: 91 MG/DL (ref 65–140)
GLUCOSE UR STRIP-MCNC: NEGATIVE MG/DL
HCT VFR BLD AUTO: 38.8 % (ref 36.5–49.3)
HGB BLD-MCNC: 13.2 G/DL (ref 12–17)
HGB UR QL STRIP.AUTO: NEGATIVE
KETONES UR STRIP-MCNC: NEGATIVE MG/DL
LEUKOCYTE ESTERASE UR QL STRIP: NEGATIVE
MCH RBC QN AUTO: 28.9 PG (ref 26.8–34.3)
MCHC RBC AUTO-ENTMCNC: 34 G/DL (ref 31.4–37.4)
MCV RBC AUTO: 85 FL (ref 82–98)
NITRITE UR QL STRIP: NEGATIVE
PH UR STRIP.AUTO: 5.5 [PH]
PLATELET # BLD AUTO: 262 THOUSANDS/UL (ref 149–390)
PMV BLD AUTO: 10 FL (ref 8.9–12.7)
POTASSIUM SERPL-SCNC: 3.9 MMOL/L (ref 3.5–5.3)
PROCALCITONIN SERPL-MCNC: 0.13 NG/ML
PROT UR STRIP-MCNC: NEGATIVE MG/DL
RBC # BLD AUTO: 4.57 MILLION/UL (ref 3.88–5.62)
RSV RNA RESP QL NAA+PROBE: NEGATIVE
SARS-COV-2 RNA RESP QL NAA+PROBE: NEGATIVE
SODIUM SERPL-SCNC: 142 MMOL/L (ref 135–147)
SP GR UR STRIP.AUTO: 1.02 (ref 1–1.03)
UROBILINOGEN UR QL STRIP.AUTO: 0.2 E.U./DL
WBC # BLD AUTO: 10.11 THOUSAND/UL (ref 4.31–10.16)

## 2022-11-22 RX ADMIN — SODIUM CHLORIDE 150 ML/HR: 0.9 INJECTION, SOLUTION INTRAVENOUS at 10:14

## 2022-11-22 RX ADMIN — SODIUM CHLORIDE 150 ML/HR: 0.9 INJECTION, SOLUTION INTRAVENOUS at 03:36

## 2022-11-22 RX ADMIN — VENLAFAXINE HYDROCHLORIDE 37.5 MG: 37.5 CAPSULE, EXTENDED RELEASE ORAL at 08:11

## 2022-11-22 NOTE — CASE MANAGEMENT
Case Management Progress Note    Patient name Denice Gordon  MAIN [facility] 2 Mary [facility]* MRN [id number]  : 1975 Date 2022       LOS (days): 0  Geometric Mean LOS (GMLOS) (days):   Days to GMLOS:        OBJECTIVE:        Current admission status: Observation  Preferred Pharmacy:   [address] [place] [id number]  Phone: [phone] Fax: [phone]    Primary Care Provider: Raymond De La Cruz DO    Primary Insurance: Glenny Malling SHIELD  Secondary Insurance: [place] [employer]    PROGRESS NOTE:  CM met with pt at bedside  Pt reported having transport home upon d/c  No needs identified at this time

## 2022-11-22 NOTE — ASSESSMENT & PLAN NOTE
Patient reports abdominal bloating, poor oral intake, nausea, vomiting  · He has been intermittently noticing the symptoms since his bowel obstruction  He reports that they are consistently a few days following Ozempic    · Imaging on admission unremarkable  · Continue supportive care  · Recommend discontinuing Ozempic  · Now improved

## 2022-11-22 NOTE — H&P
[facility]  H&P- Radha Neal 1975, 41 y o  male MRN: [id number]  Unit/Bed#: [address] 2 -01 Encounter: [id number]  Primary Care Provider: Efraín MERINO DO   Date and time admitted to hospital: 11/21/2022  2:33 PM    * Syncope  Assessment & Plan  Likely vasovagal syncope, possible component of orthostasis due to poor oral intake  EKG unremarkable  Will monitor on telemetry overnight  Continue IV fluids    Hypotension  Assessment & Plan  Likely related to poor oral intake  Fluid responsive in emergency department  No signs of acute infectious process  Continue IV fluids  If persistent, obtain echocardiogram    Abdominal pain  Assessment & Plan  Patient reports abdominal bloating, poor oral intake, nausea, vomiting  He has been intermittently noticing the symptoms since his bowel obstruction  He reports that they are consistently a few days following Ozempic  Imaging on admission unremarkable  Continue supportive care  Recommend discontinuing Ozempic    Obstructive sleep apnea  Assessment & Plan  CPAP q h s  Anxiety  Assessment & Plan  Continue home meds    Type 2 diabetes mellitus without complication, without long-term current use of insulin (HCC)  Assessment & Plan  Lab Results   Component Value Date    HGBA1C 5 5 09/29/2022       Recent Labs     11/21/22  1442   POCGLU 94       Blood Sugar Average: Last 72 hrs:  (P) 94     Hold metformin  Recommending discontinuing Ozempic due to adverse side effects  Sliding scale insulin while inpatient      VTE Prophylaxis:  Heparin  Code Status:  Level 1 full code    Anticipated Length of Stay:  Patient will be admitted on an Observation basis with an anticipated length of stay of less than 2 midnights  Justification for Hospital Stay:  Cardiac monitoring for syncopal event, IV fluids for hypotension    Total Time for Visit, including Counseling / Coordination of Care: 60 minutes    Greater than 50% of this total time spent on direct patient counseling and coordination of care  Chief Complaint:   Syncope    History of Present Illness:    Kiana Campos is a 41 y o  male With past medical history of non-insulin-dependent type 2 diabetes, hyperlipidemia, YURIY on CPAP, recent SBO status post lysis of adhesions 8/16 who presents the hospital with abdominal pain  Patient was seen in the emergency department earlier today, workup was unremarkable patient was discharged home with conservative management  In the waiting room on his way out he went to use the bathroom  He reports he felt dizzy and diaphoretic and had a syncopal episode  Patient was responsive within a minute with no deficits  Patient reports 1 prior syncopal event prior to this in the setting of his small-bowel obstruction  He reports he is having this abdominal pain and GI symptoms consistently following Ozempic  Review of Systems:    Review of Systems   Constitutional: Negative for chills and fever  HENT: Negative for sore throat and trouble swallowing  Eyes: Negative for photophobia and visual disturbance  Respiratory: Negative for shortness of breath and wheezing  Cardiovascular: Negative for chest pain and palpitations  Gastrointestinal: Positive for abdominal distention, abdominal pain, diarrhea, nausea and vomiting  Negative for constipation  Genitourinary: Negative for difficulty urinating and dysuria  Musculoskeletal: Negative for arthralgias and myalgias  Skin: Negative for rash and wound  Neurological: Positive for syncope and light-headedness  Negative for dizziness and headaches         Past Medical and Surgical History:     Past Medical History:   Diagnosis Date   • Diabetes ([address] )    • Hyperlipidemia    • YURIY on CPAP        Past Surgical History:   Procedure Laterality Date   • [place] LAP,DIAGNOSTIC ABDOMEN N/A 8/16/2022    Procedure: LAPAROSCOPY DIAGNOSTIC, LYSIS OF ADHESIONS, REDUCTION OF INTERNAL HERNIA;  Surgeon: Katarina Rivera MD; Location: [facility] OR;  Service: General   • VASECTOMY         Meds/Allergies:    Prior to Admission medications    Medication Sig Start Date End Date Taking?  Authorizing Provider   atorvastatin (LIPITOR) 20 mg tablet Take 1 tablet (20 mg total) by mouth daily 11/1/22  Yes Raymond Ruano DO   metFORMIN (GLUCOPHAGE) 1000 MG tablet Take 1,000 mg by mouth 2 (two) times a day 6/13/22  Yes Historical Provider, MD   semaglutide, 1 mg/dose, (Ozempic, 1 MG/DOSE,) 4 MG/3ML SOPN injection pen Inject 0 75 mL (1 mg total) under the skin once a week 10/21/22  Yes MADIHA Sow   acetaminophen (TYLENOL) 325 mg tablet Take 2 tablets (650 mg total) by mouth every 6 (six) hours as needed for mild pain  Patient not taking: Reported on 10/21/2022 8/17/22   Wilbetr Isabel PA-C   venlafaxine [facility] ) 37 5 mg 24 hr capsule Take 37 5 mg by mouth daily  Patient not taking: Reported on 11/21/2022 11/26/21   Historical Provider, MD       Allergies: No Known Allergies    Social History:     Marital Status: /Civil Union   Substance Use History:   Social History     Substance and Sexual Activity   Alcohol Use Never     Social History     Tobacco Use   Smoking Status Former   • Packs/day: 3 00   • Types: Cigarettes   Smokeless Tobacco Never   Tobacco Comments    8 YRS AGO     Social History     Substance and Sexual Activity   Drug Use Never       Family History:    Pertinent family history reviewed    Physical Exam:     Vitals:   Blood Pressure: 105/65 (11/21/22 2041)  Pulse: 84 (11/21/22 2041)  Temperature: 98 7 °F (37 1 °C) (11/21/22 2041)  Temp Source: Oral (11/21/22 1428)  Respirations: 18 (11/21/22 2041)  SpO2: 98 % (11/21/22 2041)    Physical Exam     Additional Data:     Lab Results: I have reviewed pertinent results     Results from last 7 days   Lab Units 11/21/22  1447   WBC Thousand/uL 12 96*   HEMOGLOBIN g/dL 15 3   HEMATOCRIT % 44 5   PLATELETS Thousands/uL 311   NEUTROS PCT % 70   LYMPHS PCT % 19   MONOS PCT % 7 EOS PCT % 4     Results from last 7 days   Lab Units 11/21/22  1447   SODIUM mmol/L 140   POTASSIUM mmol/L 3 6   CHLORIDE mmol/L 103   CO2 mmol/L 27   BUN mg/dL 16   CREATININE mg/dL 1 02   ANION GAP mmol/L 10   CALCIUM mg/dL 8 5   ALBUMIN g/dL 3 4*   TOTAL BILIRUBIN mg/dL 0 63   ALK PHOS U/L 152*   ALT U/L 24   AST U/L 18   GLUCOSE RANDOM mg/dL 157*         Results from last 7 days   Lab Units 11/21/22  1442   POC GLUCOSE mg/dl 94         Results from last 7 days   Lab Units 11/21/22  1027   LACTIC ACID mmol/L 1 3       Imaging: I have reviewed pertinent imaging     No orders to display       EKG, Pathology, and Other Studies Reviewed on Admission:   · EKG: Reviewed     Allscripts / Epic Records Reviewed    ** Please Note: This note has been constructed using a voice recognition system   **

## 2022-11-22 NOTE — PLAN OF CARE
Problem: INFECTION - ADULT  Goal: Absence or prevention of progression during hospitalization  Description: INTERVENTIONS:  - Assess and monitor for signs and symptoms of infection  - Monitor lab/diagnostic results  - Monitor all insertion sites, i e  indwelling lines, tubes, and drains  - Monitor endotracheal if appropriate and nasal secretions for changes in amount and color  - [place] appropriate cooling/warming therapies per order  - Administer medications as ordered  - Instruct and encourage patient and family to use good hand hygiene technique  - Identify and instruct in appropriate isolation precautions for identified infection/condition  Outcome: Progressing     Problem: SAFETY ADULT  Goal: Patient will remain free of falls  Description: INTERVENTIONS:  - Educate patient/family on patient safety including physical limitations  - Instruct patient to call for assistance with activity   - Consult OT/PT to assist with strengthening/mobility   - Keep Call bell within reach  - Keep bed low and locked with side rails adjusted as appropriate  - Keep care items and personal belongings within reach  - Initiate and maintain comfort rounds  - Make Fall Risk Sign visible to staff  - Offer Toileting every  Hours, in advance of need  - Initiate/Maintain alarm  - Obtain necessary fall risk management equipment:   - Apply yellow socks and bracelet for high fall risk patients  - Consider moving patient to room near nurses station  Outcome: Progressing  Goal: Maintain or return to baseline ADL function  Description: INTERVENTIONS:  -  Assess patient's ability to carry out ADLs; assess patient's baseline for ADL function and identify physical deficits which impact ability to perform ADLs (bathing, care of mouth/teeth, toileting, grooming, dressing, etc )  - Assess/evaluate cause of self-care deficits   - Assess range of motion  - Assess patient's mobility; develop plan if impaired  - Assess patient's need for assistive devices and provide as appropriate  - Encourage maximum independence but intervene and supervise when necessary  - Involve family in performance of ADLs  - Assess for home care needs following discharge   - Consider OT consult to assist with ADL evaluation and planning for discharge  - Provide patient education as appropriate  Outcome: Progressing     Problem: DISCHARGE PLANNING  Goal: Discharge to home or other facility with appropriate resources  Description: INTERVENTIONS:  - Identify barriers to discharge w/patient and caregiver  - Arrange for needed discharge resources and transportation as appropriate  - Identify discharge learning needs (meds, wound care, etc )  - Arrange for interpretive services to assist at discharge as needed  - Refer to Case Management Department for coordinating discharge planning if the patient needs post-hospital services based on physician/advanced practitioner order or complex needs related to functional status, cognitive ability, or social support system  Outcome: Progressing     Problem: Knowledge Deficit  Goal: Patient/family/caregiver demonstrates understanding of disease process, treatment plan, medications, and discharge instructions  Description: Complete learning assessment and assess knowledge base    Interventions:  - Provide teaching at level of understanding  - Provide teaching via preferred learning methods  Outcome: Progressing     Problem: CARDIOVASCULAR - ADULT  Goal: Maintains optimal cardiac output and hemodynamic stability  Description: INTERVENTIONS:  - Monitor I/O, vital signs and rhythm  - Monitor for S/S and trends of decreased cardiac output  - Administer and titrate ordered vasoactive medications to optimize hemodynamic stability  - Assess quality of pulses, skin color and temperature  - Assess for signs of decreased coronary artery perfusion  - Instruct patient to report change in severity of symptoms  Outcome: Progressing  Goal: Absence of cardiac dysrhythmias or at baseline rhythm  Description: INTERVENTIONS:  - Continuous cardiac monitoring, vital signs, obtain 12 lead EKG if ordered  - Administer antiarrhythmic and heart rate control medications as ordered  - Monitor electrolytes and administer replacement therapy as ordered  Outcome: Progressing

## 2022-11-22 NOTE — ASSESSMENT & PLAN NOTE
Lab Results   Component Value Date    HGBA1C 5 5 09/29/2022       Recent Labs     11/21/22  1442 11/22/22  0806 11/22/22  1134   POCGLU 94 85 112       Blood Sugar Average: Last 72 hrs:  (P) 97     Metformin held while inpatient, recommend discontinuing Ozempic

## 2022-11-22 NOTE — ASSESSMENT & PLAN NOTE
Likely related to poor oral intake  Fluid responsive in emergency department  No signs of acute infectious process  Continue IV fluids  If persistent, obtain echocardiogram

## 2022-11-22 NOTE — ASSESSMENT & PLAN NOTE
Lab Results   Component Value Date    HGBA1C 5 5 09/29/2022       Recent Labs     11/21/22  1442   POCGLU 94       Blood Sugar Average: Last 72 hrs:  (P) 94     Hold metformin  Recommending discontinuing Ozempic due to adverse side effects  Sliding scale insulin while inpatient

## 2022-11-22 NOTE — ASSESSMENT & PLAN NOTE
Likely vasovagal syncope, possible component of orthostasis due to poor oral intake  EKG unremarkable  Will monitor on telemetry overnight  Continue IV fluids

## 2022-11-22 NOTE — ASSESSMENT & PLAN NOTE
Patient reports abdominal bloating, poor oral intake, nausea, vomiting  He has been intermittently noticing the symptoms since his bowel obstruction  He reports that they are consistently a few days following Ozempic    Imaging on admission unremarkable  Continue supportive care  Recommend discontinuing Ozempic

## 2022-11-22 NOTE — ASSESSMENT & PLAN NOTE
Likely vasovagal syncope, possible component of orthostasis due to poor oral intake  · EKG unremarkable  · Without any acute events on telemetry  · With improving blood pressures after administration of IV fluids  · Encourage p o   Hydration  · Recommend discontinuing Ozempic

## 2022-11-22 NOTE — DISCHARGE INSTR - AVS FIRST PAGE
Feliciano Galindo were admitted to the hospital after passing out due to low blood pressure  It is likely that you were extremely dehydrated, I suspect that this is due to Ozempic use  At this time, I recommend stopping Ozempic until evaluated by your outpatient prescriber  I encourage you to continue to drink plenty of fluids  Please return to the hospital if you begin to experience any symptoms of shortness of breath, chest pain, lightheadedness/dizziness, passing out, or any other concerning symptoms  Please follow-up with your primary care provider within the next week      Alina Broderick PA-C

## 2022-11-22 NOTE — DISCHARGE SUMMARY
[address] [facility]  Discharge- Kim Guzman 1975, 41 y o  male MRN: [id number]  Unit/Bed#: [facility] 2 -01 Encounter: [id number]  Primary Care Provider: Tanya ADEN, DO   Date and time admitted to hospital: 11/21/2022  2:33 PM    * Syncope  Assessment & Plan  Likely vasovagal syncope, possible component of orthostasis due to poor oral intake  · EKG unremarkable  · Without any acute events on telemetry  · With improving blood pressures after administration of IV fluids  · Encourage p o  Hydration  · Recommend discontinuing Ozempic    Hypotension  Assessment & Plan  With significant hypotension in the emergency department, fluid responsive  · Likely related to poor oral intake due to Ozempic use  · No signs of acute infectious process  · UA negative, CT abdomen pelvis negative for any acute pathology  · Procalcitonin negative, without lactic acidosis  · Now improved  · recommend d/c ozempic    SBO (small bowel obstruction) ([facility] [address] )  Assessment & Plan  A history of closed loop internal hernia with recent admission repair in August 2022    Obstructive sleep apnea  Assessment & Plan  CPAP q h s  Anxiety  Assessment & Plan  Continue home meds    Type 2 diabetes mellitus without complication, without long-term current use of insulin [facility])  Assessment & Plan  Lab Results   Component Value Date    HGBA1C 5 5 09/29/2022       Recent Labs     11/21/22  1442 11/22/22  0806 11/22/22  1134   POCGLU 94 85 112       Blood Sugar Average: Last 72 hrs:  (P) 97     Metformin held while inpatient, recommend discontinuing Ozempic    Abdominal pain-resolved as of 11/22/2022  Assessment & Plan  Patient reports abdominal bloating, poor oral intake, nausea, vomiting  · He has been intermittently noticing the symptoms since his bowel obstruction  He reports that they are consistently a few days following Ozempic    · Imaging on admission unremarkable  · Continue supportive care  · Recommend discontinuing Ozempic  · Now improved      Medical Problems     Resolved Problems  Date Reviewed: 11/22/2022          Resolved    Abdominal pain 11/22/2022     Resolved by  Roslyn Arizmendi PA-C        Discharging Physician / Practitioner: Roslyn Arizmendi PA-C  PCP: Serenity PHIPPS DO  Admission Date:   Admission Orders (From admission, onward)     Ordered        11/21/22 1933  Place in Observation  Once                      Discharge Date: 11/22/22    Consultations During Hospital Stay:  none    Procedures Performed:   · none    Significant Findings / Test Results:   CT ABDOMEN AND PELVIS WITH IV CONTRAST     INDICATION:   abdominal pain      COMPARISON:  9/29/2022     TECHNIQUE:  CT examination of the abdomen and pelvis was performed  Axial, sagittal, and coronal 2D reformatted images were created from the source data and submitted for interpretation      Radiation dose length product (DLP) for this visit:  717 mGy-cm   This examination, like all CT scans performed in the [facility], was performed utilizing techniques to minimize radiation dose exposure, including the use of iterative   reconstruction and automated exposure control      IV Contrast:  100 mL of iohexol (OMNIPAQUE)  Enteric Contrast:  Enteric contrast was not administered      FINDINGS:     ABDOMEN     LOWER CHEST:  Previous lymph node adjacent to the distal esophagus now measures 1 cm, slightly smaller than prior      LIVER/BILIARY TREE:  Unremarkable      GALLBLADDER:  No calcified gallstones  No pericholecystic inflammatory change      SPLEEN:  Unremarkable      PANCREAS:  Unremarkable      ADRENAL GLANDS:  Unremarkable      KIDNEYS/URETERS:  3 mm bilateral renal calculi  No hydronephrosis  Subcentimeter left renal hypodensity      STOMACH AND BOWEL:  Unremarkable      APPENDIX:  A normal appendix was visualized      ABDOMINOPELVIC CAVITY:  No ascites  No pneumoperitoneum    No lymphadenopathy      VESSELS:  Unremarkable for patient's age      PELVIS     REPRODUCTIVE ORGANS:  Unremarkable for patient's age      URINARY BLADDER:  Unremarkable      ABDOMINAL WALL/INGUINAL REGIONS:  Unremarkable      OSSEOUS STRUCTURES:  No acute fracture or destructive osseous lesion      IMPRESSION:     Nonobstructing bilateral renal calculi  No hydronephrosis  Incidental Findings:   · none     Test Results Pending at Discharge (will require follow up):   · none     Outpatient Tests Requested:  · none    Complications:  none    Reason for Admission: syncope, hypotension    Hospital Course:   Jesus Kowalski is a 41 y o  male patient with past medical history of diabetes who originally presented to the hospital on 11/21/2022 due to syncope  The patient initially presented to the emergency department earlier on the day of admission and workup was unremarkable  At that time, he was being seen for abdominal pain  While leaving the emergency department, he went to use the bathroom and had an episode of dizziness and syncope  He was then re-evaluated in the emergency department and he was found to be significantly hypotensive  He was administered IV fluids with improvement in his blood pressures  Overnight, he was continued on aggressive IV hydration and his blood pressure improved  He denies any episodes of dizziness or lightheadedness during his hospital stay  He was monitored on telemetry without any acute events  He was also worked up for any infectious etiology which was negative  It is recommended that he discontinue his Ozempic due to continued side effects  The patient was discharged in stable condition on 11/22/2022  Please see above list of diagnoses and related plan for additional information  Condition at Discharge: stable    Discharge Day Visit / Exam:   Subjective: The patient is seen resting in bed  He states that he has not had any additional episodes of lightheadedness or dizziness    He states that he was up ambulating in the room without any symptoms  He is feeling well  He is looking forward to going home  Vitals: Blood Pressure: 101/50 (11/22/22 1135)  Pulse: 76 (11/22/22 1135)  Temperature: 97 9 °F (36 6 °C) (11/22/22 1135)  Temp Source: Oral (11/21/22 2315)  Respirations: 16 (11/22/22 1135)  Height: 5' 9" (175 3 cm) (11/21/22 2041)  Weight - Scale: 104 kg (230 lb) (11/21/22 2041)  SpO2: 96 % (11/22/22 1135)  Exam:   Physical Exam  Vitals and nursing note reviewed  Constitutional:       General: He is not in acute distress  Appearance: Normal appearance  He is not ill-appearing, toxic-appearing or diaphoretic  HENT:      Head: Normocephalic and atraumatic  Cardiovascular:      Rate and Rhythm: Normal rate and regular rhythm  Heart sounds: No murmur heard  No friction rub  No gallop  Pulmonary:      Effort: Pulmonary effort is normal  No respiratory distress  Breath sounds: Normal breath sounds  No wheezing, rhonchi or rales  Abdominal:      General: Abdomen is flat  Bowel sounds are normal  There is no distension  Palpations: Abdomen is soft  Tenderness: There is no abdominal tenderness  Musculoskeletal:      Right lower leg: No edema  Left lower leg: No edema  Skin:     General: Skin is warm and dry  Coloration: Skin is not jaundiced or pale  Neurological:      General: No focal deficit present  Mental Status: He is alert  Mental status is at baseline  Discussion with Family: Patient declined call to   Discharge instructions/Information to patient and family:   See after visit summary for information provided to patient and family  Provisions for Follow-Up Care:  See after visit summary for information related to follow-up care and any pertinent home health orders  Disposition:   Home    Planned Readmission: n/a     Discharge Statement:  I spent 32 minutes discharging the patient  This time was spent on the day of discharge   I had direct contact with the patient on the day of discharge  Greater than 50% of the total time was spent examining patient, answering all patient questions, arranging and discussing plan of care with patient as well as directly providing post-discharge instructions  Additional time then spent on discharge activities  Discharge Medications:  See after visit summary for reconciled discharge medications provided to patient and/or family        **Please Note: This note may have been constructed using a voice recognition system**

## 2022-11-22 NOTE — PLAN OF CARE
Problem: INFECTION - ADULT  Goal: Absence or prevention of progression during hospitalization  Description: INTERVENTIONS:  - Assess and monitor for signs and symptoms of infection  - Monitor lab/diagnostic results  - Monitor all insertion sites, i e  indwelling lines, tubes, and drains  - Monitor endotracheal if appropriate and nasal secretions for changes in amount and color  - [place] appropriate cooling/warming therapies per order  - Administer medications as ordered  - Instruct and encourage patient and family to use good hand hygiene technique  - Identify and instruct in appropriate isolation precautions for identified infection/condition  Outcome: Progressing     Problem: SAFETY ADULT  Goal: Patient will remain free of falls  Description: INTERVENTIONS:  - Educate patient/family on patient safety including physical limitations  - Instruct patient to call for assistance with activity   - Consult OT/PT to assist with strengthening/mobility   - Keep Call bell within reach  - Keep bed low and locked with side rails adjusted as appropriate  - Keep care items and personal belongings within reach  - Initiate and maintain comfort rounds  - Make Fall Risk Sign visible to staff  - Apply yellow socks and bracelet for high fall risk patients  - Consider moving patient to room near nurses station  Outcome: Progressing  Goal: Maintain or return to baseline ADL function  Description: INTERVENTIONS:  -  Assess patient's ability to carry out ADLs; assess patient's baseline for ADL function and identify physical deficits which impact ability to perform ADLs (bathing, care of mouth/teeth, toileting, grooming, dressing, etc )  - Assess/evaluate cause of self-care deficits   - Assess range of motion  - Assess patient's mobility; develop plan if impaired  - Assess patient's need for assistive devices and provide as appropriate  - Encourage maximum independence but intervene and supervise when necessary  - Involve family in performance of ADLs  - Assess for home care needs following discharge   - Consider OT consult to assist with ADL evaluation and planning for discharge  - Provide patient education as appropriate  Outcome: Progressing     Problem: DISCHARGE PLANNING  Goal: Discharge to home or other facility with appropriate resources  Description: INTERVENTIONS:  - Identify barriers to discharge w/patient and caregiver  - Arrange for needed discharge resources and transportation as appropriate  - Identify discharge learning needs (meds, wound care, etc )  - Arrange for interpretive services to assist at discharge as needed  - Refer to Case Management Department for coordinating discharge planning if the patient needs post-hospital services based on physician/advanced practitioner order or complex needs related to functional status, cognitive ability, or social support system  Outcome: Progressing     Problem: Knowledge Deficit  Goal: Patient/family/caregiver demonstrates understanding of disease process, treatment plan, medications, and discharge instructions  Description: Complete learning assessment and assess knowledge base    Interventions:  - Provide teaching at level of understanding  - Provide teaching via preferred learning methods  Outcome: Progressing     Problem: CARDIOVASCULAR - ADULT  Goal: Maintains optimal cardiac output and hemodynamic stability  Description: INTERVENTIONS:  - Monitor I/O, vital signs and rhythm  - Monitor for S/S and trends of decreased cardiac output  - Administer and titrate ordered vasoactive medications to optimize hemodynamic stability  - Assess quality of pulses, skin color and temperature  - Assess for signs of decreased coronary artery perfusion  - Instruct patient to report change in severity of symptoms  Outcome: Progressing  Goal: Absence of cardiac dysrhythmias or at baseline rhythm  Description: INTERVENTIONS:  - Continuous cardiac monitoring, vital signs, obtain 12 lead EKG if ordered  - Administer antiarrhythmic and heart rate control medications as ordered  - Monitor electrolytes and administer replacement therapy as ordered  Outcome: Progressing

## 2022-11-22 NOTE — ED PROVIDER NOTES
History  Chief Complaint   Patient presents with   • Nausea     Pt reports he was just here and was given medication, now feeling nauseous  Pt found laying on waiting room floor  Patient was just here in the ED for abdominal pain  He had a work up that was unremarkable and was discharged after reglan and benadryl give for nausea  He then left the ED but had not yet left the building when he felt lightheaded and weak  He was feeling like he was going to pass out and laid on the floor  He was then brought back into the ED and was found to be pale, weak and hypotensive  Prior to Admission Medications   Prescriptions Last Dose Informant Patient Reported? Taking?   acetaminophen (TYLENOL) 325 mg tablet   No No   Sig: Take 2 tablets (650 mg total) by mouth every 6 (six) hours as needed for mild pain   Patient not taking: Reported on 10/21/2022   atorvastatin (LIPITOR) 20 mg tablet   No No   Sig: Take 1 tablet (20 mg total) by mouth daily   metFORMIN (GLUCOPHAGE) 1000 MG tablet   Yes No   Sig: Take 1,000 mg by mouth 2 (two) times a day   semaglutide, 1 mg/dose, (Ozempic, 1 MG/DOSE,) 4 MG/3ML SOPN injection pen   No No   Sig: Inject 0 75 mL (1 mg total) under the skin once a week   venlafaxine (EFFEXOR-XR) 37 5 mg 24 hr capsule   Yes No   Sig: Take 37 5 mg by mouth daily      Facility-Administered Medications: None       Past Medical History:   Diagnosis Date   • Diabetes ([facility] [address] )    • Hyperlipidemia    • YURIY on CPAP        Past Surgical History:   Procedure Laterality Date   • [place] LAP,DIAGNOSTIC ABDOMEN N/A 8/16/2022    Procedure: LAPAROSCOPY DIAGNOSTIC, LYSIS OF ADHESIONS, REDUCTION OF INTERNAL HERNIA;  Surgeon: Mary Dawson MD;  Location: [facility];  Service: General   • VASECTOMY         Family History   Family history unknown: Yes     I have reviewed and agree with the history as documented      E-Cigarette/Vaping   • E-Cigarette Use Never User      E-Cigarette/Vaping Substances     Social History     Tobacco Use   • Smoking status: Former     Packs/day: 3 00     Types: Cigarettes   • Smokeless tobacco: Never   • Tobacco comments:     8 YRS AGO   Vaping Use   • Vaping Use: Never used   Substance Use Topics   • Alcohol use: Never   • Drug use: Never       Review of Systems   Constitutional: Positive for appetite change and fatigue  Negative for chills and fever  HENT: Negative for ear pain and sore throat  Eyes: Negative for pain and visual disturbance  Respiratory: Negative for cough and shortness of breath  Cardiovascular: Negative for chest pain and palpitations  Gastrointestinal: Positive for abdominal pain, diarrhea and nausea  Negative for vomiting  Genitourinary: Negative for dysuria and hematuria  Musculoskeletal: Negative for arthralgias and back pain  Skin: Negative for color change and rash  Neurological: Positive for dizziness, weakness and light-headedness  Negative for seizures, syncope and facial asymmetry  All other systems reviewed and are negative        Physical Exam  Physical Exam    Vital Signs  ED Triage Vitals   Temperature Pulse Respirations Blood Pressure SpO2   11/21/22 1428 11/21/22 1430 11/21/22 1430 11/21/22 1430 11/21/22 1430   97 6 °F (36 4 °C) 73 18 (!) 57/21 97 %      Temp Source Heart Rate Source Patient Position - Orthostatic VS BP Location FiO2 (%)   11/21/22 1428 11/21/22 1430 11/21/22 1430 11/21/22 1430 --   Oral Monitor Sitting Left arm       Pain Score       11/21/22 1531       8           Vitals:    11/21/22 1652 11/21/22 1700 11/21/22 1721 11/21/22 1724   BP: (!) 83/47 92/50 92/50 102/57   Pulse: 76 78  88   Patient Position - Orthostatic VS: Lying Lying  Lying         Visual Acuity      ED Medications  Medications   sodium chloride 0 9 % bolus 1,000 mL (0 mL Intravenous Stopped 11/21/22 1530)   sodium chloride 0 9 % bolus 1,000 mL (0 mL Intravenous Stopped 11/21/22 1715)       Diagnostic Studies  Results Reviewed     Procedure Component Value Units Date/Time    HS Troponin I 2hr [[id number]] Collected: 11/21/22 1652    Lab Status: Final result Specimen: Blood from Arm, Left Updated: 11/21/22 1722     hs TnI 2hr <2 ng/L      Delta 2hr hsTnI --    HS Troponin 0hr (reflex protocol) [[id number]]  (Normal) Collected: 11/21/22 1447    Lab Status: Final result Specimen: Blood from Arm, Left Updated: 11/21/22 1524     hs TnI 0hr <2 ng/L     Comprehensive metabolic panel [[id number]]  (Abnormal) Collected: 11/21/22 1447    Lab Status: Final result Specimen: Blood from Arm, Left Updated: 11/21/22 1518     Sodium 140 mmol/L      Potassium 3 6 mmol/L      Chloride 103 mmol/L      CO2 27 mmol/L      ANION GAP 10 mmol/L      BUN 16 mg/dL      Creatinine 1 02 mg/dL      Glucose 157 mg/dL      Calcium 8 5 mg/dL      Corrected Calcium 9 0 mg/dL      AST 18 U/L      ALT 24 U/L      Alkaline Phosphatase 152 U/L      Total Protein 6 9 g/dL      Albumin 3 4 g/dL      Total Bilirubin 0 63 mg/dL      eGFR 88 ml/min/1 73sq m     Narrative:      [place] guidelines for Chronic Kidney Disease (CKD):   •  Stage 1 with normal or high GFR (GFR > 90 mL/min/1 73 square meters)  •  Stage 2 Mild CKD (GFR = 60-89 mL/min/1 73 square meters)  •  Stage 3A Moderate CKD (GFR = 45-59 mL/min/1 73 square meters)  •  Stage 3B Moderate CKD (GFR = 30-44 mL/min/1 73 square meters)  •  Stage 4 Severe CKD (GFR = 15-29 mL/min/1 73 square meters)  •  Stage 5 End Stage CKD (GFR <15 mL/min/1 73 square meters)  Note: GFR calculation is accurate only with a steady state creatinine    CBC and differential [[id number]]  (Abnormal) Collected: 11/21/22 1447    Lab Status: Final result Specimen: Blood from Arm, Left Updated: 11/21/22 1456     WBC 12 96 Thousand/uL      RBC 5 28 Million/uL      Hemoglobin 15 3 g/dL      Hematocrit 44 5 %      MCV 84 fL      MCH 29 0 pg      MCHC 34 4 g/dL      RDW 12 8 %      MPV 9 5 fL      Platelets 864 Thousands/uL      nRBC 0 /100 WBCs Neutrophils Relative 70 %      Immat GRANS % 0 %      Lymphocytes Relative 19 %      Monocytes Relative 7 %      Eosinophils Relative 4 %      Basophils Relative 0 %      Neutrophils Absolute 9 09 Thousands/µL      Immature Grans Absolute 0 03 Thousand/uL      Lymphocytes Absolute 2 45 Thousands/µL      Monocytes Absolute 0 90 Thousand/µL      Eosinophils Absolute 0 47 Thousand/µL      Basophils Absolute 0 02 Thousands/µL     Fingerstick Glucose (POCT) [[id number]]  (Normal) Collected: 11/21/22 1442    Lab Status: Final result Updated: 11/21/22 1452     POC Glucose 94 mg/dl                  No orders to display              Procedures  Procedures         ED Course     Patient has been worked up in the ED and given fluid resuscitation  His blood pressure has improved but is not completely back at baseline  He did attempt to tolerate po but has not been able to get much down  He is still not feeling well  SBIRT 20yo+    Flowsheet Row Most Recent Value   SBIRT (23 yo +)    In order to provide better care to our patients, we are screening all of our patients for alcohol and drug use  Would it be okay to ask you these screening questions? Unable to answer at this time Filed at: 11/21/2022 1536                    MDM    Disposition  Final diagnoses:   Nausea   Hypotension   Near syncope     Time reflects when diagnosis was documented in both MDM as applicable and the Disposition within this note     Time User Action Codes Description Comment    11/21/2022  7:18 PM Lavenia Duel L Add [R11 0] Nausea     11/21/2022  7:18 PM Lavenia Duel L Add [I95 9] Hypotension     11/21/2022  7:18 PM Lavenia Duel Add [R55] Near syncope       ED Disposition     None      Follow-up Information    None         Patient's Medications   Discharge Prescriptions    No medications on file       No discharge procedures on file      PDMP Review       Value Time User    PDMP Reviewed Yes 8/17/2022  7:41 AM Joe Camacho PA-C          ED Provider  Electronically Signed by           Yuliya Martinez DO  11/21/22 1933

## 2022-11-22 NOTE — UTILIZATION REVIEW
Initial Clinical Review    Admission: Date/Time/Statement:   Admission Orders (From admission, onward)     Ordered        11/21/22 1933  Place in Observation  Once                      Orders Placed This Encounter   Procedures   • Place in Observation     Standing Status:   Standing     Number of Occurrences:   1     Order Specific Question:   Level of Care     Answer:   Med Surg [16]     ED Arrival Information     Expected   -    Arrival   11/21/2022 14:21    Acuity   Emergent            Means of arrival   Wheelchair    Escorted by   Family Member    Service   Hospitalist    Admission type   Emergency            Arrival complaint   dizzy           Chief Complaint   Patient presents with   • Nausea     Pt reports he was just here and was given medication, now feeling nauseous  Pt found laying on waiting room floor  Initial Presentation: 41 y o  male presents to the ED from home with c/o abd pain and GI Symptoms since starting on Ozempic  Had been seen in ED earlier in day with unremarkable work up and d/c to home  In the ED BR on the way out he had dizziness, diaphoresis and had syncopal episode, awake within 1 minute w/o deficits  PMH; NIDDM, HLD, YURIY on CPAP, recent SBP w/ BELA  In the ED he is hypotensive  He is treated with IV fluids  Labs show leukocytosis, no troponin elevation and imaging shows Nonobstructing bilateral renal calculi  No hydronephrosis  On exam he has no deficits  He is admitted to OBSERVATION status with Syncope - like vasovagal - Tele  Hypotension - IV fluids, if persists get Echo  Abd pain - should d/c Ozempic  Date: 11/22:  Continues with intermittent hypotension today  Good glucose control       ED Triage Vitals   Temperature Pulse Respirations Blood Pressure SpO2   11/21/22 1428 11/21/22 1430 11/21/22 1430 11/21/22 1430 11/21/22 1430   97 6 °F (36 4 °C) 73 18 (!) 57/21 97 %      Temp Source Heart Rate Source Patient Position - Orthostatic VS BP Location FiO2 (%) 11/21/22 1428 11/21/22 1430 11/21/22 1430 11/21/22 1430 --   Oral Monitor Sitting Left arm       Pain Score       11/21/22 1531       8          Wt Readings from Last 1 Encounters:   11/21/22 104 kg (230 lb)     Additional Vital Signs:   11/22/22 08:06:08 98 2 °F (36 8 °C) 94 -- 97/67 77 97 % -- -- --   11/22/22 08:03:59 98 2 °F (36 8 °C) 83 18 86/52 Abnormal  63 Abnormal  96 % -- -- --   11/22/22 05:28:01 -- 98 -- 123/69 87 96 % -- -- --   11/22/22 0400 -- -- -- -- -- -- -- Full face mask --   11/22/22 02:45:32 98 4 °F (36 9 °C) 84 20 90/47 Abnormal  61 Abnormal  95 % -- -- --   11/22/22 0023 -- -- -- -- -- 95 % -- Full face mask --   11/21/22 23:15:34 98 4 °F (36 9 °C) 90 20 89/47 Abnormal  61 Abnormal  95 % None (Room air) -- Lying   11/21/22 20:41:35 98 7 °F (37 1 °C) 84 18 105/65 78 98 % -- -- --   11/21/22 2003 -- 103 16 87/55 Abnormal  -- 98 % None (Room air) -- Lying   11/21/22 1724 -- 88 16 102/57 -- 100 % None (Room air) -- Lying   11/21/22 1721 -- -- -- 92/50 -- -- -- -- --   11/21/22 1700 -- 78 16 92/50 -- 100 % None (Room air) -- Lying   11/21/22 1652 -- 76 16 83/47 Abnormal  -- 99 % None (Room air) -- Lying   11/21/22 1531 -- 66 16 89/48 Abnormal  -- 97 % None (Room air) -- Lying   11/21/22 1506 -- -- -- 87/48 Abnormal  -- -- -- -- --   11/21/22 1455 -- 66 16 73/42 Abnormal  -- 99 % None (Room air) -- Lying       Pertinent Labs/Diagnostic Test Results:     11/21 CTAP - Nonobstructing bilateral renal calculi  No hydronephrosis      11/21 ECG - Normal sinus rhythm    Results from last 7 days   Lab Units 11/22/22  0514   SARS-COV-2  Negative     Results from last 7 days   Lab Units 11/22/22 0522 11/21/22  1447 11/21/22  1027   WBC Thousand/uL 10 11 12 96* 10 23*   HEMOGLOBIN g/dL 13 2 15 3 16 6   HEMATOCRIT % 38 8 44 5 47 2   PLATELETS Thousands/uL 262 311 303   NEUTROS ABS Thousands/µL  --  9 09* 7 69*         Results from last 7 days   Lab Units 11/22/22 0522 11/21/22  1447 11/21/22  1027   SODIUM mmol/L 142 140 141   POTASSIUM mmol/L 3 9 3 6 4 0   CHLORIDE mmol/L 109* 103 102   CO2 mmol/L 25 27 32   ANION GAP mmol/L 8 10 7   BUN mg/dL 13 16 17   CREATININE mg/dL 0 80 1 02 0 96   EGFR ml/min/1 73sq m 107 88 95   CALCIUM mg/dL 8 1* 8 5 8 8     Results from last 7 days   Lab Units 11/21/22  1447 11/21/22  1027   AST U/L 18  --    ALT U/L 24 26   ALK PHOS U/L 152* 152*   TOTAL PROTEIN g/dL 6 9 7 6   ALBUMIN g/dL 3 4* 3 6   TOTAL BILIRUBIN mg/dL 0 63 0 71     Results from last 7 days   Lab Units 11/22/22  0806 11/21/22  1442   POC GLUCOSE mg/dl 85 94     Results from last 7 days   Lab Units 11/22/22  0522 11/21/22  1447 11/21/22  1027   GLUCOSE RANDOM mg/dL 91 157* 118     Results from last 7 days   Lab Units 11/21/22  1652 11/21/22  1447   HS TNI 0HR ng/L  --  <2   HS TNI 2HR ng/L <2  --      Results from last 7 days   Lab Units 11/21/22  1027   LACTIC ACID mmol/L 1 3     Results from last 7 days   Lab Units 11/21/22  1027   LIPASE u/L 138       Results from last 7 days   Lab Units 11/22/22  0522   CLARITY UA  Clear   COLOR UA  Yellow   SPEC GRAV UA  1 020   PH UA  5 5   GLUCOSE UA mg/dl Negative   KETONES UA mg/dl Negative   BLOOD UA  Negative   PROTEIN UA mg/dl Negative   NITRITE UA  Negative   BILIRUBIN UA  Negative   UROBILINOGEN UA E U /dl 0 2   LEUKOCYTES UA  Negative     Results from last 7 days   Lab Units 11/22/22  0514   INFLUENZA A PCR  Negative   INFLUENZA B PCR  Negative   RSV PCR  Negative     ED Treatment:   Medication Administration from 11/21/2022 1421 to 11/21/2022 2030       Date/Time Order Dose Route Action     11/21/2022 1441 EST sodium chloride 0 9 % bolus 1,000 mL 1,000 mL Intravenous New Bag     11/21/2022 1615 EST sodium chloride 0 9 % bolus 1,000 mL 1,000 mL Intravenous New Bag        Past Medical History:   Diagnosis Date   • [facility]    • Hyperlipidemia    • YURIY on CPAP      Present on Admission:  • Type 2 diabetes mellitus without complication, without long-term current use of insulin ([facility])  • Anxiety  • Obstructive sleep apnea  • SBO (small bowel obstruction) ([facility])      Admitting Diagnosis: Dizziness [R42]  Nausea [R11 0]  Hypotension [I95 9]  Near syncope [R55]  Age/Sex: 41 y o  male  Admission Orders:  Scheduled Medications:  atorvastatin, 20 mg, Oral, Daily With Dinner  insulin lispro, 1-6 Units, Subcutaneous, TID AC  venlafaxine, 37 5 mg, Oral, Daily      Continuous IV Infusions:  sodium chloride, 150 mL/hr, Intravenous, Continuous      PRN Meds:  acetaminophen, 650 mg, Oral, Q6H PRN  aluminum-magnesium hydroxide-simethicone, 30 mL, Oral, Q6H PRN  ondansetron, 4 mg, Intravenous, Q6H PRN    Tele  POC GLUCOSE AC/HS WITH SSI COVERAGE   OOB  CPAP at [facility]     Network Utilization Review Department  ATTENTION: Please call with any questions or concerns to [phone] and carefully listen to the prompts so that you are directed to the right person  All voicemails are confidential   Janell Braxton all requests for admission clinical reviews, approved or denied determinations and any other requests to dedicated fax number below belonging to the campus where the patient is receiving treatment   List of dedicated fax numbers for the Facilities:  [address] [place] [address] DENIALS (Administrative/Medical Necessity) [phone]   [address] [place] [address] (Maternity/NICU/Pediatrics) [phone]   [address] [phone]   [facility] [phone]   [address] [place] [address] [place] [address] [address] [place] 28 [phone]   [address] [facility] [address] [phone]

## 2022-11-22 NOTE — ASSESSMENT & PLAN NOTE
With significant hypotension in the emergency department, fluid responsive  · Likely related to poor oral intake due to Ozempic use  · No signs of acute infectious process  · UA negative, CT abdomen pelvis negative for any acute pathology  · Procalcitonin negative, without lactic acidosis  · Now improved  · recommend d/c ozempic

## 2022-11-23 ENCOUNTER — TRANSITIONAL CARE MANAGEMENT (OUTPATIENT)
Dept: INTERNAL MEDICINE CLINIC | Facility: CLINIC | Age: 47
End: 2022-11-23

## 2022-11-29 NOTE — PROGRESS NOTES
INTERNAL MEDICINE OFFICE VISIT  [facility] Internal Medicine- [place]    NAME: Karla Mireles  AGE: 41 y o  SEX: male    DATE OF ENCOUNTER: 11/30/2022    Assessment and Plan     Here today for TCM appointment  Medical history of type 2 diabetes, hyperlipidemia, anxiety, obesity, former smoker, YURIY, small-bowel obstruction secondary to omental adhesion status post laparoscopic lysis of adhesions    He presented to the hospital on 11/21 with syncope  He had presented to the ER earlier that day with episode of dizziness/syncope and was discharged  Re-evaluated in the ER and was found to be significantly hypotensive  Given IV fluids with improvement in BP  Monitored on telemetry without any findings of pathologic arrhythmias  Infectious workup was negative  It was felt that the patient's symptoms may have been related to vasovagal syncope with component of volume depletion, partly related to side effects from Ozempic/decreased po intake  It was recommended that he discontinue his Ozempic  He was deemed stable for discharge home      1  Syncope, unspecified syncope type  Hospital course as above  Patient appears to be back at baseline  No concerns today  No recurrence of syncope  /78, 120/80 on recheck  Continue to monitor off Ozempic  -return to the office in March for next routine follow-up    2  Obstructive sleep apnea    3  Type 2 diabetes mellitus without complication, without long-term current use of insulin ([facility] )    4  Hypotension, unspecified hypotension type    5  Hyperlipidemia, unspecified hyperlipidemia type    6   Encounter for immunization  - FLUBLOK: influenza vaccine, quadrivalent, recombinant, PF, 0 5 mL          Orders Placed This Encounter   Procedures   • FLUBLOK: influenza vaccine, quadrivalent, recombinant, PF, 0 5 mL       Chief Complaint     Chief Complaint   Patient presents with   • Transition of Care Management     D/C [facility] 11 22 22 for Syncope       TCM Call Date and time call was made  11/23/2022  2:48 PM    Patient was hospitialized at  [facility]    Date of Admission  11/21/22    Date of discharge  11/22/22    Diagnosis  Syncope    Disposition  Home    Were the patients medications reviewed and updated  Yes    Current Symptoms  None      TCM Call     Post hospital issues  None    Should patient be enrolled in anticoag monitoring? No    Scheduled for follow up? Yes    Did you obtain your prescribed medications  Yes    Do you need help managing your prescriptions or medications  No    Is transportation to your appointment needed  No    I have advised the patient to call PCP with any new or worsening symptoms  Delmy CONCEPCION)           History of Present Illness       The following portions of the patient's history were reviewed and updated as appropriate: allergies, current medications, past family history, past medical history, past social history, past surgical history and problem list     Review of Systems     10 point ROS negative except per HPI    Active Problem List     Patient Active Problem List   Diagnosis   • Type 2 diabetes mellitus without complication, without long-term current use of insulin ([address] )   • Hyperlipidemia   • Anxiety   • Obstructive sleep apnea   • Obesity, Class I, BMI 30-34 9   • SBO (small bowel obstruction) (HCC)   • Syncope   • Hypotension       Objective     /78 (BP Location: Left arm, Patient Position: Sitting, Cuff Size: Standard)   Pulse 76   Temp 98 1 °F (36 7 °C)   Resp 12   Ht 5' 9" (1 753 m)   Wt 108 kg (238 lb)   BMI 35 15 kg/m²     Physical Exam  Constitutional:       Appearance: Normal appearance  He is not ill-appearing  HENT:      Head: Normocephalic and atraumatic  Eyes:      General: No scleral icterus  Right eye: No discharge  Left eye: No discharge  Cardiovascular:      Rate and Rhythm: Normal rate and regular rhythm  Heart sounds: No murmur heard      No friction rub    Pulmonary:      Effort: Pulmonary effort is normal       Breath sounds: Normal breath sounds  No wheezing or rales  Abdominal:      General: Abdomen is flat  There is no distension  Palpations: Abdomen is soft  Tenderness: There is no abdominal tenderness  Musculoskeletal:         General: No swelling or tenderness  Skin:     General: Skin is warm and dry  Findings: No erythema  Neurological:      Mental Status: He is alert  Mental status is at baseline  Motor: No weakness  Psychiatric:         Mood and Affect: Mood normal          Behavior: Behavior normal          Pertinent Laboratory/Diagnostic Studies:  CT abdomen pelvis with contrast    Result Date: 11/21/2022  Impression: Nonobstructing bilateral renal calculi  No hydronephrosis   Workstation performed: [id number]       Images and diagnostics reviewed     Current Medications     Current Outpatient Medications:   •  atorvastatin (LIPITOR) 20 mg tablet, Take 1 tablet (20 mg total) by mouth daily, Disp: 90 tablet, Rfl: 1  •  metFORMIN (GLUCOPHAGE) 1000 MG tablet, Take 1,000 mg by mouth 2 (two) times a day, Disp: , Rfl:   •  venlafaxine (EFFEXOR-XR) 37 5 mg 24 hr capsule, Take 37 5 mg by mouth daily, Disp: , Rfl:     Health Maintenance     Health Maintenance   Topic Date Due   • Colorectal Cancer Screening  Never done   • Pneumococcal Vaccine: Pediatrics (0 to 5 Years) and At-Risk Patients (6 to 59 Years) (2 - PCV) 11/26/2022   • Depression Screening  03/08/2023   • Diabetic Foot Exam  03/08/2023   • HEMOGLOBIN A1C  03/29/2023   • URINE MICROALBUMIN  05/17/2023   • Annual Physical  09/09/2023   • BMI: Followup Plan  10/21/2023   • BMI: Adult  11/30/2023   • DM Eye Exam  06/08/2024   • DTaP,Tdap,and Td Vaccines (3 - Td or Tdap) 09/21/2025   • HIV Screening  Completed   • Hepatitis C Screening  Completed   • Hepatitis B Vaccine  Completed   • Influenza Vaccine  Completed   • COVID-19 Vaccine  Completed   • HIB Vaccine  Aged Out • IPV Vaccine  Aged Out   • Hepatitis A Vaccine  Aged Out   • Meningococcal ACWY Vaccine  Aged Out   • HPV Vaccine  Aged Out     Immunization History   Administered Date(s) Administered   • COVID-19 PFIZER VACCINE 0 3 ML IM 03/25/2021, 04/22/2021, 10/15/2021   • Hep A, adult 09/21/2015, 05/23/2016   • Hep B, adult 03/01/2016, 05/23/2016, 03/13/2017   • INFLUENZA 12/30/2016, 09/20/2017, 11/26/2021   • Influenza, recombinant, quadrivalent,injectable, preservative free 11/30/2022   • Influenza, seasonal, injectable 12/29/2010, 10/07/2011, 11/08/2013, 12/13/2014, 09/21/2015   • Pneumococcal Polysaccharide PPV23 11/26/2021   • Tdap 09/12/2007, 09/21/2015       JESSICA Cummins  Internal Medicine [place] [address], [facility] [address]  [place], [address] [facility]  Office: [phone]  Fax: [phone]

## 2022-11-30 ENCOUNTER — OFFICE VISIT (OUTPATIENT)
Dept: INTERNAL MEDICINE CLINIC | Facility: CLINIC | Age: 47
End: 2022-11-30

## 2022-11-30 VITALS
RESPIRATION RATE: 12 BRPM | HEART RATE: 76 BPM | TEMPERATURE: 98.1 F | DIASTOLIC BLOOD PRESSURE: 78 MMHG | SYSTOLIC BLOOD PRESSURE: 118 MMHG | WEIGHT: 238 LBS | BODY MASS INDEX: 35.25 KG/M2 | HEIGHT: 69 IN

## 2022-11-30 DIAGNOSIS — G47.33 OBSTRUCTIVE SLEEP APNEA: ICD-10-CM

## 2022-11-30 DIAGNOSIS — R55 SYNCOPE, UNSPECIFIED SYNCOPE TYPE: Primary | ICD-10-CM

## 2022-11-30 DIAGNOSIS — I95.9 HYPOTENSION, UNSPECIFIED HYPOTENSION TYPE: ICD-10-CM

## 2022-11-30 DIAGNOSIS — E11.9 TYPE 2 DIABETES MELLITUS WITHOUT COMPLICATION, WITHOUT LONG-TERM CURRENT USE OF INSULIN (HCC): ICD-10-CM

## 2022-11-30 DIAGNOSIS — E78.5 HYPERLIPIDEMIA, UNSPECIFIED HYPERLIPIDEMIA TYPE: ICD-10-CM

## 2022-11-30 DIAGNOSIS — Z23 ENCOUNTER FOR IMMUNIZATION: ICD-10-CM

## 2022-12-15 ENCOUNTER — OFFICE VISIT (OUTPATIENT)
Dept: BARIATRICS | Facility: CLINIC | Age: 47
End: 2022-12-15

## 2022-12-15 VITALS
RESPIRATION RATE: 16 BRPM | WEIGHT: 246.6 LBS | DIASTOLIC BLOOD PRESSURE: 78 MMHG | OXYGEN SATURATION: 98 % | HEIGHT: 69 IN | SYSTOLIC BLOOD PRESSURE: 110 MMHG | BODY MASS INDEX: 36.53 KG/M2 | HEART RATE: 76 BPM

## 2022-12-15 DIAGNOSIS — G47.33 OBSTRUCTIVE SLEEP APNEA: ICD-10-CM

## 2022-12-15 DIAGNOSIS — E11.9 TYPE 2 DIABETES MELLITUS WITHOUT COMPLICATION, WITHOUT LONG-TERM CURRENT USE OF INSULIN (HCC): ICD-10-CM

## 2022-12-15 DIAGNOSIS — E66.9 CLASS II OBESITY: Primary | ICD-10-CM

## 2022-12-15 NOTE — ASSESSMENT & PLAN NOTE
Lab Results   Component Value Date    HGBA1C 5 5 09/29/2022   currently on metformin and ozempic was held due to abdominal pain and syncope

## 2022-12-15 NOTE — PROGRESS NOTES
Assessment/Plan:    Obesity, Class I, BMI 30-34 9  - Patient is pursuing Conservative Program   - Initially wanted weight loss surgery, but his insurance does not cover  - Initial weight loss goal of 5-10% weight loss for improved health  - Labs reviewed: CBC, lipid panel, A1C, and CMP 9/29/2022  Chol and LDL elevated, HDL low, alk phos elevated, which may all improve with weight loss  Otherwise, labs within acceptable range  Initial: 245 4 lbs  Current: 238 8 lbs 246 6  Change: -6 6 lbs  Goal: 190 lbs    Goals:  -Start food logging, weighing and measuring food-1800-2000 calories per day  -discussed the need to stop soda intake  recommend weaning off and increasing water intake     -recommend restarting exercise  He does have [employer] membership and we also discussed home programs      - currently on  Metformin for diabetes  -He currently is off ozempic due to episodes of abdomen pain and syncope  He is interested in restarting it as he is not relating the abdomen pain to ozempic and thinks it may have been related to food intake and constipation  He is currently taking OTC supplement colonbroom which has made BM more regular  I recommend he hold medication currently and I will send over a message to his PCP  Will need closer monitoring  ED eval was negative for pancreatitis on CT andother than alk phos of  152 and WBC of 10 23 labs were unremarkable  Patient denies personal history of pancreatitis  Patient also denies personal and family history of medullary thyroid cancer and multiple endocrine neoplasia type 2 (MEN 2 tumor)  - Not a candidate for Topamax due to history of kidney stones       Type 2 diabetes mellitus without complication, without long-term current use of insulin (HCC)    Lab Results   Component Value Date    HGBA1C 5 5 09/29/2022   currently on metformin and ozempic was held due to abdominal pain and syncope    Obstructive sleep apnea  -encouraged continued use of CPAP machine  -may improve with 20-30% weight loss          Follow up in approximately 1 month with Non-Surgical Physician/Advanced Practitioner  Diagnoses and all orders for this visit:    Class II obesity    Type 2 diabetes mellitus without complication, without long-term current use of insulin ([facility] )    Obstructive sleep apnea          Subjective:   Chief Complaint   Patient presents with   • Follow-up     MWM 2 mth fu         Patient ID: Ylois Melendrez  is a 41 y o  male with excess weight/obesity here to pursue weight managment  Patient is pursuing Conservative Program      HPI Here for MWM followup  In August he was started on ozempic for diabetes  Since last seen in October he had ED visit on 11/21  He had felt well prior day but woke up that day with abdomen pain and nausea which was similar to what he had in the past with SBO  He has been using colonbroom  Wt Readings from Last 10 Encounters:   12/15/22 112 kg (246 lb 9 6 oz)   11/30/22 108 kg (238 lb)   11/21/22 104 kg (230 lb)   11/21/22 108 kg (239 lb)   10/21/22 108 kg (238 lb 12 8 oz)   09/29/22 109 kg (240 lb)   09/09/22 111 kg (244 lb)   08/30/22 110 kg (242 lb 3 2 oz)   08/16/22 113 kg (249 lb 12 5 oz)   06/28/22 111 kg (245 lb 6 4 oz)       Food logging:no-has fallen off his diet plan and eating more due to the holidays  Increased appetite/cravings:yes appetitei  Fruit/Vegetable servings:  Exercise: nothing specific,   Hydration:water intake less now, drinking more soda 120oz, unsweetened ice tea    Colonoscopy-Completed    The following portions of the patient's history were reviewed and updated as appropriate: He  has a past medical history of Diabetes ([facility] ), Hyperlipidemia, and YURIY on CPAP    He   Patient Active Problem List    Diagnosis Date Noted   • Syncope 11/21/2022   • Hypotension 11/21/2022   • SBO (small bowel obstruction) ([address] ) 08/16/2022   • Obesity, Class I, BMI 30-34 9 06/28/2022   • Type 2 diabetes mellitus without complication, without long-term current use of insulin ([facility] ) 03/08/2022   • Hyperlipidemia 03/08/2022   • Anxiety 03/08/2022   • Obstructive sleep apnea 03/08/2022     He  has a past surgical history that includes Vasectomy and pr laps abd prtm&omentum dx w/wo spec br/wa spx (N/A, 8/16/2022)  His Family history is unknown by patient  He  reports that he has quit smoking  His smoking use included cigarettes  He smoked an average of 3 packs per day  He has never used smokeless tobacco  He reports that he does not drink alcohol and does not use drugs  Current Outpatient Medications   Medication Sig Dispense Refill   • atorvastatin (LIPITOR) 20 mg tablet Take 1 tablet (20 mg total) by mouth daily 90 tablet 1   • metFORMIN (GLUCOPHAGE) 1000 MG tablet Take 1,000 mg by mouth 2 (two) times a day     • venlafaxine (EFFEXOR-XR) 37 5 mg 24 hr capsule Take 37 5 mg by mouth daily       No current facility-administered medications for this visit  Current Outpatient Medications on File Prior to Visit   Medication Sig   • atorvastatin (LIPITOR) 20 mg tablet Take 1 tablet (20 mg total) by mouth daily   • metFORMIN (GLUCOPHAGE) 1000 MG tablet Take 1,000 mg by mouth 2 (two) times a day   • venlafaxine (EFFEXOR-XR) 37 5 mg 24 hr capsule Take 37 5 mg by mouth daily     No current facility-administered medications on file prior to visit  He has No Known Allergies       Review of Systems   Constitutional: Negative for fever  Eyes: Negative for pain and visual disturbance  Respiratory: Negative for cough and shortness of breath  Cardiovascular: Negative for chest pain and palpitations  Gastrointestinal: Negative for abdominal pain and vomiting  Genitourinary: Negative for dysuria and hematuria  Musculoskeletal: Negative for arthralgias and back pain  Skin: Negative for color change and rash  Neurological: Negative for seizures and syncope  All other systems reviewed and are negative        Objective:    BP 110/78   Pulse 76   Resp 16   Ht 5' 9" (1 753 m)   Wt 112 kg (246 lb 9 6 oz)   SpO2 98%   BMI 36 42 kg/m²      Physical Exam  Vitals and nursing note reviewed  Constitutional:       General: He is not in acute distress  Appearance: He is well-developed  He is obese  HENT:      Head: Normocephalic and atraumatic  Eyes:      Conjunctiva/sclera: Conjunctivae normal    Neck:      Thyroid: No thyromegaly  Pulmonary:      Effort: Pulmonary effort is normal  No respiratory distress  Skin:     Findings: No rash (visible)  Neurological:      Mental Status: He is alert and oriented to person, place, and time     Psychiatric:         Mood and Affect: Mood normal          Behavior: Behavior normal

## 2022-12-15 NOTE — PROGRESS NOTES
Assessment/Plan:    Obesity, Class I, BMI 30-34 9  - Patient is pursuing Conservative Program   - Initially wanted weight loss surgery, but his insurance does not cover  - Initial weight loss goal of 5-10% weight loss for improved health  - Already on metformin   - Not a candidate for Topamax due to history of kidney stones  - Patient denies personal history of pancreatitis  Patient also denies personal and family history of medullary thyroid cancer and multiple endocrine neoplasia type 2 (MEN 2 tumor)  - Ozempic restarted August 2022  Weight around that time was 249 7 lbs per ED record  - Currently on Ozempic 0 5 mg weekly  No negative side effects  Has noticed an improvement with appetite, but feels that the effect could last longer  Discussed increasing to 1 mg weekly and he was agreeable  He will monitor for signs of hypoglycemia    - Labs reviewed: CBC, lipid panel, A1C, and CMP 9/29/2022  Chol and LDL elevated, HDL low, alk phos elevated, which may all improve with weight loss  Otherwise, labs within acceptable range  Initial: 245 4 lbs  Current: 238 8 lbs 246 6  Change: -6 6 lbs  Goal: 190 lbs    Goals:  Start food logging, weighing and measuring food  Eliminate soda  Keep up the great work with water intake   Increase gym to 2-3 days per week with gradual increase to 5 days per week 30 minutes cardiovascular exercise with 2 days of resistance training  [id number] calories per day  Try to keep starch to 1/2 cup per serving  Follow up in approximately {FOLLOW UP:89309} with {WM Follow Up:78553}  Diagnoses and all orders for this visit:    Obesity, Class I, BMI 30-34 9          Subjective:   Chief Complaint   Patient presents with   • Follow-up     MWM 2 mth fu         Patient ID: Indiana Gamez  is a 41 y o  male with excess weight/obesity here to pursue weight managment  Patient is pursuing {PT INTEREST (Optional):45449}       HPI    Wt Readings from Last 10 Encounters: 12/15/22 112 kg (246 lb 9 6 oz)   11/30/22 108 kg (238 lb)   11/21/22 104 kg (230 lb)   11/21/22 108 kg (239 lb)   10/21/22 108 kg (238 lb 12 8 oz)   09/29/22 109 kg (240 lb)   09/09/22 111 kg (244 lb)   08/30/22 110 kg (242 lb 3 2 oz)   08/16/22 113 kg (249 lb 12 5 oz)   06/28/22 111 kg (245 lb 6 4 oz)       Food logging:  Increased appetite/cravings:  Fruit/Vegetable servings:  Exercise:  Hydration:    Colonoscopy-{yes/no/not applicable:25856}    {Common ambulatory SmartLinks:56544}    Review of Systems    Objective:    /78   Pulse 76   Resp 16   Ht 5' 9" (1 753 m)   Wt 112 kg (246 lb 9 6 oz)   SpO2 98%   BMI 36 42 kg/m²      Physical Exam

## 2022-12-15 NOTE — ASSESSMENT & PLAN NOTE
- Patient is pursuing Conservative Program   - Initially wanted weight loss surgery, but his insurance does not cover  - Initial weight loss goal of 5-10% weight loss for improved health  - Labs reviewed: CBC, lipid panel, A1C, and CMP 9/29/2022  Chol and LDL elevated, HDL low, alk phos elevated, which may all improve with weight loss  Otherwise, labs within acceptable range  Initial: 245 4 lbs  Current: 238 8 lbs 246 6  Change: -6 6 lbs  Goal: 190 lbs    Goals:  -Start food logging, weighing and measuring food-1800-2000 calories per day  -discussed the need to stop soda intake  recommend weaning off and increasing water intake     -recommend restarting exercise  He does have [employer] membership and we also discussed home programs      - currently on  Metformin for diabetes  -He currently is off ozempic due to episodes of abdomen pain and syncope  He is interested in restarting it as he is not relating the abdomen pain to ozempic and thinks it may have been related to food intake and constipation  He is currently taking OTC supplement colonbroom which has made BM more regular  I recommend he hold medication currently and I will send over a message to his PCP  Will need closer monitoring  ED eval was negative for pancreatitis on CT andother than alk phos of  152 and WBC of 10 23 labs were unremarkable  Patient denies personal history of pancreatitis  Patient also denies personal and family history of medullary thyroid cancer and multiple endocrine neoplasia type 2 (MEN 2 tumor)  - Not a candidate for Topamax due to history of kidney stones

## 2022-12-21 DIAGNOSIS — E66.9 OBESITY, CLASS I, BMI 30-34.9: ICD-10-CM

## 2022-12-21 DIAGNOSIS — E11.9 TYPE 2 DIABETES MELLITUS WITHOUT COMPLICATION, WITHOUT LONG-TERM CURRENT USE OF INSULIN (HCC): Primary | ICD-10-CM

## 2022-12-21 RX ORDER — SEMAGLUTIDE 1.34 MG/ML
0.25 INJECTION, SOLUTION SUBCUTANEOUS WEEKLY
Qty: 1.5 ML | Refills: 1 | Status: SHIPPED | OUTPATIENT
Start: 2022-12-21 | End: 2023-04-18

## 2023-01-29 ENCOUNTER — PATIENT MESSAGE (OUTPATIENT)
Dept: INTERNAL MEDICINE CLINIC | Facility: CLINIC | Age: 48
End: 2023-01-29

## 2023-01-29 DIAGNOSIS — G47.33 OBSTRUCTIVE SLEEP APNEA: Primary | ICD-10-CM

## 2023-02-01 NOTE — TELEPHONE ENCOUNTER
From: Gretchen Gasca  To: Raymond Jimenez  Sent: 1/29/2023 11:55 AM EST  Subject: Sleep apnea    I was wondering if you could get me a referral or on the waiting list to have the new sleep apnea implant that will be available in March  I would love to get this done so I don’t have to use a CPAP anymore   Thank you

## 2023-02-02 ENCOUNTER — OFFICE VISIT (OUTPATIENT)
Dept: BARIATRICS | Facility: CLINIC | Age: 48
End: 2023-02-02

## 2023-02-02 VITALS
HEART RATE: 66 BPM | SYSTOLIC BLOOD PRESSURE: 124 MMHG | OXYGEN SATURATION: 99 % | HEIGHT: 69 IN | WEIGHT: 251.2 LBS | DIASTOLIC BLOOD PRESSURE: 70 MMHG | BODY MASS INDEX: 37.2 KG/M2

## 2023-02-02 DIAGNOSIS — E11.9 TYPE 2 DIABETES MELLITUS WITHOUT COMPLICATION, WITHOUT LONG-TERM CURRENT USE OF INSULIN (HCC): ICD-10-CM

## 2023-02-02 DIAGNOSIS — E78.5 HYPERLIPIDEMIA, UNSPECIFIED HYPERLIPIDEMIA TYPE: ICD-10-CM

## 2023-02-02 DIAGNOSIS — E66.01 CLASS 2 SEVERE OBESITY DUE TO EXCESS CALORIES WITH SERIOUS COMORBIDITY AND BODY MASS INDEX (BMI) OF 37.0 TO 37.9 IN ADULT (HCC): Primary | ICD-10-CM

## 2023-02-02 NOTE — ASSESSMENT & PLAN NOTE
- Patient is pursuing Conservative Program   - may be interested in surgery  - Initial weight loss goal of 5-10% weight loss for improved health  - Labs reviewed: CBC, lipid panel, A1C, and CMP 9/29/2022  Chol and LDL elevated, HDL low, alk phos elevated, which may all improve with weight loss  Otherwise, labs within acceptable range  Initial: 245 4 lbs  Current:  251 2  Change: +5 8lb  Goal: 190 lbs    Goals:  -Start food logging, weighing and measuring food-1800  -continue to decrease soda intake   -recommend restarting exercise- at least 2 times a week    - currently on  Metformin for diabetes  -Currently on week 1 of ozempic  To continue and increase dose to 0 5mg after 3 more weeks  ozempic was stoped prior due to GI upset/constipation  He is currently taking OTC supplement colonbroom which has made BM more regular  Patient denies personal history of pancreatitis  Patient also denies personal and family history of medullary thyroid cancer and multiple endocrine neoplasia type 2 (MEN 2 tumor)  - Not a candidate for Topamax due to history of kidney stones

## 2023-02-02 NOTE — ASSESSMENT & PLAN NOTE
Lab Results   Component Value Date    HGBA1C 5 5 09/29/2022   currently on metformin and ozempic 0 25mg

## 2023-02-02 NOTE — PROGRESS NOTES
Assessment/Plan:    Class 2 severe obesity due to excess calories with serious comorbidity and body mass index (BMI) of 37 0 to 37 9 in [facility])  - Patient is pursuing Conservative Program   - may be interested in surgery  - Initial weight loss goal of 5-10% weight loss for improved health  - Labs reviewed: CBC, lipid panel, A1C, and CMP 9/29/2022  Chol and LDL elevated, HDL low, alk phos elevated, which may all improve with weight loss  Otherwise, labs within acceptable range  Initial: 245 4 lbs  Current:  251 2  Change: +5 8lb  Goal: 190 lbs    Goals:  -Start food logging, weighing and measuring food-1800  -continue to decrease soda intake   -recommend restarting exercise- at least 2 times a week    - currently on  Metformin for diabetes  -Currently on week 1 of ozempic  To continue and increase dose to 0 5mg after 3 more weeks  ozempic was stoped prior due to GI upset/constipation  He is currently taking OTC supplement colonbroom which has made BM more regular  Patient denies personal history of pancreatitis  Patient also denies personal and family history of medullary thyroid cancer and multiple endocrine neoplasia type 2 (MEN 2 tumor)  - Not a candidate for Topamax due to history of kidney stones  Type 2 diabetes mellitus without complication, without long-term current use of insulin (HCC)    Lab Results   Component Value Date    HGBA1C 5 5 09/29/2022   currently on metformin and ozempic 0 25mg    Hyperlipidemia  - Taking atorvastatin  May improve with weight loss and lifestyle modification  Continue management with prescribing provider  Follow up in approximately 2 months with Non-Surgical Physician/Advanced Practitioner       Diagnoses and all orders for this visit:    Class 2 severe obesity due to excess calories with serious comorbidity and body mass index (BMI) of 37 0 to 37 9 in [facility])    Type 2 diabetes mellitus without complication, without long-term current use of insulin ([facility] [address] )    Hyperlipidemia, unspecified hyperlipidemia type          Subjective:   Chief Complaint   Patient presents with   • Follow-up     MWM 2 mth fu         Patient ID: Yazan Bro  is a 41 y o  male with excess weight/obesity here to pursue weight managment  Patient is pursuing Conservative Program      HPI Here for MWM follow up  He just started ozempic 0 25mg last week  He denies any side effects  He is taking colon broom and BM are regular  He has cut back on soda intake  He was drinking more than 100 oz a day and now is having 1 soda a day  Wt Readings from Last 10 Encounters:   02/02/23 114 kg (251 lb 3 2 oz)   12/15/22 112 kg (246 lb 9 6 oz)   11/30/22 108 kg (238 lb)   11/21/22 104 kg (230 lb)   11/21/22 108 kg (239 lb)   10/21/22 108 kg (238 lb 12 8 oz)   09/29/22 109 kg (240 lb)   09/09/22 111 kg (244 lb)   08/30/22 110 kg (242 lb 3 2 oz)   08/16/22 113 kg (249 lb 12 5 oz)       Food logging:is restarting now, 1800 veronica goal  Increased appetite/cravings:yes appetitei   Fruit/Vegetable servings:   Exercise: nothing specific,  Hydration:water 1 gal a day, 1 can of soda a day, unsweetened iced tea 2-3 a day    Diet Recall:  Fasting from 7 PM to 11AM  L:varies-sometimes dining out  D: usually at home  Protein and sometimes vegetable  Colonoscopy-due now    The following portions of the patient's history were reviewed and updated as appropriate: He  has a past medical history of Diabetes ([facility] [address] ), Hyperlipidemia, and YURIY on CPAP    He   Patient Active Problem List    Diagnosis Date Noted   • Syncope 11/21/2022   • Hypotension 11/21/2022   • SBO (small bowel obstruction) ([address] ) 08/16/2022   • Class 2 severe obesity due to excess calories with serious comorbidity and body mass index (BMI) of 37 0 to 37 9 in [facility]) 06/28/2022   • Type 2 diabetes mellitus without complication, without long-term current use of insulin ([address] ) 03/08/2022   • Hyperlipidemia 03/08/2022   • Anxiety 03/08/2022   • Obstructive sleep apnea 03/08/2022     He  has a past surgical history that includes Vasectomy and pr laps abd prtm&omentum dx w/wo spec br/wa spx (N/A, 8/16/2022)  His Family history is unknown by patient  He  reports that he has quit smoking  His smoking use included cigarettes  He smoked an average of 3 packs per day  He has never used smokeless tobacco  He reports that he does not drink alcohol and does not use drugs  Current Outpatient Medications   Medication Sig Dispense Refill   • atorvastatin (LIPITOR) 20 mg tablet Take 1 tablet (20 mg total) by mouth daily 90 tablet 1   • metFORMIN (GLUCOPHAGE) 1000 MG tablet Take 1,000 mg by mouth 2 (two) times a day     • Semaglutide,0 25 or 0 5MG/DOS, (Ozempic, 0 25 or 0 5 MG/DOSE,) 2 MG/1 5ML SOPN Inject 0 25 mg under the skin once a week 1 5 mL 1   • venlafaxine (EFFEXOR-XR) 37 5 mg 24 hr capsule Take 37 5 mg by mouth daily       No current facility-administered medications for this visit  Current Outpatient Medications on File Prior to Visit   Medication Sig   • atorvastatin (LIPITOR) 20 mg tablet Take 1 tablet (20 mg total) by mouth daily   • metFORMIN (GLUCOPHAGE) 1000 MG tablet Take 1,000 mg by mouth 2 (two) times a day   • Semaglutide,0 25 or 0 5MG/DOS, (Ozempic, 0 25 or 0 5 MG/DOSE,) 2 MG/1 5ML SOPN Inject 0 25 mg under the skin once a week   • venlafaxine (EFFEXOR-XR) 37 5 mg 24 hr capsule Take 37 5 mg by mouth daily     No current facility-administered medications on file prior to visit  He has No Known Allergies       Review of Systems   Constitutional: Negative for fatigue and fever  Respiratory: Negative for cough and shortness of breath  Cardiovascular: Negative for chest pain and palpitations  Gastrointestinal: Negative for abdominal pain, constipation and vomiting  Genitourinary: Negative for difficulty urinating and dysuria  Musculoskeletal: Negative for arthralgias and back pain  Skin: Negative for rash     Neurological: Negative for seizures and syncope  All other systems reviewed and are negative  Objective:    /70   Pulse 66   Ht 5' 9" (1 753 m)   Wt 114 kg (251 lb 3 2 oz)   SpO2 99%   BMI 37 10 kg/m²      Physical Exam  Vitals and nursing note reviewed  Constitutional:       General: He is not in acute distress  Appearance: He is well-developed  He is obese  HENT:      Head: Normocephalic and atraumatic  Eyes:      Conjunctiva/sclera: Conjunctivae normal    Neck:      Thyroid: No thyromegaly  Pulmonary:      Effort: Pulmonary effort is normal  No respiratory distress  Skin:     Findings: No rash (visible)  Neurological:      Mental Status: He is alert and oriented to person, place, and time     Psychiatric:         Mood and Affect: Mood normal          Behavior: Behavior normal

## 2023-02-08 ENCOUNTER — OFFICE VISIT (OUTPATIENT)
Dept: SLEEP CENTER | Facility: CLINIC | Age: 48
End: 2023-02-08

## 2023-02-08 VITALS
BODY MASS INDEX: 37.86 KG/M2 | SYSTOLIC BLOOD PRESSURE: 125 MMHG | HEART RATE: 88 BPM | HEIGHT: 69 IN | WEIGHT: 255.6 LBS | DIASTOLIC BLOOD PRESSURE: 64 MMHG

## 2023-02-08 DIAGNOSIS — G47.33 OBSTRUCTIVE SLEEP APNEA: ICD-10-CM

## 2023-02-08 PROBLEM — L93.0 DISCOID LUPUS: Status: ACTIVE | Noted: 2023-02-08

## 2023-02-08 PROBLEM — F33.0 MILD EPISODE OF RECURRENT MAJOR DEPRESSIVE DISORDER (HCC): Status: ACTIVE | Noted: 2021-11-26

## 2023-02-08 NOTE — PATIENT INSTRUCTIONS
As we discussed at present you do not qualify for Inspire- you would need to lose weight  You would also need a new sleep study    I would recommend switching to the new CPAP machine you received from [facility] and not using the inline filter      It is very important to avoid driving while drowsy, this can be very dangerous or even cause serious injury or death  If sleepy, it is not safe to get behind the wheel  If you are driving and feels sleepy, it is very important to pull over right away  Even losing control of the car for a split second can be deadly  If you feel you cannot control when sleepiness occurs and cannot prevented, it is important to not drive at all until this improves  Please let me know if you experience this as it is very important  Please bring your machine to the next visit     Nursing Support:  When: Monday through Friday 7A-5PM except holidays  Where: Our direct line is [phone]  If you are having a true emergency please call 911  In the event that the line is busy or it is after hours please leave a voice message and we will return your call  Please speak clearly, leaving your full name, birth date, best number to reach you and the reason for your call  Medication refills: We will need the name of the medication, the dosage, the ordering provider, whether you get a 30 or 90 day refill, and the pharmacy name and address  Medications will be ordered by the provider only  Nurses cannot call in prescriptions  Please allow 7 days for medication refills  Physician requested updates: If your provider requested that you call with an update after starting medication, please be ready to provide us the medication and dosage, what time you take your medication, the time you attempt to fall asleep, time you fall asleep, when you wake up, and what time you get out of bed  Sleep Study Results:  We will contact you with sleep study results and/or next steps after the physician has reviewed your testing

## 2023-02-08 NOTE — PROGRESS NOTES
Assessment/Plan:      1  Obstructive sleep apnea  -     Ambulatory Referral to Sleep Medicine     We reviewed his history and date we discussed that at present he is not a candidate for hypoglossal nerve stimulator as his body mass index is above 35  Many insurances use a BMI cutoff of 35 although some require a BMI less than 32 to qualify for the inspire device  A repeat sleep study would be needed to determine if he is a candidate  The last sleep test to an outside records from [facility] showed mild obstructive sleep apnea in 2011  Typically an AHI of 15 or higher is needed to qualify  He would be most appropriate to repeat his sleep test once he has lost weight and has a BMI less than 35  The interim, I agree with his use of CPAP  He has a replace machine from [facility], I encouraged him to switch to that machine  We discussed that use of an inline filter is not recommended by [facility], that could affect the calibration of an AutoPap machine  He has not changed his inline filter in the year since he has had it which is not ideal and I recommend he discontinue use of it  I would like to follow-up with him in a few months, asked him to bring his CPAP machine to the visit  Subjective:      Patient ID: Keira Rivers is a 41 y o  male  Mr  Kathryn Davalos is a 79-year-old male who presents as a new patient at considering a hypoglossal nerve stimulator  He previously has seen sleep medicine in [place], they describe a test in 2011 that showed mild obstructive sleep apnea, AHI 5  He was initially titrated to CPAP of 10 cm H2O which was effective  He recalls he was diagnosed with YURIY then as he had sleepiness  CPAP has helped symptomatically but he would rather not used , recalls a weight of 180 pounds  The patient messaged his primary care physician about possible hypoglossal nerve stimulator surgery for sleep apnea and he was referred for my assessment      The patient goes to bed at 10 PM, is asleep by 1030, is awake at 630  He has 1-2 awakenings during the night  Returns to sleep quickly with awakenings    He is sometimes refreshed when he wakes  No napping, no dozing  No drowsy driving    He does not snore with CPAP  If he takes a nap without CPAP, he still snores some  No dry nose or mouth with CPAP  Occ nose bleeds, R sided, last 1-2 weeks ago- no nasal congestion   He has witnessed breathing pauses and uses CPAP, his CPAP settings are 9 5-15 cm H2O  Uses a full face mask  Has an inline filter      Berlin Sleepiness Scale:     Sitting and reading: Slight chance of dozing  Watching TV: Slight chance of dozing  Sitting, inactive in a public place (e g  a theatre or a meeting): Slight chance of dozing  As a passenger in a car for an hour without a break: Slight chance of dozing  Lying down to rest in the afternoon when circumstances permit: Slight chance of dozing  Sitting and talking to someone: Slight chance of dozing  Sitting quietly after a lunch without alcohol: Slight chance of dozing  In a car, while stopped for a few minutes in traffic: Slight chance of dozing  Total score: 8     The following portions of the patient's history were reviewed and updated as appropriate: allergies, current medications, past family history, past medical history, past social history, past surgical history, and problem list     Review of Systems   Constitutional: Positive for fatigue  HENT: Positive for nosebleeds  Negative for congestion  Respiratory: Negative for cough, shortness of breath and wheezing  Cardiovascular: Negative for palpitations and leg swelling  Gastrointestinal:        + air swallowing   Musculoskeletal: Positive for back pain  Neurological: Negative for headaches  Psychiatric/Behavioral: Positive for decreased concentration (mild)  Negative for suicidal ideas  The patient is nervous/anxious (mild)            Objective:        /64 (BP Location: Left arm, Patient Position: Sitting, Cuff Size: Standard)   Pulse 88   Ht 5' 9" (1 753 m)   Wt 116 kg (255 lb 9 6 oz)   BMI 37 75 kg/m²     Physical Exam  Constitutional:       Appearance: Normal appearance  He is obese  HENT:      Head: Normocephalic and atraumatic  Mouth/Throat:      Mouth: Mucous membranes are moist    Eyes:      Extraocular Movements: Extraocular movements intact  Pupils: Pupils are equal, round, and reactive to light  Cardiovascular:      Rate and Rhythm: Normal rate  Pulses: Normal pulses  Heart sounds: Normal heart sounds  Pulmonary:      Effort: Pulmonary effort is normal       Breath sounds: Normal breath sounds  Musculoskeletal:      Right lower leg: No edema  Left lower leg: No edema  Neurological:      Mental Status: He is alert  Psychiatric:         Mood and Affect: Mood normal          Behavior: Behavior normal          Thought Content: Thought content normal          Judgment: Judgment normal         17 75 in neck circumference  mallampati 4 airway, elongated soft palate  Mild torus palintus, mild overbite     Data reviewed-from primary care to correlate with history, blood work including serum carbon dioxide level-his most recent level of 25 mmol/L is normal and not suggestive of obesity hypoventilation syndrome    [facility]

## 2023-03-10 ENCOUNTER — OFFICE VISIT (OUTPATIENT)
Dept: FAMILY MEDICINE CLINIC | Facility: CLINIC | Age: 48
End: 2023-03-10

## 2023-03-10 ENCOUNTER — APPOINTMENT (OUTPATIENT)
Dept: RADIOLOGY | Facility: CLINIC | Age: 48
End: 2023-03-10

## 2023-03-10 VITALS
TEMPERATURE: 97 F | HEART RATE: 72 BPM | WEIGHT: 252 LBS | SYSTOLIC BLOOD PRESSURE: 128 MMHG | OXYGEN SATURATION: 98 % | BODY MASS INDEX: 37.33 KG/M2 | HEIGHT: 69 IN | DIASTOLIC BLOOD PRESSURE: 78 MMHG

## 2023-03-10 DIAGNOSIS — J22 LOWER RESPIRATORY INFECTION: ICD-10-CM

## 2023-03-10 DIAGNOSIS — Z12.11 SCREENING FOR COLON CANCER: ICD-10-CM

## 2023-03-10 DIAGNOSIS — E11.9 TYPE 2 DIABETES MELLITUS WITHOUT COMPLICATION, WITHOUT LONG-TERM CURRENT USE OF INSULIN (HCC): Primary | ICD-10-CM

## 2023-03-10 DIAGNOSIS — J01.90 ACUTE SINUSITIS WITH SYMPTOMS > 10 DAYS: ICD-10-CM

## 2023-03-10 DIAGNOSIS — E78.5 HYPERLIPIDEMIA, UNSPECIFIED HYPERLIPIDEMIA TYPE: ICD-10-CM

## 2023-03-10 DIAGNOSIS — G47.33 OBSTRUCTIVE SLEEP APNEA: ICD-10-CM

## 2023-03-10 RX ORDER — DOXYCYCLINE HYCLATE 100 MG/1
100 CAPSULE ORAL EVERY 12 HOURS SCHEDULED
Qty: 20 CAPSULE | Refills: 0 | Status: SHIPPED | OUTPATIENT
Start: 2023-03-10 | End: 2023-03-20

## 2023-03-10 RX ORDER — ALBUTEROL SULFATE 90 UG/1
2 AEROSOL, METERED RESPIRATORY (INHALATION) EVERY 6 HOURS PRN
Qty: 18 G | Refills: 0 | Status: SHIPPED | OUTPATIENT
Start: 2023-03-10

## 2023-03-10 NOTE — ASSESSMENT & PLAN NOTE
Last check controlled; check labs; c/w current tx; tolerating at this time; updated foot exam; recheck in 1 month  Lab Results   Component Value Date    HGBA1C 5 5 09/29/2022

## 2023-03-10 NOTE — PROGRESS NOTES
Assessment/Plan:     1  Type 2 diabetes mellitus without complication, without long-term current use of insulin (HCC)  Assessment & Plan:  Last check controlled; check labs; c/w current tx; tolerating at this time; updated foot exam; recheck in 1 month  Lab Results   Component Value Date    HGBA1C 5 5 09/29/2022       Orders:  -     Hemoglobin A1C; Future; Expected date: 03/10/2023  -     Comprehensive metabolic panel; Future; Expected date: 03/10/2023  -     CBC and differential; Future; Expected date: 03/10/2023  -     Microalbumin / creatinine urine ratio; Future; Expected date: 03/10/2023  -     Lipid Panel with Direct LDL reflex; Future; Expected date: 03/10/2023  -     TSH, 3rd generation with Free T4 reflex; Future; Expected date: 03/10/2023    2  Acute sinusitis with symptoms > 10 days  Assessment & Plan:  Advised abx as ordered; will check CXR given sx and exam; f/u with results; c/w OTC symptom relief    Orders:  -     doxycycline hyclate (VIBRAMYCIN) 100 mg capsule; Take 1 capsule (100 mg total) by mouth every 12 (twelve) hours for 10 days    3  Lower respiratory infection  Assessment & Plan:  Check CXR to r/o PNA; albuterol given PRN; abx as ordered; f/u guidance given    Orders:  -     XR chest pa & lateral; Future; Expected date: 03/10/2023  -     albuterol (Ventolin HFA) 90 mcg/act inhaler; Inhale 2 puffs every 6 (six) hours as needed for wheezing    4  Screening for colon cancer  -     Ambulatory Referral to Gastroenterology; Future    5  Obstructive sleep apnea  Assessment & Plan:  C/w CPAP; managed by sleep medicine      6  Hyperlipidemia, unspecified hyperlipidemia type  Assessment & Plan:  C/w statin          Subjective:      Patient ID: Gloria Flores is a 41 y o  male  HL: Patient is compliant with medication     Lab Results       Component                Value               Date                       CHOLESTEROL              207 (H)             09/29/2022                 CHOLESTEROL 193                 05/17/2022            Lab Results       Component                Value               Date                       HDL                      37 (L)              09/29/2022                 HDL                      43                  05/17/2022            Lab Results       Component                Value               Date                       TRIG                     128                 09/29/2022                 TRIG                     82                  05/17/2022            Lab Results       Component                Value               Date                       [place]                  170                 09/29/2022                 [place]                  150                 05/17/2022              DM: Patient is currently on metformin and ozempic  Gave up sugar for Lawrence Anirudh  He has noticed about 5 lb weight loss  Gave up soda, candy, or simple sugars  Patient is currently trying to stay under 2000 calories  Has not had any lightheadedness with restarting ozempic  Lab Results       Component                Value               Date                       HGBA1C                   5 5                 09/29/2022              YURIY: Patient would like to lose 5 more pounds and try to get nerve stimulator  Currently has CPAP  Patient is complaining of URI symptoms for about 3 weeks  C/o coughing and congestion  Intermittent sore throat  Has some SOB when is active  Coughing  Taking dayquil and nyquil help with symptoms but not resolving  He has more sinus pressure today         The following portions of the patient's history were reviewed and updated as appropriate: allergies, current medications, past family history, past medical history, past social history, past surgical history, and problem list     Review of Systems      Objective:      /78 (BP Location: Left arm, Patient Position: Sitting, Cuff Size: Large)   Pulse 72   Temp (!) 97 °F (36 1 °C)   Ht 5' 9 25" (1 759 m)   Wt 114 kg (252 lb)   SpO2 98%   BMI 36 95 kg/m²          Physical Exam  Vitals reviewed  Constitutional:       General: He is not in acute distress  Appearance: Normal appearance  He is not ill-appearing, toxic-appearing or diaphoretic  HENT:      Head: Normocephalic and atraumatic  Right Ear: Tympanic membrane normal       Left Ear: Tympanic membrane normal       Nose: Congestion present  Right Sinus: Maxillary sinus tenderness present  No frontal sinus tenderness  Left Sinus: Maxillary sinus tenderness present  No frontal sinus tenderness  Mouth/Throat:      Pharynx: No oropharyngeal exudate  Eyes:      General: No scleral icterus  Right eye: No discharge  Left eye: No discharge  Conjunctiva/sclera: Conjunctivae normal    Cardiovascular:      Rate and Rhythm: Normal rate and regular rhythm  Pulses: Normal pulses  Heart sounds: Normal heart sounds  No murmur heard  No gallop  Pulmonary:      Effort: Pulmonary effort is normal  No respiratory distress  Breath sounds: No stridor  Wheezing present  No rhonchi or rales  Chest:      Comments: +few scattered wheezes B/L  Musculoskeletal:      Cervical back: Neck supple  Right lower leg: No edema  Left lower leg: No edema  Lymphadenopathy:      Cervical: No cervical adenopathy  Neurological:      General: No focal deficit present  Mental Status: He is alert and oriented to person, place, and time  Psychiatric:         Mood and Affect: Mood normal          Behavior: Behavior normal          Thought Content:  Thought content normal          Judgment: Judgment normal

## 2023-03-10 NOTE — PROGRESS NOTES
Diabetic Foot Exam    Patient's shoes and socks removed  Right Foot/Ankle   Right Foot Inspection  Skin Exam: skin normal and skin intact  No dry skin, no warmth, no callus, no erythema, no maceration, no abnormal color, no pre-ulcer, no ulcer and no callus  Toe Exam: ROM and strength within normal limits  Sensory   Monofilament testing: intact    Vascular  Capillary refills: < 3 seconds  The right DP pulse is 2+  The right PT pulse is 2+  Left Foot/Ankle  Left Foot Inspection      Toe Exam: ROM and strength within normal limits  Sensory   Monofilament testing: intact    Vascular  Capillary refills: < 3 seconds  The left DP pulse is 2+  The left PT pulse is 2+       Assign Risk Category  No deformity present  No loss of protective sensation  No weak pulses  Risk: 0

## 2023-03-11 ENCOUNTER — APPOINTMENT (OUTPATIENT)
Dept: LAB | Facility: CLINIC | Age: 48
End: 2023-03-11

## 2023-03-11 DIAGNOSIS — E11.9 TYPE 2 DIABETES MELLITUS WITHOUT COMPLICATION, WITHOUT LONG-TERM CURRENT USE OF INSULIN (HCC): ICD-10-CM

## 2023-03-11 LAB
ALBUMIN SERPL BCP-MCNC: 4.2 G/DL (ref 3.5–5)
ALP SERPL-CCNC: 101 U/L (ref 46–116)
ALT SERPL W P-5'-P-CCNC: 42 U/L (ref 12–78)
ANION GAP SERPL CALCULATED.3IONS-SCNC: 4 MMOL/L (ref 4–13)
AST SERPL W P-5'-P-CCNC: 17 U/L (ref 5–45)
BASOPHILS # BLD AUTO: 0.04 THOUSANDS/ÂΜL (ref 0–0.1)
BASOPHILS NFR BLD AUTO: 1 % (ref 0–1)
BILIRUB SERPL-MCNC: 0.38 MG/DL (ref 0.2–1)
BUN SERPL-MCNC: 15 MG/DL (ref 5–25)
CALCIUM SERPL-MCNC: 9.6 MG/DL (ref 8.3–10.1)
CHLORIDE SERPL-SCNC: 104 MMOL/L (ref 96–108)
CHOLEST SERPL-MCNC: 160 MG/DL
CO2 SERPL-SCNC: 29 MMOL/L (ref 21–32)
CREAT SERPL-MCNC: 0.95 MG/DL (ref 0.6–1.3)
CREAT UR-MCNC: 269 MG/DL
EOSINOPHIL # BLD AUTO: 0.27 THOUSAND/ÂΜL (ref 0–0.61)
EOSINOPHIL NFR BLD AUTO: 5 % (ref 0–6)
ERYTHROCYTE [DISTWIDTH] IN BLOOD BY AUTOMATED COUNT: 12.6 % (ref 11.6–15.1)
EST. AVERAGE GLUCOSE BLD GHB EST-MCNC: 123 MG/DL
GFR SERPL CREATININE-BSD FRML MDRD: 96 ML/MIN/1.73SQ M
GLUCOSE P FAST SERPL-MCNC: 98 MG/DL (ref 65–99)
HBA1C MFR BLD: 5.9 %
HCT VFR BLD AUTO: 42.4 % (ref 36.5–49.3)
HDLC SERPL-MCNC: 33 MG/DL
HGB BLD-MCNC: 13.9 G/DL (ref 12–17)
IMM GRANULOCYTES # BLD AUTO: 0.01 THOUSAND/UL (ref 0–0.2)
IMM GRANULOCYTES NFR BLD AUTO: 0 % (ref 0–2)
LDLC SERPL CALC-MCNC: 109 MG/DL (ref 0–100)
LYMPHOCYTES # BLD AUTO: 2.18 THOUSANDS/ÂΜL (ref 0.6–4.47)
LYMPHOCYTES NFR BLD AUTO: 39 % (ref 14–44)
MCH RBC QN AUTO: 28.2 PG (ref 26.8–34.3)
MCHC RBC AUTO-ENTMCNC: 32.8 G/DL (ref 31.4–37.4)
MCV RBC AUTO: 86 FL (ref 82–98)
MICROALBUMIN UR-MCNC: 12.4 MG/L (ref 0–20)
MICROALBUMIN/CREAT 24H UR: 5 MG/G CREATININE (ref 0–30)
MONOCYTES # BLD AUTO: 0.45 THOUSAND/ÂΜL (ref 0.17–1.22)
MONOCYTES NFR BLD AUTO: 8 % (ref 4–12)
NEUTROPHILS # BLD AUTO: 2.62 THOUSANDS/ÂΜL (ref 1.85–7.62)
NEUTS SEG NFR BLD AUTO: 47 % (ref 43–75)
NRBC BLD AUTO-RTO: 0 /100 WBCS
PLATELET # BLD AUTO: 315 THOUSANDS/UL (ref 149–390)
PMV BLD AUTO: 10.3 FL (ref 8.9–12.7)
POTASSIUM SERPL-SCNC: 3.9 MMOL/L (ref 3.5–5.3)
PROT SERPL-MCNC: 7.9 G/DL (ref 6.4–8.4)
RBC # BLD AUTO: 4.93 MILLION/UL (ref 3.88–5.62)
SODIUM SERPL-SCNC: 137 MMOL/L (ref 135–147)
TRIGL SERPL-MCNC: 89 MG/DL
TSH SERPL DL<=0.05 MIU/L-ACNC: 1.12 UIU/ML (ref 0.45–4.5)
WBC # BLD AUTO: 5.57 THOUSAND/UL (ref 4.31–10.16)

## 2023-03-27 ENCOUNTER — HOSPITAL ENCOUNTER (EMERGENCY)
Facility: HOSPITAL | Age: 48
Discharge: HOME | End: 2023-03-27
Attending: EMERGENCY MEDICINE

## 2023-03-27 ENCOUNTER — APPOINTMENT (OUTPATIENT)
Dept: RADIOLOGY | Facility: MEDICAL CENTER | Age: 48
End: 2023-03-27
Attending: PHYSICIAN ASSISTANT

## 2023-03-27 ENCOUNTER — OFFICE VISIT (OUTPATIENT)
Dept: URGENT CARE | Facility: MEDICAL CENTER | Age: 48
End: 2023-03-27

## 2023-03-27 VITALS
RESPIRATION RATE: 18 BRPM | OXYGEN SATURATION: 99 % | SYSTOLIC BLOOD PRESSURE: 126 MMHG | HEART RATE: 84 BPM | DIASTOLIC BLOOD PRESSURE: 74 MMHG | TEMPERATURE: 97 F

## 2023-03-27 VITALS
TEMPERATURE: 98.2 F | RESPIRATION RATE: 18 BRPM | DIASTOLIC BLOOD PRESSURE: 83 MMHG | SYSTOLIC BLOOD PRESSURE: 150 MMHG | OXYGEN SATURATION: 97 % | HEART RATE: 68 BPM

## 2023-03-27 DIAGNOSIS — S61.011A LACERATION OF RIGHT THUMB WITHOUT DAMAGE TO NAIL, FOREIGN BODY PRESENCE UNSPECIFIED, INITIAL ENCOUNTER: ICD-10-CM

## 2023-03-27 DIAGNOSIS — S61.011A LACERATION OF RIGHT THUMB WITHOUT DAMAGE TO NAIL, FOREIGN BODY PRESENCE UNSPECIFIED, INITIAL ENCOUNTER: Primary | ICD-10-CM

## 2023-03-27 DIAGNOSIS — J22 LOWER RESPIRATORY INFECTION: ICD-10-CM

## 2023-03-27 DIAGNOSIS — S61.021A LACERATION OF RIGHT THUMB WITH FOREIGN BODY WITHOUT DAMAGE TO NAIL, INITIAL ENCOUNTER: Primary | ICD-10-CM

## 2023-03-27 RX ORDER — CEPHALEXIN 500 MG/1
500 CAPSULE ORAL EVERY 6 HOURS SCHEDULED
Qty: 28 CAPSULE | Refills: 0 | Status: SHIPPED | OUTPATIENT
Start: 2023-03-27 | End: 2023-04-03

## 2023-03-27 NOTE — PROGRESS NOTES
[facility]        NAME: Vera Jacques is a 41 y o  male  : 1975    MRN: [id number]  DATE: 2023  TIME: 7:39 PM    Assessment and Plan   Laceration of right thumb without damage to nail, foreign body presence unspecified, initial encounter [S61 011A]  1  Laceration of right thumb without damage to nail, foreign body presence unspecified, initial encounter  XR thumb right first digit-min 2v    Transfer to other facility            Patient Instructions     Tetanus given  TO ER for removal of fb  Lidocaine 1% utilized- unable to feel glass seen on Xray  Chief Complaint     Chief Complaint   Patient presents with   • Thumb Injury     Patient reports that he has a pieces of  glass stuck in his right thumb  History of Present Illness       Pt had piece of glass cut into RT dominant thumb  Has retained glass      Review of Systems   Review of Systems   All other systems reviewed and are negative          Current Medications       Current Outpatient Medications:   •  albuterol (Ventolin HFA) 90 mcg/act inhaler, Inhale 2 puffs every 6 (six) hours as needed for wheezing, Disp: 18 g, Rfl: 0  •  atorvastatin (LIPITOR) 20 mg tablet, Take 1 tablet (20 mg total) by mouth daily, Disp: 90 tablet, Rfl: 1  •  metFORMIN (GLUCOPHAGE) 1000 MG tablet, Take 1,000 mg by mouth 2 (two) times a day, Disp: , Rfl:   •  Semaglutide,0 25 or 0 5MG/DOS, (Ozempic, 0 25 or 0 5 MG/DOSE,) 2 MG/1 5ML SOPN, Inject 0 25 mg under the skin once a week, Disp: 1 5 mL, Rfl: 1  •  venlafaxine (EFFEXOR-XR) 37 5 mg 24 hr capsule, Take 37 5 mg by mouth daily, Disp: , Rfl:     Current Allergies     Allergies as of 2023   • (No Known Allergies)            The following portions of the patient's history were reviewed and updated as appropriate: allergies, current medications, past family history, past medical history, past social history, past surgical history and problem list      Past Medical History:   Diagnosis Date • Diabetes ([facility] [address] )    • Hyperlipidemia    • YURIY on CPAP        Past Surgical History:   Procedure Laterality Date   • [place] LAPS ABD PRTM&OMENTUM DX W/WO SPEC BR/WA SPX N/A 8/16/2022    Procedure: LAPAROSCOPY DIAGNOSTIC, LYSIS OF ADHESIONS, REDUCTION OF INTERNAL HERNIA;  Surgeon: Bonnie Bolivar MD;  Location: [facility];  Service: General   • VASECTOMY         Family History   Family history unknown: Yes         Medications have been verified  Objective   /74   Pulse 84   Temp (!) 97 °F (36 1 °C)   Resp 18   SpO2 99%   No LMP for male patient  Physical Exam     Physical Exam  Vitals and nursing note reviewed  Constitutional:       Appearance: Normal appearance  He is obese  Cardiovascular:      Rate and Rhythm: Normal rate and regular rhythm  Pulses: Normal pulses  Pulmonary:      Effort: Pulmonary effort is normal    Neurological:      Mental Status: He is alert           Rt thumb-laceration at tip

## 2023-03-28 RX ORDER — ALBUTEROL SULFATE 90 UG/1
AEROSOL, METERED RESPIRATORY (INHALATION)
Qty: 18 G | Refills: 1 | Status: SHIPPED | OUTPATIENT
Start: 2023-03-28

## 2023-03-28 NOTE — DISCHARGE INSTRUCTIONS
Take antibiotic as prescribed  Keep wound clean, dry, covered  Follow up with Hand  Return to ED for new or worsening symptoms as discussed

## 2023-03-28 NOTE — ED PROVIDER NOTES
History  Chief Complaint   Patient presents with   • Finger Laceration     Pt reports he has glass in right thumb, went to urgent care and they were unable to get it out  79-year-old male with past medical history of type 2 diabetes mellitus presents to emergency department for laceration of thumb  He went to urgent care following the laceration, glass foreign body was visualized on x-ray  Unsuccessful removal after local anesthetic  Tdap was updated at urgent care  Patient denies any continued bleeding  Reports pain only when pressing on it  Denies fever, chills, numbness, weakness, swelling, stiffness  Finger Laceration  Depth: Through dermis  Quality: straight    Laceration mechanism:  Broken glass  Foreign body present:  Glass  Tetanus status:  Up to date  Associated symptoms: no fever, no focal weakness, no numbness, no rash, no redness, no swelling and no streaking        Prior to Admission Medications   Prescriptions Last Dose Informant Patient Reported? Taking?    Semaglutide,0 25 or 0 5MG/DOS, (Ozempic, 0 25 or 0 5 MG/DOSE,) 2 MG/1 5ML SOPN   No No   Sig: Inject 0 25 mg under the skin once a week   atorvastatin (LIPITOR) 20 mg tablet   No No   Sig: Take 1 tablet (20 mg total) by mouth daily   metFORMIN (GLUCOPHAGE) 1000 MG tablet   Yes No   Sig: Take 1,000 mg by mouth 2 (two) times a day   venlafaxine (EFFEXOR-XR) 37 5 mg 24 hr capsule   Yes No   Sig: Take 37 5 mg by mouth daily      Facility-Administered Medications: None       Past Medical History:   Diagnosis Date   • Diabetes ([facility] [address] )    • Hyperlipidemia    • YURIY on CPAP        Past Surgical History:   Procedure Laterality Date   • CT LAPS ABD PRTM&OMENTUM DX W/WO SPEC BR/WA SPX N/A 8/16/2022    Procedure: LAPAROSCOPY DIAGNOSTIC, LYSIS OF ADHESIONS, REDUCTION OF INTERNAL HERNIA;  Surgeon: Watson Pena MD;  Location: [facility];  Service: General   • VASECTOMY         Family History   Family history unknown: Yes     I have reviewed and agree with the history as documented  E-Cigarette/Vaping     E-Cigarette/Vaping Substances     Social History     Tobacco Use   • Smoking status: Former     Packs/day: 3 00     Types: Cigarettes   • Smokeless tobacco: Never   • Tobacco comments:     8 YRS AGO   Substance Use Topics   • Alcohol use: Never   • Drug use: Never       Review of Systems   Constitutional: Negative for chills and fever  Musculoskeletal: Negative for arthralgias and joint swelling  Skin: Positive for wound  Negative for color change and rash  Neurological: Negative for focal weakness, weakness and numbness  All other systems reviewed and are negative  Physical Exam  Physical Exam  Vitals and nursing note reviewed  Cardiovascular:      Pulses:           Radial pulses are 2+ on the right side  Musculoskeletal:      Right hand: Laceration (1 cm, well-approximated no active bleeding ) present  No swelling or bony tenderness  Normal range of motion  Normal strength  Normal sensation  Normal capillary refill  Normal pulse  Hands:    Skin:     General: Skin is warm and dry  Capillary Refill: Capillary refill takes less than 2 seconds  Findings: No erythema or rash           Vital Signs  ED Triage Vitals [03/27/23 2054]   Temperature Pulse Respirations Blood Pressure SpO2   98 2 °F (36 8 °C) 68 18 150/83 97 %      Temp Source Heart Rate Source Patient Position - Orthostatic VS BP Location FiO2 (%)   Oral Monitor Sitting Left arm --      Pain Score       --           Vitals:    03/27/23 2054   BP: 150/83   Pulse: 68   Patient Position - Orthostatic VS: Sitting         Visual Acuity      ED Medications  Medications - No data to display    Diagnostic Studies  Results Reviewed     None                 No orders to display              Procedures  Foreign Body - Embedded    Date/Time: 3/27/2023 9:45 PM  Performed by: Eric Lanier PA-C  Authorized by: Erci Lanier PA-C     Patient location:  ED  Other Assisting Provider: No    Consent:     Consent obtained:  Verbal    Consent given by:  Patient    Risks discussed:  Bleeding, infection, incomplete removal, nerve damage, pain, poor cosmetic result and worsening of condition    Alternatives discussed:  No treatment, observation, referral, alternative treatment and delayed treatment  Universal protocol:     Patient identity confirmed:  Verbally with patient  Location:     Location:  Finger    Finger location:  R thumb    Depth:  Subcutaneous    Tendon involvement:  None  Pre-procedure details:     Imaging:  X-ray (xray images from Urgent care reviewed, showing foreign body)    Neurovascular status: intact      Preparation: Patient was prepped and draped in usual sterile fashion    Procedure details:     Removal mechanism:  Irrigation and forceps    Method: through laceration     Foreign bodies recovered:  None    Intact foreign body removal: no    Post-procedure details:     Neurovascular status: intact      Skin closure:  None    Dressing:  Sterile dressing    Patient tolerance of procedure: Tolerated with difficulty    Complication (if applicable): Unable to remove foreign body   Comments:      Patient refused local anesthetic             ED Course  ED Course as of 03/29/23 1832   Mon Mar 27, 2023   2113 Tdap done at urgent care  Was working on refrigerator at [employer] thinks there was a piece of glass  Small 1 cm nongaping wound R thumb  Is R handed  No numbness or weakness  Perfusion intact  No active bleeding  2143 Unable to successfully remove                                              Medical Decision Making  Patient presenting for foreign body removal after laceration to right thumb  Via chart review, x-ray from urgent care earlier today's showing foreign body right thumb  Was sent to ED from urgent care after unsuccessful removal of foreign body  Tdap was updated at urgent care  Wound thoroughly cleansed again here    Range of motion, sensation, "distal perfusion all intact  Patient refused repeat local anesthesia at this time  Attempted removal with irrigation, forceps unsuccessful, tolerated with difficulty secondary to refusal of anesthetic  Wound not requiring primary closure as it is well approximated, no active bleeding  Patient educated on continued wound care, prophylactic antibiotic use, outpatient follow-up  All imaging and/or lab testing discussed with patient, strict return to ED precautions discussed  Patient recommended to follow up promptly with appropriate outpatient provider  Patient and/or family members verbalizes understanding and agrees with plan  Patient and/or family members were given opportunity to ask questions, all questions were answered at this time  Patient is stable for discharge      Portions of the record may have been created with voice recognition software  Occasional wrong word or \"sound a like\" substitutions may have occurred due to the inherent limitations of voice recognition software  Read the chart carefully and recognize, using context, where substitutions have occurred  Risk  Prescription drug management  Disposition  Final diagnoses:   Laceration of right thumb with foreign body without damage to nail, initial encounter     Time reflects when diagnosis was documented in both MDM as applicable and the Disposition within this note     Time User Action Codes Description Comment    3/27/2023 10:01 PM Mirtha Melo Add [A35 253F] Laceration of right thumb with foreign body without damage to nail, initial encounter       ED Disposition     ED Disposition   Discharge    Condition   Stable    Date/Time   Mon Mar 27, 2023 10:02 PM    Comment   Les Kuo discharge to home/self care                 Follow-up Information     Follow up With Specialties Details Why Contact Gladis Friend MD Plastic Surgery Schedule an appointment as soon as possible for a visit  for follow up [address] [facility],  [address]  [phone]            Discharge Medication List as of 3/27/2023 10:04 PM      START taking these medications    Details   cephalexin (KEFLEX) 500 mg capsule Take 1 capsule (500 mg total) by mouth every 6 (six) hours for 7 days, Starting Mon 3/27/2023, Until Mon 4/3/2023, Normal         CONTINUE these medications which have NOT CHANGED    Details   atorvastatin (LIPITOR) 20 mg tablet Take 1 tablet (20 mg total) by mouth daily, Starting Tue 11/1/2022, Normal      metFORMIN (GLUCOPHAGE) 1000 MG tablet Take 1,000 mg by mouth 2 (two) times a day, Starting Mon 6/13/2022, Historical Med      Semaglutide,0 25 or 0 5MG/DOS, (Ozempic, 0 25 or 0 5 MG/DOSE,) 2 MG/1 5ML SOPN Inject 0 25 mg under the skin once a week, Starting Wed 12/21/2022, Normal      venlafaxine (EFFEXOR-XR) 37 5 mg 24 hr capsule Take 37 5 mg by mouth daily, Starting Fri 11/26/2021, Historical Med      albuterol (Ventolin HFA) 90 mcg/act inhaler Inhale 2 puffs every 6 (six) hours as needed for wheezing, Starting Fri 3/10/2023, Normal             No discharge procedures on file      PDMP Review       Value Time User    PDMP Reviewed  Yes 8/17/2022  7:41 AM Neo Prather PA-C          ED Provider  Electronically Signed by           Lexa Sanchez PA-C  03/29/23 0038

## 2023-04-05 ENCOUNTER — OFFICE VISIT (OUTPATIENT)
Dept: GASTROENTEROLOGY | Facility: MEDICAL CENTER | Age: 48
End: 2023-04-05

## 2023-04-05 VITALS
SYSTOLIC BLOOD PRESSURE: 109 MMHG | HEART RATE: 68 BPM | DIASTOLIC BLOOD PRESSURE: 68 MMHG | BODY MASS INDEX: 37.15 KG/M2 | TEMPERATURE: 96.4 F | WEIGHT: 253.4 LBS

## 2023-04-05 DIAGNOSIS — Z12.11 SCREENING FOR COLON CANCER: Primary | ICD-10-CM

## 2023-04-05 NOTE — PATIENT INSTRUCTIONS
High Fiber Diet   AMBULATORY CARE:   A high-fiber diet  includes foods that have a high amount of fiber  Fiber is the part of fruits, vegetables, and grains that is not broken down by your body  Fiber keeps your bowel movements regular  Fiber can also help lower your cholesterol level, control blood sugar in people with diabetes, and relieve constipation  Fiber can also help you control your weight because it helps you feel full faster  Most adults should eat 25 to 35 grams of fiber each day  Talk to your dietitian or healthcare provider about the amount of fiber you need  Good sources of fiber:       Foods with at least 4 grams of fiber per serving:      ? to ½ cup of high-fiber cereal (check the nutrition label on the box)    ½ cup of blackberries or raspberries    4 dried prunes    1 cooked artichoke    ½ cup of cooked legumes, such as lentils, or red, kidney, and alicia beans    Foods with 1 to 3 grams of fiber per servin slice of whole-wheat, pumpernickel, or rye bread    ½ cup of cooked brown rice    4 whole-wheat crackers    1 cup of oatmeal    ½ cup of cereal with 1 to 3 grams of fiber per serving (check the nutrition label on the box)    1 small piece of fruit, such as an apple, banana, pear, kiwi, or orange    3 dates    ½ cup of canned apricots, fruit cocktail, peaches, or pears    ½ cup of raw or cooked vegetables, such as carrots, cauliflower, cabbage, spinach, squash, or corn  Ways that you can increase fiber in your diet:   Choose brown or wild rice instead of white rice  Use whole wheat flour in recipes instead of white or all-purpose flour  Add beans and peas to casseroles or soups  Choose fresh fruit and vegetables with peels or skins on instead of juices  Other diet guidelines to follow: Add fiber to your diet slowly  You may have abdominal discomfort, bloating, and gas if you add fiber to your diet too quickly  Drink plenty of liquids as you add fiber to your diet  You may have nausea or develop constipation if you do not drink enough water  Ask how much liquid to drink each day and which liquids are best for you  © Copyright [facility] 2022 Information is for End User's use only and may not be sold, redistributed or otherwise used for commercial purposes  The above information is an  only  It is not intended as medical advice for individual conditions or treatments  Talk to your doctor, nurse or pharmacist before following any medical regimen to see if it is safe and effective for you        Scheduled date of Colon (as of today) 5/19/23  Physician performing: Dr Jack Mckeon  Location of procedure:  [address] [place] [address]  Instructions given to patient:  miralax/dulcolax  Clearances: na

## 2023-04-05 NOTE — PROGRESS NOTES
[facility] - Outpatient Consultation  Vikas Grace 41 y o  male MRN: [id number]  Encounter: [id number]          ASSESSMENT AND PLAN:   51-year-old male with history of type 2 diabetes, YURIY, hyperlipidemia who presents for evaluation    1  Screening for colon cancer  He presents today for colon cancer screening  He is currently without GI complaints  He is average risk and has no family history of colon cancer  I discussed the indication, risk and benefit of colonoscopy with him today and he is agreeable to proceed  - Colonoscopy; Future        ______________________________________________________________________    HPI: 51-year-old male with history of type 2 diabetes, YURIY, hyperlipidemia who presents for evaluation    He is currently without GI complaints reports regular, formed bowel movement usually every other day  He denies melena or hematochezia  His appetite is good his weight is stable  He has no symptoms of gastroesophageal reflux and does not use acid suppression chronically  He reports no family history of colon cancer   He takes no antiplatelet or anticoagulant medications    2016 colonoscopy had a 3 mm rectal polyp and internal hemorrhoids  Pathology is not available for review      November 2022 CT abdomen pelvis showed nonobstructing renal stones otherwise unremarkable    March 2023 liver enzymes within normal limits, hemoglobin 13 9, hemoglobin A1c 5 9    Past surgical history includes internal hernia repair in the setting of a bowel obstruction, lysis of adhesions        REVIEW OF SYSTEMS:    CONSTITUTIONAL: Denies any fever, chills, rigors, and weight loss  HEENT: No earache or tinnitus  Denies hearing loss or visual disturbances  CARDIOVASCULAR: No chest pain or palpitations  RESPIRATORY: Denies any cough, hemoptysis, shortness of breath or dyspnea on exertion  GASTROINTESTINAL: As noted in the History of Present Illness     GENITOURINARY: No problems with urination  Denies any hematuria or dysuria  NEUROLOGIC: No dizziness or vertigo, denies headaches  MUSCULOSKELETAL: Denies any muscle or joint pain  SKIN: Denies skin rashes or itching  ENDOCRINE: Denies excessive thirst  Denies intolerance to heat or cold  PSYCHOSOCIAL: Denies depression or anxiety  Denies any recent memory loss  Historical Information   Past Medical History:   Diagnosis Date   • Diabetes ([address] )    • Hyperlipidemia    • YURIY on CPAP      Past Surgical History:   Procedure Laterality Date   • [place] LAPS ABD PRTM&OMENTUM DX W/WO SPEC BR/WA SPX N/A 8/16/2022    Procedure: LAPAROSCOPY DIAGNOSTIC, LYSIS OF ADHESIONS, REDUCTION OF INTERNAL HERNIA;  Surgeon: Florina Lord MD;  Location: [facility];  Service: General   • VASECTOMY       Social History   Social History     Substance and Sexual Activity   Alcohol Use Never     Social History     Substance and Sexual Activity   Drug Use Never     Social History     Tobacco Use   Smoking Status Former   • Packs/day: 3 00   • Types: Cigarettes   Smokeless Tobacco Never   Tobacco Comments    8 YRS AGO     Family History   Family history unknown: Yes       Meds/Allergies       Current Outpatient Medications:   •  albuterol (PROVENTIL HFA,VENTOLIN HFA) 90 mcg/act inhaler  •  atorvastatin (LIPITOR) 20 mg tablet  •  metFORMIN (GLUCOPHAGE) 1000 MG tablet  •  Semaglutide,0 25 or 0 5MG/DOS, (Ozempic, 0 25 or 0 5 MG/DOSE,) 2 MG/1 5ML SOPN  •  venlafaxine (EFFEXOR-XR) 37 5 mg 24 hr capsule    No Known Allergies        Objective     Blood pressure 109/68, pulse 68, temperature (!) 96 4 °F (35 8 °C), temperature source Tympanic, weight 115 kg (253 lb 6 4 oz)  There is no height or weight on file to calculate BMI  PHYSICAL EXAM:      General Appearance:   Alert, cooperative, no distress   HEENT:   Normocephalic, atraumatic, anicteric       Neck:  Supple, symmetrical, trachea midline   Lungs:   Clear to auscultation bilaterally; no rales, rhonchi or wheezing; respirations unlabored    Heart[de-identified]   Regular rate and rhythm; no murmur, rub, or gallop  Abdomen:   Soft, non-tender, non-distended; normal bowel sounds; no masses, no organomegaly    Genitalia:   Deferred    Rectal:   Deferred    Extremities:  No cyanosis, clubbing or edema    Pulses:  2+ and symmetric    Skin:  No jaundice, rashes, or lesions    Lymph nodes:  No palpable cervical lymphadenopathy        Lab Results:   No visits with results within 1 Day(s) from this visit  Latest known visit with results is:   Appointment on 03/11/2023   Component Date Value   • Hemoglobin A1C 03/11/2023 5 9 (H)    • EAG 03/11/2023 123    • Sodium 03/11/2023 137    • Potassium 03/11/2023 3 9    • Chloride 03/11/2023 104    • CO2 03/11/2023 29    • ANION GAP 03/11/2023 4    • BUN 03/11/2023 15    • Creatinine 03/11/2023 0 95    • Glucose, Fasting 03/11/2023 98    • Calcium 03/11/2023 9 6    • AST 03/11/2023 17    • ALT 03/11/2023 42    • Alkaline Phosphatase 03/11/2023 101    • Total Protein 03/11/2023 7 9    • Albumin 03/11/2023 4 2    • Total Bilirubin 03/11/2023 0 38    • eGFR 03/11/2023 96    • WBC 03/11/2023 5 57    • RBC 03/11/2023 4 93    • Hemoglobin 03/11/2023 13 9    • Hematocrit 03/11/2023 42 4    • MCV 03/11/2023 86    • MCH 03/11/2023 28 2    • MCHC 03/11/2023 32 8    • RDW 03/11/2023 12 6    • MPV 03/11/2023 10 3    • Platelets 70/39/1471 315    • nRBC 03/11/2023 0    • Neutrophils Relative 03/11/2023 47    • Immat GRANS % 03/11/2023 0    • Lymphocytes Relative 03/11/2023 39    • Monocytes Relative 03/11/2023 8    • Eosinophils Relative 03/11/2023 5    • Basophils Relative 03/11/2023 1    • Neutrophils Absolute 03/11/2023 2 62    • Immature Grans Absolute 03/11/2023 0 01    • Lymphocytes Absolute 03/11/2023 2 18    • Monocytes Absolute 03/11/2023 0 45    • Eosinophils Absolute 03/11/2023 0 27    • Basophils Absolute 03/11/2023 0 04    • Creatinine, Ur 03/11/2023 269 0    • Microalbum  ,Alyson Saint 03/11/2023 12 4    • Microalb Creat Ratio 03/11/2023 5    • Cholesterol 03/11/2023 160    • Triglycerides 03/11/2023 89    • HDL, Direct 03/11/2023 33 (L)    • LDL Calculated 03/11/2023 109 (H)    • TSH 3RD GENERATON 03/11/2023 1 120          Radiology Results:   XR chest pa & lateral    Result Date: 3/13/2023  Narrative: CHEST INDICATION:   J22: Unspecified acute lower respiratory infection  COMPARISON:  None EXAM PERFORMED/VIEWS:  XR CHEST PA & LATERAL FINDINGS: Cardiomediastinal silhouette appears unremarkable  The lungs are clear  No pneumothorax or pleural effusion  Osseous structures appear within normal limits for patient age  Impression: No acute findings  Workstation performed: [id number]     XR thumb right first digit-min 2v    Result Date: 3/28/2023  Narrative: RIGHT FINGER INDICATION:   S61 011A: Laceration without foreign body of right thumb without damage to nail, initial encounter  COMPARISON:  None VIEWS:  XR THUMB RIGHT FIRST DIGIT-MIN 2V For the purposes of institution wide universal language the following terms will apply: (thumb=1st digit/finger, index finger=2nd digit/finger, long finger=3rd digit/finger, ring=4th digit/finger and small finger=5th digit/finger) FINDINGS: There is no acute fracture or dislocation  No significant degenerative changes  No lytic or blastic osseous lesion  3 mm radiopaque foreign body is noted within the tuft of the 1st digit  Impression: No acute osseous abnormality  Distal 1st digit foreign body   Workstation performed: [id number]

## 2023-04-20 NOTE — PROGRESS NOTES
Bariatric Behavioral Health Evaluation  Presenting Problem:  Carine Charles  is a 41 y o    male   :  1975   Presents for a bariatric evaluation for surgery  Patient has history weight loss attempts and has struggled with success  Is the patient seeking Bariatric Surgery Eval?  yes  The pt reported that he is interested in bariatric surgery because he has tried diets, exercise and MWM but feels that none have been effective  The pt will feel hungry one to two hours after his meal  He reports knowing how to eat healthy but unable to feel full  The pt's mother had bariatric surgery  Research? yes     Realizes Post- Op Requirements? Yes but will learn more through the program      Pre-morbid level of function and history of present illness: (any kind of medical conditions): type 2 diabetes, YURIY-uses CPAP, Hypotension, Hyperlipidemia     Support system: spouse and ex-wife  Living situation: spouse and two year old son    Work: FT self employed preventative maintenance  for [employer]  Physical Activity:  None- plans on joining [employer]  Current physical limitations: sciatic/lower back pain    Family hx of obesity: Mother, grandmother, half sister and cousin  The pt grew up with his mother, step dad and three half sisters   Reports traditions-,  rules/routines around food continued to adulthood: fast food     Current unhealthy eating habits: drinks soda, enjoys candy  2-3 meals a day with no portion control and reports an hour later is still hungry  Mindless eating: yes  Stress eating: sometimes  Emotional eating: no     Mental Health, Trauma and Substance use Assessment    Psychiatric/Psychological Treatment Diagnosis: The pt is in agreement with dx of Anxiety, Attention deficit Disorder without hyperactivity, Mild episode of recurrent Major Depressive dx  Outpatient Counselor: seen a therapist 26 yrs ago     Patient educated on the benefits of outpatient therapy to develop positive coping skills and habits for success long term, resource list provided  Psychiatrist: no  MH medication provided by his provider  Have you had any Mental Health in-pt admissions? no    History of Eating Disorders: no    Family History-Drug or alcohol hx: no  Have you had any Drug and/or Alcohol use and treatment history:no  Have you had any Substance Use Treatment? No     Tobacco/Vaping History: no     Domestic Violence: no     Abuse or Trauma History: no    Additional comments/stressors related to family/relationships/peer support: Patient identifies spouse and ex wife  as his support  Patient identifies financial  as current stressors  Risk Assessment    Supports: reported to be intact  Risk of harm to self or others: (SI/HI) none noted during evaluation  No HI/SI        Presence of Audio/Visual Hallucinations: no     Access to weapons: not reported during interview  Observation: This interview only (SW and RD will continue working with the patient throughout the program)  Based on the previous information, the client presents the following risk of harm to self or others: low      Physical/Mental Health Status:     Appearance: appropriate  Sociability: average  Affect: appropriate  Mood: calm  Thought Process: coherent  Speech: normal  Content: no impairment  Orientation: person  Yes , place  Yes , and time  Yes   Insight: emotional  good    BARIATRIC SURGERY EDUCATION CHECKLIST    Patient has received the following education related to the bariatric surgery process and understands:    Patients may be required to complete a psychiatric evaluation and receive clearance for surgery from mental health provider  Patients who undergo weight loss surgery are at higher risk of increased mental health concerns and suicide attempts  Patients may be required to complete a full substance abuse evaluation and then complete all treatment recommendations prior to surgery      If diagnosis of abuse/dependence results, patient may be required to remain sober for one (1) year before having bariatric surgery  Patients on psychiatric medications should check with their provider to discuss psychiatric medications and the changes in absorption  Patient should discuss all time release medications with provider and take all medications as prescribed  The recommendation is that there is no use of any tobacco products, Hookah or vape's for the bariatric post-operation patient  Bariatric surgery patients should not consume alcohol as a post-operative patient as it may increase risk of numerous health conditions including but not limited to alcohol abuse and ulcers  There is a possibility of weight regain if patient does not follow all program guidelines and recommendations  Bariatric surgery patients should exercise thirty (30) to sixty (60) minutes per day to maintain post-surgical weight loss  Research indicates that bariatric patients are more successful when they see a therapist for up to two (2) years post-op  Patients will follow all medical and dietary recommendations provided  Patient will keep all scheduled appointments and follow up with their physician for a minimum of five (5) years  Patient will take all vitamins as recommended  Post-operative vitamins are life-long  There is a goal month set  All requirements should be met by this time  Don't wait to get started! There is a deadline month set  All requirements must be finished by this time and if not, the patient will be halted in the surgery process  The patient can be referred to the medical weight management program or can come back to the surgical program once the unfinished tasks from the previous program are completed  Female patients of childbearing years are informed that pregnancy is not recommended until 12 months post-op        Recommendations: Recommended for surgery  yes         Note :  Patient presented for behavioral health evaluation for the bariatric program   Positive dx of Mental Health  The pt is in agreement with dx of Anxiety, Attention deficit Disorder without hyperactivity, Mild episode of recurrent Major Depressive dx  Hx of  therapy  No hx of  Psychiatry  No hx of  Drug and/or Alcohol abuse or treatment  No  tobacco use  Patient educated regarding the impact of nicotine and alcohol on the post surgery bariatric patient  Patient has no  family history of tobacco and alcohol addiction  Patient meets criteria for surgery at this time and is referred to the bariatric surgeon  Goal: 1-increase activity level  2- Continue to use  CPAP daily  3-Mindful eating (eating slow)  Next Appointment: 5/12/23 Dr Logn Pinto Worker: Cony Dials M S W  L S W

## 2023-04-25 ENCOUNTER — PREP FOR PROCEDURE (OUTPATIENT)
Dept: GASTROENTEROLOGY | Facility: MEDICAL CENTER | Age: 48
End: 2023-04-25

## 2023-04-25 ENCOUNTER — OFFICE VISIT (OUTPATIENT)
Dept: BARIATRICS | Facility: CLINIC | Age: 48
End: 2023-04-25

## 2023-04-25 ENCOUNTER — TELEPHONE (OUTPATIENT)
Dept: GASTROENTEROLOGY | Facility: MEDICAL CENTER | Age: 48
End: 2023-04-25

## 2023-04-25 VITALS
DIASTOLIC BLOOD PRESSURE: 82 MMHG | WEIGHT: 259.5 LBS | HEIGHT: 69 IN | BODY MASS INDEX: 38.44 KG/M2 | HEART RATE: 76 BPM | SYSTOLIC BLOOD PRESSURE: 118 MMHG | TEMPERATURE: 97.6 F

## 2023-04-25 DIAGNOSIS — E66.9 OBESITY (BMI 35.0-39.9 WITHOUT COMORBIDITY): ICD-10-CM

## 2023-04-25 DIAGNOSIS — E66.9 CLASS 2 OBESITY WITH BODY MASS INDEX (BMI) OF 38.0 TO 38.9 IN ADULT, UNSPECIFIED OBESITY TYPE, UNSPECIFIED WHETHER SERIOUS COMORBIDITY PRESENT: Primary | ICD-10-CM

## 2023-04-25 DIAGNOSIS — E66.01 CLASS 2 SEVERE OBESITY DUE TO EXCESS CALORIES WITH SERIOUS COMORBIDITY AND BODY MASS INDEX (BMI) OF 37.0 TO 37.9 IN ADULT (HCC): Primary | ICD-10-CM

## 2023-04-25 DIAGNOSIS — G47.33 OBSTRUCTIVE SLEEP APNEA: ICD-10-CM

## 2023-04-25 DIAGNOSIS — K21.9 GASTROESOPHAGEAL REFLUX DISEASE, UNSPECIFIED WHETHER ESOPHAGITIS PRESENT: Primary | ICD-10-CM

## 2023-04-25 DIAGNOSIS — E11.9 TYPE 2 DIABETES MELLITUS WITHOUT COMPLICATION, WITHOUT LONG-TERM CURRENT USE OF INSULIN (HCC): ICD-10-CM

## 2023-04-25 NOTE — TELEPHONE ENCOUNTER
Patient call asking if it is possible to add a EGD on his appt 05/19   He was in weight management and they recommended a EGD for a H pylori culture

## 2023-04-25 NOTE — PROGRESS NOTES
"Bariatric Nutrition Assessment Note    Type of surgery    Preop 6 months required  Surgery Date: Tentative November-December 2023  Deadline March 2024  Surgeon: Dr Ricardo Simon  57 y o   male     Wt with BMI of 25: 170 29lbs  Pre-Op Excess Wt: 89 21lbs  /82   Pulse 76   Temp 97 6 °F (36 4 °C) (Tympanic)   Ht 5' 9 2\" (1 758 m)   Wt 118 kg (259 lb 8 oz)   BMI 38 10 kg/m²      NAFLD Fibrosis Score is: -1 728= No Hepatology required  NAFLD Score Correlated Fibrosis Severity   <-1 455 F0-F2   -1 455-0 676 Indeterminate Score   >0 676 F3-F4   **Fibrosis Severity Scale: F0 = no fibrosis; F1= mild fibrosis; F2 = moderate fibrosis; F3 = severe fibrosis; F4 = cirrhosis    NAFLD Score Component Values:  Component Value Date   Age: 57 y o      BMI: 38 1 kg/m²    IFG or DM: Yes    AST: 17 U/L 3/11/2023   ALT: 42 U/L 3/11/2023   Platelet: 884 Thousands/uL 3/11/2023   Albumin: 4 2 g/dL 3/11/2023     [place] Equation:     Weight Maintenance= 2464kcal/day  Estimated calories for weight loss 1464-1964kcal/day ( 1-2# per wk wt loss - sedentary )  Estimated protein needs 77 4-116g/day (1 0-1 5 gms/kg IBW )   Estimated fluid needs 2322-2709ml/day (30-35 ml/kg IBW )      Weight History   Onset of Obesity: Adult-about 10 years ago when he stopped working at a very physically demanding job  Family history of obesity: Yes: mother had RNY bariatric surgery, passed away one year later    Wt Loss Attempts: Counseling with  MD  Exercise  OTC meds/supplements  Self Created Diets (Portion Control, Healthy Food Choices, etc )  Patient has tried the above for 6 months or more with insufficient weight loss or weight regain, which is why patient has requested to be evaluated for weight loss surgery today  Maximum Wt Lost: [employer] exercise and supplements- got down to 230lbs    Review of History and Medications   Past Medical History:   Diagnosis Date   • Diabetes ([facility] [address] )    • " Hyperlipidemia    • YURIY on CPAP      Past Surgical History:   Procedure Laterality Date   • [place] LAPS ABD PRTM&OMENTUM DX W/WO SPEC BR/WA SPX N/A 8/16/2022    Procedure: LAPAROSCOPY DIAGNOSTIC, LYSIS OF ADHESIONS, REDUCTION OF INTERNAL HERNIA;  Surgeon: Juan Herrera MD;  Location: [facility];  Service: General   • VASECTOMY       Social History     Socioeconomic History   • Marital status: /Civil Union     Spouse name: None   • Number of children: None   • Years of education: None   • Highest education level: None   Occupational History   • None   Tobacco Use   • Smoking status: Former     Packs/day: 3 00     Types: Cigarettes   • Smokeless tobacco: Never   • Tobacco comments:     8 YRS AGO   Substance and Sexual Activity   • Alcohol use: Never   • Drug use: Never   • Sexual activity: Yes   Other Topics Concern   • None   Social History Narrative   • None     Social Determinants of Health     Financial Resource Strain: Not on file   Food Insecurity: Not on file   Transportation Needs: Not on file   Physical Activity: Not on file   Stress: Not on file   Social Connections: Not on file   Intimate Partner Violence: Not on file   Housing Stability: Not on file       Current Outpatient Medications:   •  atorvastatin (LIPITOR) 20 mg tablet, Take 1 tablet (20 mg total) by mouth daily, Disp: 90 tablet, Rfl: 1  •  metFORMIN (GLUCOPHAGE) 1000 MG tablet, Take 1,000 mg by mouth 2 (two) times a day, Disp: , Rfl:   •  venlafaxine (EFFEXOR-XR) 75 mg 24 hr capsule, Take 1 capsule (75 mg total) by mouth daily with breakfast, Disp: 30 capsule, Rfl: 5  •  albuterol (PROVENTIL HFA,VENTOLIN HFA) 90 mcg/act inhaler, INHALE 2 PUFFS EVERY 6 HOURS AS NEEDED FOR WHEEZING (Patient not taking: Reported on 4/25/2023), Disp: 18 g, Rfl: 1  •  amoxicillin-clavulanate (Augmentin) 875-125 mg per tablet, Take 1 tablet by mouth every 12 (twelve) hours for 10 days (Patient not taking: Reported on 4/25/2023), Disp: 20 tablet, Rfl: 0     Food "Intake and Lifestyle Assessment   Food Intake Assessment completed via usual diet recall  Currently following an intermiitent fasting type schedule: eats between noon to 8pm  Works 7am to [employer]: skips  Snack: none   Lunch: noon: on the road: restaurant- [employer] or [employer]  Snack: maybe chips or ice cream  Dinner: on the road: same as lunch  Snack: bowl of ice cream most nights  Beverage intake: If dieting: water and unsweet tea  If not dieting: sweet tea and regular soda  Protein supplement: dislikes- gives gas/bloating  Estimated protein intake per day: difficult to assess with current food recall  Estimated fluid intake per day: 1 gallon of water per day \"if dieting\"  Meals eaten away from home: all of them=14 per week  Typical meal pattern: 2 meals per day and 2 snacks per day  Eating Behaviors: Consumption of high calorie/ high fat foods, Consumption of high calorie beverages, Large portion sizes, Craves sweet foods: reports ice cream is his favorite  Food allergies or intolerances: No Known Allergies FA  Cultural or Mu-ism considerations: none noted    Physical Assessment  Physical Activity  Types of exercise: None- plans on joining [employer]  Current physical limitations: sciatic/lower back pain    Psychosocial Assessment   Support systems: lives with spouse- is supportive  Socioeconomic factors: on the road a lot for work  His 22yo son will be taking over his job while he is recovering from his bariatric surgery      Nutrition Diagnosis  Diagnosis: Overweight / Obesity (NC-3 3), Excessive energy intake (NI-1 5), Inappropriate intake of carbohydrates (NI-5 8 3) and Undesirable food choices (NB-1 7)  Related to: Limited adherence to nutrition-related recommendations, Physical inactivity and Excessive energy intake  As Evidenced by: BMI >25, Excessive energy intake and Unintentional weight gain     Nutrition Prescription: Recommend the following diet  Low fat, Low sugar, High protein " "and Regular    Interventions and Teaching   Discussed pre-op and post-op nutrition guidelines  Patient educated and handouts provided  Surgical changes to stomach / GI  Capacity of post-surgery stomach  Diet progression  Adequate hydration  Sugar and fat restriction to decrease \"dumping syndrome\"  Expected weight loss  Weight loss plateaus/ possibility of weight regain  Exercise  Suggestions for pre-op diet  Nutrition considerations after surgery  Protein supplements  Meal planning and preparation  Appropriate carbohydrate, protein, and fat intake, and food/fluid choices to maximize safe weight loss, nutrient intake, and tolerance   Dietary and lifestyle changes  Possible problems with poor eating habits  Techniques for self monitoring and keeping daily food journal  Potential for food intolerance after surgery, and ways to deal with them including: lactose intolerance, nausea, reflux, vomiting, diarrhea, food intolerance, appetite changes, gas  Vitamin / Mineral supplementation of Multivitamin with minerals and Vitamin D  Pt reports he is currently taking a One-a-day multivitamin    Education provided to: patient  Barriers to learning: No barriers identified  Readiness to change: preparation  Prior research on procedure: discussed with provider, pre-op class and friends or family  Comprehension: needs reinforcement and verbalizes understanding   Expected Compliance: good    Recommendations  Pt is an appropriate candidate for surgery  Yes  5% wt loss=13#=Day of surgery goal of 246 5#  Minimum BMI of 02=489 41lbs  Instructed pt that if he is already close to a BMI of 35 by the time he gets to his pre-op class, he will not do two-week pre-op liquid diet  3/11/2023: TSH, Lipid panel, CBC, CMP, HgbA1c done  Will need updated CBC, CMP, HgbA1c after 9/11/23      Evaluation / Monitoring  Dietitian to Monitor: Eating pattern as discussed Tolerance of nutrition prescription Body weight Lab values Physical activity " Bowel pattern    Goals  Eliminate sugar sweetened beverages, Food journal, Exercise 30 minutes 5 times per week, Complete lession plans 1-6, Eat 3 meals per day and Eliminate mindless snacking    Time Spent:   1 Hour

## 2023-05-09 PROBLEM — J01.90 ACUTE SINUSITIS WITH SYMPTOMS > 10 DAYS: Status: RESOLVED | Noted: 2023-03-10 | Resolved: 2023-05-09

## 2023-05-12 ENCOUNTER — OFFICE VISIT (OUTPATIENT)
Dept: BARIATRICS | Facility: CLINIC | Age: 48
End: 2023-05-12

## 2023-05-12 VITALS
SYSTOLIC BLOOD PRESSURE: 120 MMHG | DIASTOLIC BLOOD PRESSURE: 74 MMHG | TEMPERATURE: 98.8 F | WEIGHT: 257.5 LBS | HEIGHT: 69 IN | BODY MASS INDEX: 38.14 KG/M2 | HEART RATE: 84 BPM

## 2023-05-12 DIAGNOSIS — E66.01 MORBID (SEVERE) OBESITY DUE TO EXCESS CALORIES (HCC): Primary | ICD-10-CM

## 2023-05-12 NOTE — PROGRESS NOTES
"    BARIATRIC CONSULT-INITIAL - BARIATRIC SURGERY  Shyanne Colin 57 y o  male MRN: [id number]  Unit/Bed#:  Encounter: [id number]      HPI:  Shyanne Colin is a 57 y o  male who presents with morbid obesity to discuss weight loss options  Review of Systems    Historical Information   Past Medical History:   Diagnosis Date   • Diabetes ([facility] [address] )    • Hyperlipidemia    • YURIY on CPAP      Past Surgical History:   Procedure Laterality Date   • DE LAPS ABD PRTM&OMENTUM DX W/WO SPEC BR/WA SPX N/A 8/16/2022    Procedure: LAPAROSCOPY DIAGNOSTIC, LYSIS OF ADHESIONS, REDUCTION OF INTERNAL HERNIA;  Surgeon: Dnaica Bowens MD;  Location: [facility];  Service: General   • VASECTOMY       Social History   Social History     Substance and Sexual Activity   Alcohol Use Never     Social History     Substance and Sexual Activity   Drug Use Never     Social History     Tobacco Use   Smoking Status Former   • Packs/day: 3 00   • Types: Cigarettes   Smokeless Tobacco Never   Tobacco Comments    8 YRS AGO     Family History: non-contributory    Meds/Allergies   all medications and allergies reviewed  No Known Allergies    Objective       Current Vitals:   Blood Pressure: 120/74 (05/12/23 1507)  Pulse: 84 (05/12/23 1507)  Temperature: 98 8 °F (37 1 °C) (05/12/23 1507)  Height: 5' 9 2\" (175 8 cm) (05/12/23 1507)  Weight - Scale: 117 kg (257 lb 8 oz) (05/12/23 1507)  Body mass index is 37 81 kg/m²  Invasive Devices     None                 Physical Exam    Lab Results: I have personally reviewed pertinent lab results  Imaging: I have personally reviewed pertinent reports  EKG, Pathology, and Other Studies: I have personally reviewed pertinent reports  Code Status: [unfilled]  Advance Directive and Living Will:      Power of :    POLST:      Assessment/PLAN:            Patient has a long history of morbid obesity and is presenting to discuss the surgical weight loss options   Despite the patient best efforts " patient was unable to lose any meaningful or sustainable weight using nonsurgical means  We had a long discussion regarding all the surgical weight-loss options at our disposal at this point and reviewed the risks and benefits of each procedure in details as it relates to her age, BMI and medical conditions  Patient elected to undergo Sleeve gastrectomy    Risks and benefits were explained to the patient  We also discussed the importance and need of a preoperative workup to make sure that the patient can undergo the procedure safely  Preoperative workup includes sleep apnea screening, cardiac evaluation, nutrition/psych and preoperative EGD  Risks and benefits of all the preoperative diagnostic tests were discussed with the patient including but not limited to the upper endoscopy  Alternatives to surgery and alternative forms of surgery were also explained  Postsurgical commitment and aftercare programs were discussed and explained to the patient in details   In terms of comorbidities patient suffers mostly of   Past Medical History:   Diagnosis Date   • Diabetes ([facility] [address] )    • Hyperlipidemia    • YURIY on CPAP        I informed the patient that the rate of resolution of comorbid conditions following weight loss surgery is between 60 and 90% depending on the severity of the specific medical condition  I discussed and educated the patient regarding the different components of our multidisciplinary program and the importance of compliance and follow-up in the postoperative period  All questions answered  Patient understands risks and benefits  An image of the procedure was also shown to the patient  After showing the image we discussed all the technical aspects of the procedure and also the potential complications including but not limited to gastrointestinal perforation, leak, obstruction, stricture and hemorrhage   I spent 30 min with the patient more than 50% of the time was spent educating the patient and coordinating care

## 2023-05-17 RX ORDER — SODIUM CHLORIDE 9 MG/ML
125 INJECTION, SOLUTION INTRAVENOUS CONTINUOUS
Status: CANCELLED | OUTPATIENT
Start: 2023-05-17

## 2023-05-19 ENCOUNTER — HOSPITAL ENCOUNTER (OUTPATIENT)
Dept: GASTROENTEROLOGY | Facility: MEDICAL CENTER | Age: 48
Setting detail: OUTPATIENT SURGERY
End: 2023-05-19

## 2023-05-19 ENCOUNTER — ANESTHESIA EVENT (OUTPATIENT)
Dept: GASTROENTEROLOGY | Facility: MEDICAL CENTER | Age: 48
End: 2023-05-19

## 2023-05-19 ENCOUNTER — ANESTHESIA (OUTPATIENT)
Dept: GASTROENTEROLOGY | Facility: MEDICAL CENTER | Age: 48
End: 2023-05-19

## 2023-05-19 VITALS
HEART RATE: 84 BPM | TEMPERATURE: 97.2 F | RESPIRATION RATE: 16 BRPM | OXYGEN SATURATION: 93 % | DIASTOLIC BLOOD PRESSURE: 61 MMHG | SYSTOLIC BLOOD PRESSURE: 119 MMHG | HEIGHT: 69 IN | WEIGHT: 258 LBS | BODY MASS INDEX: 38.21 KG/M2

## 2023-05-19 DIAGNOSIS — Z12.11 SCREENING FOR COLON CANCER: ICD-10-CM

## 2023-05-19 DIAGNOSIS — K20.90 ESOPHAGITIS DETERMINED BY ENDOSCOPY: Primary | ICD-10-CM

## 2023-05-19 DIAGNOSIS — E66.9 OBESITY (BMI 35.0-39.9 WITHOUT COMORBIDITY): ICD-10-CM

## 2023-05-19 DIAGNOSIS — K21.9 GASTROESOPHAGEAL REFLUX DISEASE, UNSPECIFIED WHETHER ESOPHAGITIS PRESENT: ICD-10-CM

## 2023-05-19 RX ORDER — LIDOCAINE HYDROCHLORIDE 20 MG/ML
INJECTION, SOLUTION EPIDURAL; INFILTRATION; INTRACAUDAL; PERINEURAL AS NEEDED
Status: DISCONTINUED | OUTPATIENT
Start: 2023-05-19 | End: 2023-05-19

## 2023-05-19 RX ORDER — SODIUM CHLORIDE 9 MG/ML
125 INJECTION, SOLUTION INTRAVENOUS CONTINUOUS
Status: DISCONTINUED | OUTPATIENT
Start: 2023-05-19 | End: 2023-05-23 | Stop reason: HOSPADM

## 2023-05-19 RX ORDER — OMEPRAZOLE 40 MG/1
40 CAPSULE, DELAYED RELEASE ORAL
Qty: 90 CAPSULE | Refills: 3 | Status: SHIPPED | OUTPATIENT
Start: 2023-05-19

## 2023-05-19 RX ORDER — PROPOFOL 10 MG/ML
INJECTION, EMULSION INTRAVENOUS AS NEEDED
Status: DISCONTINUED | OUTPATIENT
Start: 2023-05-19 | End: 2023-05-19

## 2023-05-19 RX ADMIN — PROPOFOL 100 MG: 10 INJECTION, EMULSION INTRAVENOUS at 11:18

## 2023-05-19 RX ADMIN — SODIUM CHLORIDE 125 ML/HR: 0.9 INJECTION, SOLUTION INTRAVENOUS at 11:04

## 2023-05-19 RX ADMIN — PROPOFOL 100 MG: 10 INJECTION, EMULSION INTRAVENOUS at 11:14

## 2023-05-19 RX ADMIN — PROPOFOL 100 MG: 10 INJECTION, EMULSION INTRAVENOUS at 11:38

## 2023-05-19 RX ADMIN — PROPOFOL 100 MG: 10 INJECTION, EMULSION INTRAVENOUS at 11:29

## 2023-05-19 RX ADMIN — Medication 40 MG: at 11:35

## 2023-05-19 RX ADMIN — LIDOCAINE HYDROCHLORIDE 5 ML: 20 INJECTION, SOLUTION EPIDURAL; INFILTRATION; INTRACAUDAL at 11:11

## 2023-05-19 RX ADMIN — PROPOFOL 150 MG: 10 INJECTION, EMULSION INTRAVENOUS at 11:12

## 2023-05-19 NOTE — ANESTHESIA POSTPROCEDURE EVALUATION
"Post-Op Assessment Note    CV Status:  Stable    Pain management: adequate     Mental Status:  Alert and awake   Hydration Status:  Euvolemic   PONV Controlled:  Controlled   Airway Patency:  Patent      Post Op Vitals Reviewed: Yes            No notable events documented      BP      Temp     Pulse     Resp      SpO2      /61   Pulse 84   Temp (!) 97 2 °F (36 2 °C) (Temporal)   Resp 16   Ht 5' 9\" (1 753 m)   Wt 117 kg (258 lb)   SpO2 93%   BMI 38 10 kg/m²     "

## 2023-05-19 NOTE — ANESTHESIA PREPROCEDURE EVALUATION
Procedure:  COLONOSCOPY  EGD    Relevant Problems   CARDIO   (+) Hyperlipidemia      ENDO   (+) Type 2 diabetes mellitus without complication, without long-term current use of insulin (HCC)      GI/HEPATIC   (+) SBO (small bowel obstruction) (HCC)      MUSCULOSKELETAL   (+) Cervical spondylosis without myelopathy      NEURO/PSYCH   (+) Anxiety   (+) Mild episode of recurrent major depressive disorder (HCC)      PULMONARY   (+) Obstructive sleep apnea        Physical Exam    Airway    Mallampati score: IV         Dental   No notable dental hx     Cardiovascular  Cardiovascular exam normal    Pulmonary  Pulmonary exam normal     Other Findings        Anesthesia Plan  ASA Score- 3     Anesthesia Type- IV sedation with anesthesia with ASA Monitors  Additional Monitors:   Airway Plan:           Plan Factors-Exercise tolerance (METS): >4 METS  Chart reviewed  Patient is not a current smoker  Patient instructed to abstain from smoking on day of procedure  Patient did not smoke on day of surgery  Obstructive sleep apnea risk education given perioperatively  Induction- intravenous  Postoperative Plan-     Informed Consent- Anesthetic plan and risks discussed with patient

## 2023-05-19 NOTE — H&P
"H&P EXAM - Outpatient Endoscopy   Annette Paige 57 y o  male MRN: [id number]    [facility] [facility] PRE/PST   Encounter: [id number]        History and Physical -  [facility]  Annette Paige 57 y o  male MRN: [id number]                  HPI: Annette Paige is a 57y o  year old male who presents for colon cancer screening, obesity, preoperative EGD for possible bariatric surgery      REVIEW OF SYSTEMS: Per the HPI, and otherwise unremarkable  Historical Information   Past Medical History:   Diagnosis Date   • Diabetes ([facility] [address] )    • Hyperlipidemia    • YURIY on CPAP      Past Surgical History:   Procedure Laterality Date   • IA LAPS ABD PRTM&OMENTUM DX W/WO SPEC BR/WA SPX N/A 8/16/2022    Procedure: LAPAROSCOPY DIAGNOSTIC, LYSIS OF ADHESIONS, REDUCTION OF INTERNAL HERNIA;  Surgeon: Ibis Frazier MD;  Location: [facility];  Service: General   • VASECTOMY       Social History   Social History     Substance and Sexual Activity   Alcohol Use Never     Social History     Substance and Sexual Activity   Drug Use Never     Social History     Tobacco Use   Smoking Status Former   • Packs/day: 3 00   • Types: Cigarettes   Smokeless Tobacco Never   Tobacco Comments    8 YRS AGO     Family History   Family history unknown: Yes       Meds/Allergies     (Not in a hospital admission)      No Known Allergies    Objective     Ht 5' 9\" (1 753 m)   Wt 117 kg (258 lb)   BMI 38 10 kg/m²       PHYSICAL EXAM    Gen: NAD  CV: RRR  CHEST: Clear  ABD: soft, NT/ND  EXT: no edema      ASSESSMENT/PLAN:  This is a 57y o  year old male here for egd/colonoscopy, and he is stable and optimized for his procedure            "

## 2023-05-23 ENCOUNTER — TELEPHONE (OUTPATIENT)
Dept: PSYCHIATRY | Facility: CLINIC | Age: 48
End: 2023-05-23

## 2023-05-23 NOTE — TELEPHONE ENCOUNTER
Patient returned call  Added to Epic wait list for Med mgmt  Declined extra resource packet  Sunshine/Chew, sanjana genmeg pref, pref virtual, ROF

## 2023-05-25 NOTE — RESULT ENCOUNTER NOTE
Results relayed via Mychart  Unremarkable pathology      Repeat EGD and colonoscopy in 3 months at a hospital setting for history of esophagitis, inadequate bowel prep

## 2023-05-26 ENCOUNTER — TELEPHONE (OUTPATIENT)
Dept: GASTROENTEROLOGY | Facility: MEDICAL CENTER | Age: 48
End: 2023-05-26

## 2023-05-26 NOTE — TELEPHONE ENCOUNTER
----- Message from Hernán Dinh MD sent at 5/25/2023  2:14 PM EDT -----  Results relayed via [facility]  Unremarkable pathology      Repeat EGD and colonoscopy in 3 months at a hospital setting for history of esophagitis, inadequate bowel prep

## 2023-06-01 ENCOUNTER — OFFICE VISIT (OUTPATIENT)
Dept: SLEEP CENTER | Facility: CLINIC | Age: 48
End: 2023-06-01

## 2023-06-01 VITALS
SYSTOLIC BLOOD PRESSURE: 126 MMHG | HEIGHT: 69 IN | BODY MASS INDEX: 38.51 KG/M2 | DIASTOLIC BLOOD PRESSURE: 82 MMHG | HEART RATE: 90 BPM | WEIGHT: 260 LBS

## 2023-06-01 DIAGNOSIS — E66.01 CLASS 2 SEVERE OBESITY DUE TO EXCESS CALORIES WITH SERIOUS COMORBIDITY AND BODY MASS INDEX (BMI) OF 37.0 TO 37.9 IN ADULT (HCC): ICD-10-CM

## 2023-06-01 DIAGNOSIS — G47.33 OBSTRUCTIVE SLEEP APNEA: Primary | ICD-10-CM

## 2023-06-01 NOTE — ASSESSMENT & PLAN NOTE
Patient is currently successfully using CPAP for treatment of his sleep apnea  He is using it 100% of the time with an AHI of 3 6  He is frequently reaching his maximum pressure of 15  I increased his pressure to 9-20 today  He will replace all of his supplies  Continue to use nightly and follow-up in 6 months  I advised him to call sooner if he has any issues with the increased pressure

## 2023-06-01 NOTE — ASSESSMENT & PLAN NOTE
The patient is planning for gastric sleeve surgery in October or November of this year  His ultimate goal is to lose weight so that he may qualify for inspire or not need to use any treatment for sleep apnea at all

## 2023-06-01 NOTE — PROGRESS NOTES
"        Progress Note - [facility] 57 y o  male   :1975, MRN: [id number]  2023          Follow Up Evaluation / Problem:     Obstructive Sleep Apnea      Thank you for the opportunity of participating in the evaluation and care of this patient [facility]  HPI: Kevin Bravo is a 57y o  year old male  The patient presents for follow up of obstructive sleep apnea  He previously saw sleep medicine in [place], they describe a test in  that showed mild obstructive sleep apnea, AHI 5  At the time of the study, he believes he weighed approximately 180 pounds  He has since had a 80 pound weight gain  He was initially titrated to CPAP of 10 cm H2O which was effective  It was since increased to a pressure of 9 to 15 cm of H2O  He presents to review compliance and effectiveness of treatment  Current Outpatient Medications:   •  atorvastatin (LIPITOR) 20 mg tablet, Take 1 tablet (20 mg total) by mouth daily, Disp: 90 tablet, Rfl: 1  •  metFORMIN (GLUCOPHAGE) 1000 MG tablet, Take 1,000 mg by mouth 2 (two) times a day, Disp: , Rfl:   •  omeprazole (PriLOSEC) 40 MG capsule, Take 1 capsule (40 mg total) by mouth daily before breakfast, Disp: 90 capsule, Rfl: 3  •  venlafaxine (EFFEXOR-XR) 75 mg 24 hr capsule, Take 1 capsule (75 mg total) by mouth daily with breakfast, Disp: 30 capsule, Rfl: 5  •  albuterol (PROVENTIL HFA,VENTOLIN HFA) 90 mcg/act inhaler, INHALE 2 PUFFS EVERY 6 HOURS AS NEEDED FOR WHEEZING (Patient not taking: Reported on 2023), Disp: 18 g, Rfl: 1                   Vitals:    23 1535   BP: 126/82   BP Location: Left arm   Patient Position: Sitting   Cuff Size: Large   Pulse: 90   Weight: 118 kg (260 lb)   Height: 5' 9\" (1 753 m)       Body mass index is 38 4 kg/m²              EPWORTH SLEEPINESS SCORE         Past History Since Last Sleep Center Visit: " He did not begin using his new CPAP he received from [employer] as he was unable to set the pressure correctly  He brought the machine with him today so that we may adjust the pressure  He feels he may need an increase in pressure as his wife is noting him to be snoring slightly with his CPAP  He states he uses his CPAP nightly without significant difficulty but his ultimate goal is to have inspire implanted  He is planning on having gastric sleeve surgery in October or November of this year  After he reaches a BMI of less than 35 hopefully 32, he would then like to be retested for sleep apnea and rediscuss possible inspire implant  The patient reports that he does not clean the equipment appropriately and changes supplies on a regular basis  The review of systems and following portions of the patient's history were reviewed and updated as appropriate: allergies, current medications, past family history, past medical history, past social history, past surgical history, and problem list         OBJECTIVE  Equipment set up date:  2011 [employer] station, got new CPAP from [facility] couple months ago    PAP Pressure: Nasal AutoPAP using a lower limit of 9 cm and an upper limit of 15 cm of water pressure increase to 9 to 20 cm of H2O today  Type of mask used: full face  DME Provider: Clover Oliveira  Denies aerophagia or AM headaches    Physical Exam:     General Appearance:   Alert, cooperative, no distress, appears stated age, obese        ASSESSMENT / PLAN    1  Obstructive sleep apnea  Assessment & Plan:  Patient is currently successfully using CPAP for treatment of his sleep apnea  He is using it 100% of the time with an AHI of 3 6  He is frequently reaching his maximum pressure of 15  I increased his pressure to 9-20 today  He will replace all of his supplies  Continue to use nightly and follow-up in 6 months    I advised him to call sooner if he has any issues with the increased pressure  Orders:  -     PAP DME Pressure Change  -     PAP DME Resupply/Reorder    2  Class 2 severe obesity due to excess calories with serious comorbidity and body mass index (BMI) of 37 0 to 37 9 in adult [facility])  Assessment & Plan:  The patient is planning for gastric sleeve surgery in October or November of this year  His ultimate goal is to lose weight so that he may qualify for inspire or not need to use any treatment for sleep apnea at all  Counseling / Coordination of Care  I have spent a total time of 35 minutes on 06/01/23 in caring for this patient including Diagnostic results, Prognosis, Risks and benefits of tx options, Instructions for management, Patient and family education, Importance of tx compliance, Risk factor reductions, Impressions, Counseling / Coordination of care, Documenting in the medical record, Reviewing / ordering tests, medicine, procedures   and Obtaining or reviewing history    Today I reviewed the patient's compliance data  he has been able to use the equipment 100% of all days recorded  Average usage was 4 or more hours 100% of all days recorded  The patient uses the equipment for an average of 8 hours and 59 minutes per night  The estimated AHI is 3 6 abnormal breathing events per hour  The patient feels they benefit from the use of PAP equipment and would like to continue PAP therapy  Response to treatment has been good  A prescription for supplies has been provided to last for the next year  He will continue using this equipment at the settings noted above for the next 6 months  At that timehe will then return for a routine follow-up evaluation  I have asked the patient to contact the Sleep [address]  if he encounters any difficulties prior to that time  The following instructions have been given to the patient today:    Patient Instructions   Your new CPAP from [place] was programmed today and is already for use    I increased your upper pressure to 20 as you were reaching 15 regularly on your previous compliance report  I also placed an order for supplies  Please replace all of your supplies and start with new supplies  Please replace them regularly and clean weekly  You are doing a great job using it nightly  Continue to use nightly and we will follow-up with you in 6 months  You may return sooner if you have any concerns  Nursing Support:  When: Monday through Friday 7A-5PM except holidays  Where: Our direct line is [phone]  If you are having a true emergency please call 911  In the event that the line is busy or it is after hours please leave a voice message and we will return your call  Please speak clearly, leaving your full name, birth date, best number to reach you and the reason for your call  Medication refills: We will need the name of the medication, the dosage, the ordering provider, whether you get a 30 or 90 day refill, and the pharmacy name and address  Medications will be ordered by the provider only  Nurses cannot call in prescriptions  Please allow 7 days for medication refills  Physician requested updates: If your provider requested that you call with an update after starting medication, please be ready to provide us the medication and dosage, what time you take your medication, the time you attempt to fall asleep, time you fall asleep, when you wake up, and what time you get out of bed  Sleep Study Results: We will contact you with sleep study results and/or next steps after the physician has reviewed your testing             DONNIE Sanders 15

## 2023-06-01 NOTE — PATIENT INSTRUCTIONS
Your new CPAP from [facility] was programmed today and is already for use  I increased your upper pressure to 20 as you were reaching 15 regularly on your previous compliance report  I also placed an order for supplies  Please replace all of your supplies and start with new supplies  Please replace them regularly and clean weekly  You are doing a great job using it nightly  Continue to use nightly and we will follow-up with you in 6 months  You may return sooner if you have any concerns  Nursing Support:  When: Monday through Friday 7A-5PM except holidays  Where: Our direct line is [phone]  If you are having a true emergency please call 911  In the event that the line is busy or it is after hours please leave a voice message and we will return your call  Please speak clearly, leaving your full name, birth date, best number to reach you and the reason for your call  Medication refills: We will need the name of the medication, the dosage, the ordering provider, whether you get a 30 or 90 day refill, and the pharmacy name and address  Medications will be ordered by the provider only  Nurses cannot call in prescriptions  Please allow 7 days for medication refills  Physician requested updates: If your provider requested that you call with an update after starting medication, please be ready to provide us the medication and dosage, what time you take your medication, the time you attempt to fall asleep, time you fall asleep, when you wake up, and what time you get out of bed  Sleep Study Results: We will contact you with sleep study results and/or next steps after the physician has reviewed your testing

## 2023-06-02 ENCOUNTER — TELEPHONE (OUTPATIENT)
Dept: SLEEP CENTER | Facility: CLINIC | Age: 48
End: 2023-06-02

## 2023-06-02 LAB

## 2023-06-05 LAB
DME PARACHUTE DELIVERY DATE ACTUAL: NORMAL
DME PARACHUTE DELIVERY DATE REQUESTED: NORMAL
DME PARACHUTE ITEM DESCRIPTION: NORMAL
DME PARACHUTE ORDER STATUS: NORMAL
DME PARACHUTE SUPPLIER NAME: NORMAL
DME PARACHUTE SUPPLIER PHONE: NORMAL

## 2023-06-13 NOTE — PROGRESS NOTES
"  Name           Ariadna Ware      2/6 weight check  Starting weight 259 5  Last time weight 257 5  Today's weight 260 9  BMI:38 53    Surgery month: Nov/Dec   Surgery deadline: March  Surgeon:Dr Karla Ojeda  Type of Surgery: Sleeve  Behavioral Health Follow Up Note:        Mental health and wellness -   Patient presents to the office today for a weight check and support visit  The pt was present with spouse  He explained that he has not done any changes and was not certain why  Discussed reasons why he was perusing bariatric surgery  Reviewed workflow and making small steps towards healthy changes  Eating behaviors - three meals a day  No portion control not tracking meals  Not eating slow or practicing 30/60 rule  Hydration- water- a gallon a day  Drinks \"a lot\" of sweet  lemonade  Discussed tracking food to help if making poor eating choices and to bring to his next jose e with RD  Reports he has not read bariatric book  Stopped coffee intake     Activity - physical at work       Progress toward program requirements    Workflow:  · Psych and/or D+A Clearance: n/a  · PCP Letter: pending  · Support Group: pending   · Surgeon Appt :5/12/23   · EGD : pending  · Cardiac Risk Assessment: pending   · Sleep Studies: uses a CPAP  · Bloodwork: pending  · Nicotine test:non-smoker      Reviewed and discussed  Adequate hydration  Exercise  Meal planning and preparation  Lifestyle changes  Possible problems with poor eating habits  Techniques for self monitoring and keeping daily food journal    Goal: 1- read bariatric book 2- start to make healthy eating choices  Next appointment:7/14/23 HUONG  "

## 2023-06-15 ENCOUNTER — OFFICE VISIT (OUTPATIENT)
Dept: BARIATRICS | Facility: CLINIC | Age: 48
End: 2023-06-15

## 2023-06-15 ENCOUNTER — PATIENT MESSAGE (OUTPATIENT)
Dept: FAMILY MEDICINE CLINIC | Facility: CLINIC | Age: 48
End: 2023-06-15

## 2023-06-15 VITALS — BODY MASS INDEX: 38.53 KG/M2 | WEIGHT: 260.9 LBS

## 2023-06-15 DIAGNOSIS — E66.9 OBESITY, CLASS II, BMI 35-39.9: Primary | ICD-10-CM

## 2023-06-15 PROCEDURE — RECHECK

## 2023-06-20 ENCOUNTER — PATIENT MESSAGE (OUTPATIENT)
Dept: FAMILY MEDICINE CLINIC | Facility: CLINIC | Age: 48
End: 2023-06-20

## 2023-07-11 NOTE — PROGRESS NOTES
Name           Thelma Chamorro      3/6 weight check  Starting weight 259.5  Last time weight 260.9  Today's weight 264.8  BMI:39.10    Surgery month:  Nov/Dec  Surgery deadline: March  Surgeon:Dr. Chelita Rodriguez  Type of Surgery: Sleeve    Behavioral Health Follow Up Note:        Mental health and wellness -   Patient presents to the office today for weight check and support visit. The pt explained that his son is current working with him and with the longs days it is hard to implement healthy eating but the last week of the month his son will be working on his own and the pt shared he can then implement  more of a healthy schedule. The pt shared he continues to use his CPAP nightly and sleeps well. Eating behaviors - three meals a day and  a light snack. Reports he eats fast-Discussed chewing food. Practicing 30/60. Reading bariatric book. Hydration-a gallon  a day. Stopped soda but is having one sweet lemonade a day. Activity - gym-1/2-1 hour three or four days a week. sauna     Progress toward program requirements    Workflow:  ·   •  Psych and/or D+A Clearance: n/a  • PCP Letter: done  • Support Group: pending pt reports he will attend 7.17.23  • Surgeon Appt.:5/12/23   • EGD : pending pt reported he failed and would have have it redone in Aug   • Cardiac Risk Assessment: pending   • Sleep Studies: uses a CPAP  • Bloodwork: pending  Nicotine test:non-smoker     Reviewed and discussed  . Adequate hydration  Exercise  Meal planning and preparation  Lifestyle changes  Possible problems with poor eating habits  Techniques for self monitoring and keeping daily food journal    Goal: 1-Continue to maintain good water intake 2- practice eating slow and  chewing food.   Next appointment:8/25/23 HUONG

## 2023-07-14 ENCOUNTER — OFFICE VISIT (OUTPATIENT)
Dept: BARIATRICS | Facility: CLINIC | Age: 48
End: 2023-07-14

## 2023-07-14 VITALS — WEIGHT: 264.8 LBS | BODY MASS INDEX: 39.1 KG/M2

## 2023-07-14 DIAGNOSIS — E66.9 OBESITY, CLASS II, BMI 35-39.9: Primary | ICD-10-CM

## 2023-07-14 PROCEDURE — RECHECK

## 2023-07-28 ENCOUNTER — VBI (OUTPATIENT)
Dept: ADMINISTRATIVE | Facility: OTHER | Age: 48
End: 2023-07-28

## 2023-08-12 ENCOUNTER — PATIENT MESSAGE (OUTPATIENT)
Dept: FAMILY MEDICINE CLINIC | Facility: CLINIC | Age: 48
End: 2023-08-12

## 2023-08-28 ENCOUNTER — TELEPHONE (OUTPATIENT)
Dept: BARIATRICS | Facility: CLINIC | Age: 48
End: 2023-08-28

## 2023-09-05 ENCOUNTER — OFFICE VISIT (OUTPATIENT)
Dept: GASTROENTEROLOGY | Facility: MEDICAL CENTER | Age: 48
End: 2023-09-05
Payer: COMMERCIAL

## 2023-09-05 VITALS
TEMPERATURE: 96 F | DIASTOLIC BLOOD PRESSURE: 70 MMHG | BODY MASS INDEX: 39.1 KG/M2 | WEIGHT: 264.8 LBS | HEART RATE: 86 BPM | SYSTOLIC BLOOD PRESSURE: 132 MMHG

## 2023-09-05 DIAGNOSIS — K21.00 GASTROESOPHAGEAL REFLUX DISEASE WITH ESOPHAGITIS WITHOUT HEMORRHAGE: Primary | ICD-10-CM

## 2023-09-05 DIAGNOSIS — Z12.11 SCREENING FOR COLON CANCER: ICD-10-CM

## 2023-09-05 PROCEDURE — 99214 OFFICE O/P EST MOD 30 MIN: CPT | Performed by: PHYSICIAN ASSISTANT

## 2023-09-05 RX ORDER — POLYETHYLENE GLYCOL 3350 17 G/17G
POWDER, FOR SOLUTION ORAL
Qty: 238 G | Refills: 0 | Status: SHIPPED | OUTPATIENT
Start: 2023-09-05

## 2023-09-05 RX ORDER — BISACODYL 5 MG/1
TABLET, DELAYED RELEASE ORAL
Qty: 2 TABLET | Refills: 0 | Status: SHIPPED | OUTPATIENT
Start: 2023-09-05

## 2023-09-05 NOTE — PROGRESS NOTES
[facility] - Outpatient Follow-up Note  Papi Guevara 57 y.o. male MRN: [id number]  Encounter: [id number]      Assessment and Plan    1. GERD with esophagitis  EGD 5/19/23 revealed a moderate sized hiatal hernia and severe grade D esophagitis of the middle and lower third of the esophagus, recommended high dose PPI and EGD recall 12 weeks. The patient has been taking PPI 40mg once daily.  -Continue PPI 40mg once daily  -Repeat EGD to ensure esophagitis has healed and to rule out barretts    2. Colon cancer screening  Colonoscopy 5/19/23 with 1 subcentimeter colon polyp removed and small internal hemorrhoids however prep was inadequate and although the cecum was reached the procedure was quickly terminated thereafter given difficulty with patient's sedation and moving during the procedure.  -Repeat colonoscopy in the hospital setting with 2 day bowel prep     Follow up after EGD and colonoscopy     ______________________________________________________________________    History of Present Illness  Papi Guevara is a 57 y.o. male with DM2, HLD, YURIY, obesity here for follow up evaluation of EGD and colonoscopy. EGD was done for GERD and revealed a moderate size hiatal hernia and severe grade D esophagitis of the middle and lower third of the esophagus, recommended BID PPI and EGD recall 12 weeks. Colonoscopy 1 subcentimeter colon polyp removed and small internal hemorrhoids however prep was inadequate and although the cecum was reached the procedure was quickly terminated thereafter given difficulty with patient's sedation and moving during the procedure. Review of Systems   Constitutional: Negative for activity change, appetite change, chills, fatigue, fever and unexpected weight change. HENT: Negative for trouble swallowing.     Gastrointestinal: Negative for abdominal distention, abdominal pain, anal bleeding, blood in stool, constipation, diarrhea, nausea, rectal pain and vomiting.        Past Medical History  Past Medical History:   Diagnosis Date   • Diabetes ([address])    • Hyperlipidemia    • YURIY on CPAP        Past Social history  Past Surgical History:   Procedure Laterality Date   • [place] LAPS ABD PRTM&OMENTUM DX W/WO SPEC BR/WA SPX N/A 8/16/2022    Procedure: LAPAROSCOPY DIAGNOSTIC, LYSIS OF ADHESIONS, REDUCTION OF INTERNAL HERNIA;  Surgeon: Tavon Araujo MD;  Location: [facility];  Service: General   • VASECTOMY       Social History     Socioeconomic History   • Marital status: /Civil Union     Spouse name: Not on file   • Number of children: Not on file   • Years of education: Not on file   • Highest education level: Not on file   Occupational History   • Not on file   Tobacco Use   • Smoking status: Former     Packs/day: 3.00     Types: Cigarettes   • Smokeless tobacco: Never   • Tobacco comments:     8 YRS AGO   Substance and Sexual Activity   • Alcohol use: Never   • Drug use: Never   • Sexual activity: Yes   Other Topics Concern   • Not on file   Social History Narrative   • Not on file     Social Determinants of Health     Financial Resource Strain: Not on file   Food Insecurity: Not on file   Transportation Needs: Not on file   Physical Activity: Not on file   Stress: Not on file   Social Connections: Not on file   Intimate Partner Violence: Not on file   Housing Stability: Not on file     Social History     Substance and Sexual Activity   Alcohol Use Never     Social History     Substance and Sexual Activity   Drug Use Never     Social History     Tobacco Use   Smoking Status Former   • Packs/day: 3.00   • Types: Cigarettes   Smokeless Tobacco Never   Tobacco Comments    8 YRS AGO       Past Family History  Family History   Family history unknown: Yes       Current Medications  Current Outpatient Medications   Medication Sig Dispense Refill   • atorvastatin (LIPITOR) 20 mg tablet Take 1 tablet (20 mg total) by mouth daily 90 tablet 1   • metFORMIN (GLUCOPHAGE) 1000 MG tablet Take 1,000 mg by mouth 2 (two) times a day     • omeprazole (PriLOSEC) 40 MG capsule Take 1 capsule (40 mg total) by mouth daily before breakfast 90 capsule 3   • venlafaxine (EFFEXOR-XR) 75 mg 24 hr capsule Take 1 capsule (75 mg total) by mouth daily with breakfast 30 capsule 5   • albuterol (PROVENTIL HFA,VENTOLIN HFA) 90 mcg/act inhaler INHALE 2 PUFFS EVERY 6 HOURS AS NEEDED FOR WHEEZING (Patient not taking: Reported on 5/12/2023) 18 g 1   • bisacodyl (DULCOLAX) 5 mg EC tablet Take as directed as per written office instructions (Patient not taking: Reported on 9/5/2023) 2 tablet 0   • polyethylene glycol (GLYCOLAX) 17 GM/SCOOP powder Take as directed as per written office instructions (Patient not taking: Reported on 9/5/2023) 238 g 0   • polyethylene glycol (GOLYTELY) 4000 mL solution Take 4,000 mL by mouth once for 1 dose (Patient not taking: Reported on 9/5/2023) 4000 mL 0     No current facility-administered medications for this visit. Allergies  No Known Allergies      The following portions of the patient's history were reviewed and updated as appropriate: allergies, current medications, past medical history, past social history, past surgical history and problem list.      Vitals  There were no vitals filed for this visit. Physical Exam  Constitutional   General appearance: Patient is seated and in no acute distress, well appearing and well nourished. Head and Face   Head and face: Normal.    Eyes   Conjunctiva and lids: No erythema, swelling or discharge. Anicteric. Ears, Nose, Mouth, and Throat   Hearing: Normal.    Neck: Supple, trachea midline. Pulmonary   Respiratory effort: No increased work of breathing or signs of respiratory distress.     Lungs: Clear to ascultation, no wheezes, rhonchi, or rales  Cardiovascular   Heart: Regular rate and rhythm, no murmurs gallops or rubs   Examination of extremities for edema and/or varicosities: Normal.    Musculoskeletal   Gait and station: Normal   Skin   Skin and subcutaneous tissue: Warm, dry, and intact. No visible jaundice, lesions or rashes. Psychiatric   Judgment and insight: Normal  Recent and remote memory:  Normal  Mood and affect: Normal       Results  No visits with results within 1 Day(s) from this visit. Latest known visit with results is:   Telephone on 06/02/2023   Component Date Value   • Supplier Name 06/02/2023 [employer]    • Supplier Phone Number 06/02/2023 [phone]    • Order Status 06/02/2023 Delivery Successful    • Delivery Request Date 06/02/2023 06/02/2023    • Date Delivered  06/02/2023 06/02/2023    • Item Description 06/02/2023 Pressure Change    • Item Description 06/02/2023 PAP Accessory    • Item Description 06/02/2023 PAP Mask, Full Face, Fit Upon Setup, N/A, 1 per 3 months    • Item Description 06/02/2023 Humidifier Water Chamber, 1 per 6 months    • Item Description 06/02/2023 PAP Headgear, 1 per 6 months    • Item Description 06/02/2023 PAP Humidifier, Heated    • Item Description 06/02/2023 Heated PAP Tubing, 1 per 3 months    • Item Description 06/02/2023 Disposable PAP Filter, 2 per 1 month    • Item Description 06/02/2023 Non-Disposable PAP Filter, 1 per 6 months        Radiology Results  No results found. Orders  Orders Placed This Encounter   Procedures   • EGD     Standing Status:   Future     Standing Expiration Date:   9/5/2024     Order Specific Question:   Requested Site     Answer:   [facility]     Order Specific Question:   Performing Location     Answer:   Endoscopy Dept     Order Specific Question:   Anesthesia required? Answer: Yes   • Colonoscopy     Standing Status:   Future     Standing Expiration Date:   9/5/2024     Order Specific Question:   Requested Site     Answer:   [facility]     Order Specific Question:   Performing Location     Answer:   Endoscopy Dept     Order Specific Question:   Anesthesia required? Answer:    Yes

## 2023-09-29 ENCOUNTER — TELEPHONE (OUTPATIENT)
Dept: BARIATRICS | Facility: CLINIC | Age: 48
End: 2023-09-29

## 2023-10-05 ENCOUNTER — ANESTHESIA (OUTPATIENT)
Dept: GASTROENTEROLOGY | Facility: HOSPITAL | Age: 48
End: 2023-10-05

## 2023-10-05 ENCOUNTER — ANESTHESIA EVENT (OUTPATIENT)
Dept: GASTROENTEROLOGY | Facility: HOSPITAL | Age: 48
End: 2023-10-05

## 2023-10-05 ENCOUNTER — HOSPITAL ENCOUNTER (OUTPATIENT)
Dept: GASTROENTEROLOGY | Facility: HOSPITAL | Age: 48
Setting detail: OUTPATIENT SURGERY
End: 2023-10-05
Attending: INTERNAL MEDICINE | Admitting: INTERNAL MEDICINE
Payer: COMMERCIAL

## 2023-10-05 VITALS
HEIGHT: 69 IN | DIASTOLIC BLOOD PRESSURE: 70 MMHG | RESPIRATION RATE: 14 BRPM | BODY MASS INDEX: 38.51 KG/M2 | WEIGHT: 260 LBS | SYSTOLIC BLOOD PRESSURE: 107 MMHG | TEMPERATURE: 97.2 F | HEART RATE: 72 BPM | OXYGEN SATURATION: 94 %

## 2023-10-05 DIAGNOSIS — Z12.11 SCREENING FOR COLON CANCER: ICD-10-CM

## 2023-10-05 DIAGNOSIS — K21.00 GASTROESOPHAGEAL REFLUX DISEASE WITH ESOPHAGITIS WITHOUT HEMORRHAGE: ICD-10-CM

## 2023-10-05 PROBLEM — J22 LOWER RESPIRATORY INFECTION: Status: RESOLVED | Noted: 2023-03-10 | Resolved: 2023-10-05

## 2023-10-05 PROBLEM — Z87.898 HISTORY OF ANESTHESIA COMPLICATIONS: Status: ACTIVE | Noted: 2023-10-05

## 2023-10-05 PROBLEM — I95.9 HYPOTENSION: Status: RESOLVED | Noted: 2022-11-21 | Resolved: 2023-10-05

## 2023-10-05 LAB — GLUCOSE SERPL-MCNC: 121 MG/DL (ref 65–140)

## 2023-10-05 PROCEDURE — 82948 REAGENT STRIP/BLOOD GLUCOSE: CPT

## 2023-10-05 PROCEDURE — 88305 TISSUE EXAM BY PATHOLOGIST: CPT | Performed by: PATHOLOGY

## 2023-10-05 PROCEDURE — 88313 SPECIAL STAINS GROUP 2: CPT | Performed by: PATHOLOGY

## 2023-10-05 RX ORDER — LIDOCAINE HYDROCHLORIDE 10 MG/ML
0.5 INJECTION, SOLUTION EPIDURAL; INFILTRATION; INTRACAUDAL; PERINEURAL ONCE AS NEEDED
Status: ACTIVE | OUTPATIENT
Start: 2023-10-05

## 2023-10-05 RX ORDER — PROPOFOL 10 MG/ML
INJECTION, EMULSION INTRAVENOUS AS NEEDED
Status: DISCONTINUED | OUTPATIENT
Start: 2023-10-05 | End: 2023-10-05

## 2023-10-05 RX ORDER — FENTANYL CITRATE 50 UG/ML
INJECTION, SOLUTION INTRAMUSCULAR; INTRAVENOUS AS NEEDED
Status: DISCONTINUED | OUTPATIENT
Start: 2023-10-05 | End: 2023-10-05

## 2023-10-05 RX ORDER — PROPOFOL 10 MG/ML
INJECTION, EMULSION INTRAVENOUS CONTINUOUS PRN
Status: DISCONTINUED | OUTPATIENT
Start: 2023-10-05 | End: 2023-10-05

## 2023-10-05 RX ORDER — LIDOCAINE HYDROCHLORIDE 20 MG/ML
INJECTION, SOLUTION EPIDURAL; INFILTRATION; INTRACAUDAL; PERINEURAL AS NEEDED
Status: DISCONTINUED | OUTPATIENT
Start: 2023-10-05 | End: 2023-10-05

## 2023-10-05 RX ORDER — MIDAZOLAM HYDROCHLORIDE 2 MG/2ML
INJECTION, SOLUTION INTRAMUSCULAR; INTRAVENOUS AS NEEDED
Status: DISCONTINUED | OUTPATIENT
Start: 2023-10-05 | End: 2023-10-05

## 2023-10-05 RX ORDER — SODIUM CHLORIDE, SODIUM LACTATE, POTASSIUM CHLORIDE, CALCIUM CHLORIDE 600; 310; 30; 20 MG/100ML; MG/100ML; MG/100ML; MG/100ML
125 INJECTION, SOLUTION INTRAVENOUS CONTINUOUS
Status: DISCONTINUED | OUTPATIENT
Start: 2023-10-05 | End: 2023-10-05

## 2023-10-05 RX ADMIN — DEXMEDETOMIDINE HYDROCHLORIDE 8 MCG: 100 INJECTION, SOLUTION INTRAVENOUS at 07:36

## 2023-10-05 RX ADMIN — PROPOFOL 200 MG: 10 INJECTION, EMULSION INTRAVENOUS at 07:36

## 2023-10-05 RX ADMIN — PROPOFOL 200 MCG/KG/MIN: 10 INJECTION, EMULSION INTRAVENOUS at 07:37

## 2023-10-05 RX ADMIN — FENTANYL CITRATE 50 MCG: 50 INJECTION, SOLUTION INTRAMUSCULAR; INTRAVENOUS at 07:28

## 2023-10-05 RX ADMIN — MIDAZOLAM 2 MG: 1 INJECTION INTRAMUSCULAR; INTRAVENOUS at 07:28

## 2023-10-05 RX ADMIN — SODIUM CHLORIDE, SODIUM LACTATE, POTASSIUM CHLORIDE, AND CALCIUM CHLORIDE: .6; .31; .03; .02 INJECTION, SOLUTION INTRAVENOUS at 07:27

## 2023-10-05 RX ADMIN — LIDOCAINE HYDROCHLORIDE 100 MG: 20 INJECTION, SOLUTION EPIDURAL; INFILTRATION; INTRACAUDAL; PERINEURAL at 07:36

## 2023-10-05 RX ADMIN — DEXMEDETOMIDINE HYDROCHLORIDE 12 MCG: 100 INJECTION, SOLUTION INTRAVENOUS at 07:38

## 2023-10-05 NOTE — ANESTHESIA POSTPROCEDURE EVALUATION
Post-Op Assessment Note    CV Status:  Stable  Pain Score: 0    Pain management: adequate     Mental Status:  Alert and awake   Hydration Status:  Euvolemic   PONV Controlled:  Controlled   Airway Patency:  Patent      Post Op Vitals Reviewed: Yes      Staff: CRNA         No notable events documented.     BP      Temp     Pulse     Resp      SpO2

## 2023-10-05 NOTE — ANESTHESIA PREPROCEDURE EVALUATION
Procedure:  EGD  COLONOSCOPY        Relevant Problems   ANESTHESIA  Pt difficult to sedate x2 for colonoscopy and vasectomy. Per Dr. Nicky Campos, pt was violent/punching and his colon had to be aborted last time.    (+) History of anesthesia complications      CARDIO   (+) Hyperlipidemia      ENDO   (+) Type 2 diabetes mellitus without complication, without long-term current use of insulin (HCC)      NEURO/PSYCH   (+) Anxiety   (+) Mild episode of recurrent major depressive disorder (HCC)      PULMONARY   (+) Obstructive sleep apnea      Musculoskeletal and Integument   (+) Discoid lupus      Other   (+) Class 2 severe obesity due to excess calories with serious comorbidity and body mass index (BMI) of 37.0 to 37.9 in adult         Physical Exam    Airway    Mallampati score: III  TM Distance: >3 FB  Neck ROM: full     Dental       Cardiovascular      Pulmonary      Other Findings        Anesthesia Plan  ASA Score- 3     Anesthesia Type- IV sedation with anesthesia with ASA Monitors. Additional Monitors:   Airway Plan:           Plan Factors-Exercise tolerance (METS): >4 METS. Chart reviewed. EKG reviewed. Existing labs reviewed. Patient summary reviewed. Induction- intravenous. Postoperative Plan-     Informed Consent- Anesthetic plan and risks discussed with patient. I personally reviewed this patient with the CRNA. Discussed and agreed on the Anesthesia Plan with the CRNA. Opal Contreras English

## 2023-10-05 NOTE — H&P
H&P EXAM - Outpatient Endoscopy   Shaunna Elder 57 y.o. male MRN: [id number]    [address]   Encounter: [id number]        History and Physical -  [facility]  Shaunna Elder 57 y.o. male MRN: [id number]                  HPI: Shaunna Elder is a 57y.o. year old male who presents for esophagitis, colon cancer screening      REVIEW OF SYSTEMS: Per the HPI, and otherwise unremarkable. Historical Information   Past Medical History:   Diagnosis Date   • Colon polyp    • Diabetes ([address])    • Hyperlipidemia    • YURIY on CPAP      Past Surgical History:   Procedure Laterality Date   • COLONOSCOPY     • [place] LAPS ABD PRTM&OMENTUM DX W/WO SPEC BR/WA SPX N/A 08/16/2022    Procedure: LAPAROSCOPY DIAGNOSTIC, LYSIS OF ADHESIONS, REDUCTION OF INTERNAL HERNIA;  Surgeon: Lacie Galindo MD;  Location: [facility];  Service: General   • VASECTOMY       Social History   Social History     Substance and Sexual Activity   Alcohol Use Never     Social History     Substance and Sexual Activity   Drug Use Never     Social History     Tobacco Use   Smoking Status Former   • Packs/day: 3.00   • Types: Cigarettes   Smokeless Tobacco Never   Tobacco Comments    8 YRS AGO     Family History   Family history unknown: Yes       Meds/Allergies     (Not in a hospital admission)      No Known Allergies    Objective     /64   Pulse 67   Temp (!) 97.1 °F (36.2 °C) (Temporal)   Resp 16   Ht 5' 9" (1.753 m)   Wt 118 kg (260 lb)   SpO2 96%   BMI 38.40 kg/m²       PHYSICAL EXAM    Gen: NAD  CV: RRR  CHEST: Clear  ABD: soft, NT/ND  EXT: no edema      ASSESSMENT/PLAN:  This is a 57y.o. year old male here for EGD, colonoscopy, and he is stable and optimized for his procedure.

## 2023-10-09 PROCEDURE — 88313 SPECIAL STAINS GROUP 2: CPT | Performed by: PATHOLOGY

## 2023-10-09 PROCEDURE — 88305 TISSUE EXAM BY PATHOLOGIST: CPT | Performed by: PATHOLOGY

## 2023-10-09 NOTE — RESULT ENCOUNTER NOTE
Please call the patient with the results    Biopsies of the esophagus show Valdez's esophagus. This is a precancerous change to the lining of the esophagus due to acid reflux. He should continue to take his acid lowering medicine as prescribed. We will repeat his EGD in 1 year    The  colon polyp removed was called an adenoma. This is a pre-cancerous lesion and was completely removed. There was no evidence of cancer in the polyp.      He should have the colonoscopy repeated in 3 years due to a history of colon polyps

## 2023-10-17 ENCOUNTER — OFFICE VISIT (OUTPATIENT)
Dept: GASTROENTEROLOGY | Facility: MEDICAL CENTER | Age: 48
End: 2023-10-17

## 2023-10-17 VITALS
BODY MASS INDEX: 39.93 KG/M2 | HEART RATE: 102 BPM | TEMPERATURE: 98 F | WEIGHT: 270.4 LBS | SYSTOLIC BLOOD PRESSURE: 116 MMHG | DIASTOLIC BLOOD PRESSURE: 72 MMHG

## 2023-10-17 DIAGNOSIS — Z86.010 PERSONAL HISTORY OF COLONIC POLYPS: ICD-10-CM

## 2023-10-17 DIAGNOSIS — K21.9 GASTROESOPHAGEAL REFLUX DISEASE, UNSPECIFIED WHETHER ESOPHAGITIS PRESENT: Primary | ICD-10-CM

## 2023-10-17 NOTE — PROGRESS NOTES
[facility] - Outpatient Follow-up Note  Shaunna Elder 57 y.o. male MRN: [id number]  Encounter: [id number]    Assessment and Plan    1. GERD  2. Valdez's esophagus  EGD 5/19/23 revealed a moderate sized hiatal hernia and severe grade D esophagitis of the middle and lower third of the esophagus. He was started on high-dose PPI and repeat EGD 10/5/23 revealed a 5 cm hiatal hernia, a single small mucosal nodule at the GE junction and Valdez's esophagus confirmed on biopsy. The patient is doing well on once daily PPI. Recall EGD is recommended in 1 year.    -Continue once daily PPI  -Repeat EGD 1 year    3. Personal history of colon polyps   Colonoscopy 5/19/23 with 1 subcentimeter colon polyp removed and small internal hemorrhoids however prep was inadequate and although the cecum was reached the procedure was quickly terminated thereafter given difficulty with patient's sedation and moving during the procedure. Repeat colonoscopy 10/5/23 done in the hospital after a two day prep revealed 5 <1cm colon polyps removed, most of which were adenomas. Recall recommended 3 years. -Repeat colonoscopy 10/2026 or sooner if clinically indicated    Follow-up 6 months    ______________________________________________________________________    History of Present Illness  Shaunna Elder is a 57 y.o. male here for follow up evaluation of GERD and colon cancer screening. The patient underwent initial EGD and colonoscopy in May of this year. EGD revealed grade D esophagitis and colonoscopy revealed 1 polyp but after the cecum was reached was quickly terminated secondary to difficulty with food dysphagia and inadequate prep. The patient underwent repeat EGD and colonoscopy in October. This revealed healed esophagitis but did confirm Valdez's esophagus and colonoscopy revealed 5 subcentimeter colon polyps all removed. The patient is currently on omeprazole 40 mg once daily and doing well.   No GI symptoms or complaints. Review of Systems   Constitutional:  Negative for activity change, appetite change, chills, fatigue, fever and unexpected weight change. Gastrointestinal:  Negative for abdominal distention, abdominal pain, anal bleeding, blood in stool, constipation, diarrhea, nausea, rectal pain and vomiting.        Past Medical History  Past Medical History:   Diagnosis Date    Colon polyp     Diabetes ([address])     Hyperlipidemia     YURIY on CPAP        Past Social history  Past Surgical History:   Procedure Laterality Date    COLONOSCOPY      KS LAPS ABD PRTM&OMENTUM DX W/WO SPEC BR/WA SPX N/A 08/16/2022    Procedure: LAPAROSCOPY DIAGNOSTIC, LYSIS OF ADHESIONS, REDUCTION OF INTERNAL HERNIA;  Surgeon: Tiffany Fox MD;  Location: [facility];  Service: General    VASECTOMY       Social History     Socioeconomic History    Marital status: /Civil Union     Spouse name: Not on file    Number of children: Not on file    Years of education: Not on file    Highest education level: Not on file   Occupational History    Not on file   Tobacco Use    Smoking status: Former     Packs/day: 3.00     Types: Cigarettes    Smokeless tobacco: Never    Tobacco comments:     8 YRS AGO   Vaping Use    Vaping Use: Never used   Substance and Sexual Activity    Alcohol use: Never    Drug use: Never    Sexual activity: Yes   Other Topics Concern    Not on file   Social History Narrative    Not on file     Social Determinants of Health     Financial Resource Strain: Not on file   Food Insecurity: Not on file   Transportation Needs: Not on file   Physical Activity: Not on file   Stress: Not on file   Social Connections: Not on file   Intimate Partner Violence: Not on file   Housing Stability: Not on file     Social History     Substance and Sexual Activity   Alcohol Use Never     Social History     Substance and Sexual Activity   Drug Use Never     Social History     Tobacco Use   Smoking Status Former    Packs/day: 3.00    Types: Cigarettes   Smokeless Tobacco Never   Tobacco Comments    8 YRS AGO       Past Family History  Family History   Family history unknown: Yes       Current Medications  Current Outpatient Medications   Medication Sig Dispense Refill    albuterol (PROVENTIL HFA,VENTOLIN HFA) 90 mcg/act inhaler INHALE 2 PUFFS EVERY 6 HOURS AS NEEDED FOR WHEEZING (Patient not taking: Reported on 5/12/2023) 18 g 1    atorvastatin (LIPITOR) 20 mg tablet Take 1 tablet (20 mg total) by mouth daily 90 tablet 1    metFORMIN (GLUCOPHAGE) 1000 MG tablet Take 1,000 mg by mouth 2 (two) times a day      omeprazole (PriLOSEC) 40 MG capsule Take 1 capsule (40 mg total) by mouth daily before breakfast 90 capsule 3    polyethylene glycol (GLYCOLAX) 17 GM/SCOOP powder Take as directed as per written office instructions (Patient not taking: Reported on 9/5/2023) 238 g 0    venlafaxine (EFFEXOR-XR) 75 mg 24 hr capsule Take 1 capsule (75 mg total) by mouth daily with breakfast 30 capsule 5     No current facility-administered medications for this visit. Allergies  No Known Allergies      The following portions of the patient's history were reviewed and updated as appropriate: allergies, current medications, past medical history, past social history, past surgical history and problem list.      Vitals  There were no vitals filed for this visit. Physical Exam  Constitutional   General appearance: Patient is seated and in no acute distress, well appearing and well nourished. Head and Face   Head and face: Normal.    Eyes   Conjunctiva and lids: No erythema, swelling or discharge. Anicteric. Ears, Nose, Mouth, and Throat   Hearing: Normal.    Neck: Supple, trachea midline. Pulmonary   Respiratory effort: No increased work of breathing or signs of respiratory distress.     Lungs: Clear to ascultation, no wheezes, rhonchi, or rales  Cardiovascular   Heart: Regular rate and rhythm, no murmurs gallops or rubs   Examination of extremities for edema and/or varicosities: Normal.    Abdomen   Abdomen: Soft, non-tender, no masses, no organomegaly. Normal bowel sounds. Musculoskeletal   Gait and station: Normal   Skin   Skin and subcutaneous tissue: Warm, dry, and intact. No visible jaundice, lesions or rashes. Psychiatric   Judgment and insight: Normal  Recent and remote memory:  Normal  Mood and affect: Normal       Results  No visits with results within 1 Day(s) from this visit. Latest known visit with results is:   Hospital Outpatient Visit on 10/05/2023   Component Date Value    Case Report 10/05/2023                      Value:Surgical Pathology Report                         Case: [id number]                                   Authorizing Provider:  Frieda Wang MD       Collected:           10/05/2023 0736              Ordering Location:     [facility] Received:            10/05/2023 1324                                     [facility]                                                              Pathologist:           Vanessa Ordonez MD                                                          Specimens:   A) - Stomach, gastric bx r/o h pylori                                                               B) - Esophagogastric junction, GE junction r/o barretts bx at 35 cm                                 C) - Esophagogastric junction, nodule bx GE junction                                                D) - Large Intestine, Sigmoid Colon, sigmoid polyp cold snare                                       E) - Large Intestine, Right/Ascending Colon, cecal polyp x 2 cold snare                                                       F) - Large Intestine, Left/Descending Colon, polyp , cold snare                            Final Diagnosis 10/05/2023                      Value: This result contains rich text formatting which cannot be displayed here. Additional Information 10/05/2023                      Value: This result contains rich text formatting which cannot be displayed here. Synoptic Checklist 10/05/2023                      Value:                            COLON/RECTUM POLYP FORM - GI - All Specimens                                                                                     :    Serrated Adenoma      Gross Description 10/05/2023                      Value: This result contains rich text formatting which cannot be displayed here. POC Glucose 10/05/2023 121        Radiology Results  EGD    Addendum Date: 10/9/2023 Addendum:   [address] [facility] [address] [facility] [address] [phone] DATE OF SERVICE: 10/05/23 PHYSICIAN(S): Attending: Qiana Shabazz MD Fellow: No Staff Documented INDICATION: Gastroesophageal reflux disease with esophagitis without hemorrhage POST-OP DIAGNOSIS: See the impression below. PREPROCEDURE: Informed consent was obtained for the procedure, including sedation. Risks of perforation, hemorrhage, adverse drug reaction and aspiration were discussed. The patient was placed in the left lateral decubitus position. Patient was explained about the risks and benefits of the procedure. Risks including but not limited to bleeding, infection, and perforation were explained in detail. Also explained about less than 100% sensitivity with the exam and other alternatives. PROCEDURE: EGD DETAILS OF PROCEDURE: Patient was taken to the procedure room where a time out was performed to confirm correct patient and correct procedure. The patient underwent monitored anesthesia care, which was administered by an anesthesia professional. The patient's blood pressure, heart rate, level of consciousness, respirations and oxygen were monitored throughout the procedure. The scope was advanced to the second part of the duodenum. Retroflexion was performed in the fundus. The patient experienced no blood loss. The procedure was not difficult.  The patient tolerated the procedure well. There were no apparent adverse events. ANESTHESIA INFORMATION: ASA: II Anesthesia Type: Anesthesia type not filed in the log. MEDICATIONS: No administrations occurring from 0727 to 0744 on 10/05/23 FINDINGS: C0M1 Valdez's esophagus observed with 2 tongues and an associated lesion without using a distal attachment cap. The diaphragmatic impression was 41 cm from the incisors, Z-line was 36 cm from the incisors and the top of gastric folds was 36 cm from the incisors. 0 islands were noted; performed cold forceps biopsy Single small, mucosal nodule was observed in the GE junction 5 cm hiatal hernia - GE junction 36 cm from the incisors, diaphragmatic impression 41 cm from the incisors:  Hill classification: Grade IV Mild, generalized erythematous mucosa in the stomach; performed cold forceps biopsy The duodenum appeared normal. SPECIMENS: ID Type Source Tests Collected by Time Destination 1 : gastric bx r/o h pylori Tissue Stomach TISSUE EXAM David Varela MD 10/5/2023  7:36 AM  2 : GE junction r/o barretts bx at 35 cm Tissue Esophagogastric junction TISSUE EXAM David Varela MD 10/5/2023  7:38 AM  3 : nodule bx GE junction Tissue Esophagogastric junction TISSUE EXAM David Varela MD 10/5/2023  7:40 AM  IMPRESSION: C0M1 Valdez's esophagus with 2 tongues of salmon-colored mucosa extending 1 cm above the Z-line. One of the tongues had a inflammatory appearing nodule at 35 cm which was biopsied.   Single small, mucosal nodule was observed in the GE junction 5 cm hiatal hernia Mild erythematous mucosa in the stomach; performed cold forceps biopsy The duodenum appeared normal. RECOMMENDATION:  Await pathology results  Schedule repeat EGD  Valdez's esophagus surveillance  Pending results of biopsy repeat EGD in 1 years given Valdez's esophagus and nodule visualized on this exam. Continue daily PPI Proceed with colonoscopy   David Varela MD     Result Date: 10/9/2023  Narrative: Paxton Morton formatting from the original result was not included. [address] [facility] [address] [facility] [id number] [phone] DATE OF SERVICE: 10/05/23 PHYSICIAN(S): Attending: Marialuisa Ramos MD Fellow: No Staff Documented INDICATION: Gastroesophageal reflux disease with esophagitis without hemorrhage POST-OP DIAGNOSIS: See the impression below. PREPROCEDURE: Informed consent was obtained for the procedure, including sedation. Risks of perforation, hemorrhage, adverse drug reaction and aspiration were discussed. The patient was placed in the left lateral decubitus position. Patient was explained about the risks and benefits of the procedure. Risks including but not limited to bleeding, infection, and perforation were explained in detail. Also explained about less than 100% sensitivity with the exam and other alternatives. PROCEDURE: EGD DETAILS OF PROCEDURE: Patient was taken to the procedure room where a time out was performed to confirm correct patient and correct procedure. The patient underwent monitored anesthesia care, which was administered by an anesthesia professional. The patient's blood pressure, heart rate, level of consciousness, respirations and oxygen were monitored throughout the procedure. The scope was advanced to the second part of the duodenum. Retroflexion was performed in the fundus. The patient experienced no blood loss. The procedure was not difficult. The patient tolerated the procedure well. There were no apparent adverse events. ANESTHESIA INFORMATION: ASA: II Anesthesia Type: Anesthesia type not filed in the log. MEDICATIONS: No administrations occurring from 0727 to 0744 on 10/05/23 FINDINGS: C0M1 Valdez's esophagus observed with 2 tongues and an associated lesion without using a distal attachment cap.  The diaphragmatic impression was 41 cm from the incisors, Z-line was 36 cm from the incisors and the top of gastric folds was 36 cm from the incisors. 0 islands were noted; performed cold forceps biopsy Single small, mucosal nodule was observed in the GE junction 5 cm hiatal hernia - GE junction 36 cm from the incisors, diaphragmatic impression 41 cm from the incisors:  Hill classification: Grade IV Mild, generalized erythematous mucosa in the stomach; performed cold forceps biopsy The duodenum appeared normal. SPECIMENS: ID Type Source Tests Collected by Time Destination 1 : gastric bx r/o h pylori Tissue Stomach TISSUE EXAM Yelena Blanco MD 10/5/2023  7:36 AM  2 : GE junction r/o barretts bx at 35 cm Tissue Esophagogastric junction TISSUE EXAM Yelena Blanco MD 10/5/2023  7:38 AM  3 : nodule bx GE junction Tissue Esophagogastric junction TISSUE EXAM Yelena Blanco MD 10/5/2023  7:40 AM      Impression: C8P0 Valdez's esophagus with 2 tongues of salmon-colored mucosa extending 1 cm above the Z-line. One of the tongues had a inflammatory appearing nodule at 35 cm which was biopsied. Single small, mucosal nodule was observed in the GE junction 5 cm hiatal hernia Mild erythematous mucosa in the stomach; performed cold forceps biopsy The duodenum appeared normal. RECOMMENDATION:  Await pathology results  Schedule repeat EGD  Valdez's esophagus surveillance  Pending results of biopsy repeat EGD in 1 to 3 years given Valdez's esophagus and nodule visualized on this exam. Continue daily PPI Proceed with colonoscopy   Yelena Blanco MD     Colonoscopy    Result Date: 10/5/2023  Narrative: Table formatting from the original result was not included. [address] [facility] [address] [facility] [address] [phone] DATE OF SERVICE: 10/05/23 PHYSICIAN(S): Attending: Yelena Blanco MD Fellow: No Staff Documented INDICATION: Screening for colon cancer POST-OP DIAGNOSIS: See the impression below. HISTORY: Prior colonoscopy: Less than 3 years ago.  It is being repeated at an interval of less than 3 years because: Last colonoscopy had inadequate bowel prep BOWEL PREPARATION: Miralax/Dulcolax PREPROCEDURE: Informed consent was obtained for the procedure, including sedation. Risks including but not limited to bleeding, infection, perforation, adverse drug reaction and aspiration were explained in detail. Also explained about less than 100% sensitivity with the exam and other alternatives. The patient was placed in the left lateral decubitus position. Procedure: Colonoscopy DETAILS OF PROCEDURE: Patient was taken to the procedure room where a time out was performed to confirm correct patient and correct procedure. The patient underwent monitored anesthesia care, which was administered by an anesthesia professional. The patient's blood pressure, heart rate, level of consciousness, oxygen, respirations and ECG were monitored throughout the procedure. A digital rectal exam was performed. The scope was introduced through the anus and advanced to the cecum. Retroflexion was performed in the rectum. The quality of bowel preparation was evaluated using the [facility] Bowel Preparation Scale with scores of: right colon = 2, transverse colon = 3, left colon = 3. The total BBPS score was 8. Bowel prep was adequate. The patient experienced no blood loss. The procedure was not difficult. The patient tolerated the procedure well. There were no apparent adverse events. ANESTHESIA INFORMATION: ASA: II Anesthesia Type: Anesthesia type not filed in the log.  MEDICATIONS: No administrations occurring from 0727 to 0811 on 10/05/23 FINDINGS: Two polyps measuring smaller than 5 mm in the cecum; completely removed en bloc by cold snare and retrieved specimen One sessile polyp measuring 5-9 mm in the descending colon; completely removed en bloc by cold snare and retrieved specimen Two sessile polyps measuring 5-9 mm in the sigmoid colon; completely removed en bloc by cold snare and retrieved specimen Internal small hemorrhoids Otherwise normal colonic mucosa EVENTS: Procedure Events Event Event Time ENDO CECUM REACHED 10/5/2023  7:59 AM ENDO SCOPE OUT TIME 10/5/2023  8:10 AM SPECIMENS: ID Type Source Tests Collected by Time Destination 1 : gastric bx r/o h pylori Tissue Stomach TISSUE EXAM Audie Ramirez MD 10/5/2023  7:36 AM  2 : GE junction r/o barretts bx at 35 cm Tissue Esophagogastric junction TISSUE EXAM Audie Ramirez MD 10/5/2023  7:38 AM  3 : nodule bx GE junction Tissue Esophagogastric junction TISSUE EXAM Audie Ramirez MD 10/5/2023  7:40 AM  4 : sigmoid polyp cold snare Tissue Large Intestine, Sigmoid Colon TISSUE EXAM Audie Ramirez MD 10/5/2023  7:55 AM  5 : cecal polyp x 2 cold snare Tissue Large Intestine, Right/Ascending Colon TISSUE EXAM Audie Ramirez MD 10/5/2023  7:59 AM  6 : polyp , cold snare Tissue Large Intestine, Left/Descending Colon TISSUE EXAM Audie Ramirez MD 10/5/2023  8:07 AM  EQUIPMENT: Colonoscope -[id number] ENDOCUFF VISION LRG GREEN ID 11.2     Impression: 5 subcentimeter polyps were removed with cold snare Small hemorrhoids Otherwise normal colonic mucosa RECOMMENDATION:  Await pathology results  Repeat colonoscopy in 3 years  Personal history of colon polyps    Audie Ramirez MD       Orders  No orders of the defined types were placed in this encounter.

## 2023-10-18 ENCOUNTER — OFFICE VISIT (OUTPATIENT)
Dept: URGENT CARE | Facility: CLINIC | Age: 48
End: 2023-10-18
Payer: COMMERCIAL

## 2023-10-18 VITALS
TEMPERATURE: 97.9 F | WEIGHT: 270 LBS | OXYGEN SATURATION: 98 % | HEART RATE: 74 BPM | HEIGHT: 69 IN | RESPIRATION RATE: 20 BRPM | BODY MASS INDEX: 39.99 KG/M2 | SYSTOLIC BLOOD PRESSURE: 128 MMHG | DIASTOLIC BLOOD PRESSURE: 74 MMHG

## 2023-10-18 DIAGNOSIS — J40 BRONCHITIS: Primary | ICD-10-CM

## 2023-10-18 LAB
SARS-COV-2 AG UPPER RESP QL IA: NEGATIVE
VALID CONTROL: NORMAL

## 2023-10-18 PROCEDURE — 87636 SARSCOV2 & INF A&B AMP PRB: CPT | Performed by: PHYSICIAN ASSISTANT

## 2023-10-18 PROCEDURE — 87811 SARS-COV-2 COVID19 W/OPTIC: CPT | Performed by: PHYSICIAN ASSISTANT

## 2023-10-18 PROCEDURE — S9088 SERVICES PROVIDED IN URGENT: HCPCS | Performed by: PHYSICIAN ASSISTANT

## 2023-10-18 PROCEDURE — 99213 OFFICE O/P EST LOW 20 MIN: CPT | Performed by: PHYSICIAN ASSISTANT

## 2023-10-18 RX ORDER — BENZONATATE 200 MG/1
200 CAPSULE ORAL 3 TIMES DAILY PRN
Qty: 20 CAPSULE | Refills: 0 | Status: SHIPPED | OUTPATIENT
Start: 2023-10-18

## 2023-10-18 NOTE — PROGRESS NOTES
[facility]    NAME: Almaz Last is a 57 y.o. male  : 1975    MRN: [id number]  DATE: 2023  TIME: 12:14 PM    Assessment and Plan   Bronchitis [J40]  1. Bronchitis  Poct Covid 19 Rapid Antigen Test    Covid/Flu-Office Collect    benzonatate (TESSALON) 200 MG capsule          Patient Instructions     Patient Instructions   Provider does not see any evidence of pneumonia at this time. Your illness does appear to be more consistent with viral infection. Continue Mucinex. Push fluids. You may do Advil or ibuprofen for headache, body aches and pains for comfort as needed. Strongly encourage getting plenty of rest.  COVID/influenza testing initiated. Those results take approximately 24 to 48 hours to return. You may access those results through your [email or web address]. Both COVID and influenza are viral respiratory illnesses. There are several other viral illnesses circulating in the area that can cause similar overlapping symptoms. Provider would expect symptoms to start turning the corner within approximately 7 to 10 days, however cough may linger for few weeks. Prescription cough tablet to use as needed. May take with Mucinex. Acute Bronchitis   AMBULATORY CARE:   Acute bronchitis  is swelling and irritation in your lungs. It is usually caused by a virus and most often happens in the winter. Bronchitis may also be caused by bacteria or by a chemical irritant, such as smoke. Common symptoms:   Cough that lasts up to 3 weeks    Runny or stuffy nose    Hoarseness, sore throat    Fever    Feeling more tired than usual, and body aches    Wheezing or pain when you breathe or cough    Seek care immediately if:   You cough up blood. Your lips or fingernails turn blue. You feel like you are not getting enough air when you breathe. Call your doctor if:   Your symptoms do not go away or get worse, even after treatment.     Your cough does not get better within 4 weeks.    You have questions or concerns about your condition or care. Medicines: You may need any of the following:  Cough suppressants  decrease your urge to cough. Decongestants  help loosen mucus in your lungs and make it easier to cough up. This can help you breathe easier. Inhalers  may be given. Your healthcare provider may give you one or more inhalers to help you breathe easier and cough less. An inhaler gives you medicine to open your airways. Ask your healthcare provider to show you how to use your inhaler correctly. Antiviral medicine  treats infections caused by a virus. Antibiotics  may be given if your bronchitis is caused by bacteria or if you have lung condition. Acetaminophen  decreases pain and fever. It is available without a doctor's order. Ask how much to take and how often to take it. Follow directions. Read the labels of all other medicines you are using to see if they also contain acetaminophen, or ask your doctor or pharmacist. Acetaminophen can cause liver damage if not taken correctly. NSAIDs  help decrease swelling and pain or fever. This medicine is available with or without a doctor's order. NSAIDs can cause stomach bleeding or kidney problems in certain people. If you take blood thinner medicine, always ask your healthcare provider if NSAIDs are safe for you. Always read the medicine label and follow directions. Self-care:   Drink liquids as directed. You may need to drink more liquids than usual to stay hydrated. Ask how much liquid to drink each day and which liquids are best for you. Use a cool mist humidifier. This increases air moisture in your home. This may make it easier for you to breathe and help decrease your cough. Get more rest.  Rest helps your body to heal. Slowly start to do more each day. Rest when you feel it is needed. Prevent acute bronchitis:       Ask about vaccines you may need.   Get a flu vaccine each year as soon as recommended, usually in September or October. Ask your healthcare provider if you should also get a pneumonia or COVID-19 vaccine. Your healthcare provider can tell you if you should also get other vaccines, and when to get them. Prevent the spread of germs. You can decrease your risk for acute bronchitis and other illnesses by doing the following:    Wash your hands often with soap and water. Carry germ-killing hand lotion or gel with you. You can use the lotion or gel to clean your hands when soap and water are not available. Do not touch your eyes, nose, or mouth unless you have washed your hands first.    Always cover your mouth when you cough to prevent the spread of germs. It is best to cough into a tissue or your shirt sleeve instead of into your hand. Ask those around you to cover their mouths when they cough. Try to avoid people who have a cold or the flu. If you are sick, stay away from others as much as possible. Avoid irritants in the air. Avoid chemicals, fumes, and dust. Wear a face mask if you must work around dust or fumes. Stay inside on days when air pollution levels are high. If you have allergies, stay inside when pollen counts are high. Do not use aerosol products, such as spray-on deodorant, bug spray, and hair spray. Do not smoke or be around others who are smoking. Nicotine and other chemicals in cigarettes and cigars can cause lung damage. Ask your healthcare provider for information if you currently smoke and need help to quit. E-cigarettes or smokeless tobacco still contain nicotine. Talk to your healthcare provider before you use these products. Follow up with your doctor as directed:  Write down questions you have so you will remember to ask them during your follow-up visits. © Copyright Radha Jerry 2023 Information is for End User's use only and may not be sold, redistributed or otherwise used for commercial purposes.   The above information is an  only. It is not intended as medical advice for individual conditions or treatments. Talk to your doctor, nurse or pharmacist before following any medical regimen to see if it is safe and effective for you. Chief Complaint     Chief Complaint   Patient presents with    Cold Like Symptoms     Patient with HA and cough for 4 days. Co worker has pneumonia so he was concerned and wanted pneumonia testing       History of Present Illness   Rut Hess presents to the clinic c/o  51-year-old male comes in after finding out that his son's mother-in-law was recently diagnosed with pneumonia. His son and family have all been sick. Son stayed with him recently as he helps him with his business. Concerned that maybe he has gotten pneumonia 2. Patient started with cough about 4 days ago. Denies any runny nose or nasal congestion. He has had some postnasal drip. Also has headache and some malaise and fatigue today. No fever or chills. No diaphoresis. Denies any chest pain or shortness of breath. Cough is more productive first thing in the morning with some grayish phlegm. No hemoptysis. Throughout day cough is drier. Sleeping without difficulty. Taking Mucinex. Denies history of asthma or pneumonia. Quit smoking over 10 years ago. Denies any recent travel. 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 Review of Systems   Review of Systems   Constitutional:  Positive for activity change, appetite change and fatigue. Negative for chills, diaphoresis and fever. HENT:  Positive for postnasal drip. Negative for congestion, rhinorrhea, sinus pressure, sinus pain, sneezing, sore throat, trouble swallowing and voice change. Respiratory:  Positive for cough. Negative for choking, chest tightness, shortness of breath, wheezing and stridor. Cardiovascular:  Negative for chest pain, palpitations and leg swelling. Musculoskeletal:  Positive for myalgias.    Neurological:  Positive for headaches. Current Medications     Long-Term Medications   Medication Sig Dispense Refill    atorvastatin (LIPITOR) 20 mg tablet Take 1 tablet (20 mg total) by mouth daily 90 tablet 1    metFORMIN (GLUCOPHAGE) 1000 MG tablet Take 1,000 mg by mouth 2 (two) times a day      omeprazole (PriLOSEC) 40 MG capsule Take 1 capsule (40 mg total) by mouth daily before breakfast 90 capsule 3    venlafaxine (EFFEXOR-XR) 75 mg 24 hr capsule Take 1 capsule (75 mg total) by mouth daily with breakfast 30 capsule 5    polyethylene glycol (GLYCOLAX) 17 GM/SCOOP powder Take as directed as per written office instructions (Patient not taking: Reported on 9/5/2023) 238 g 0       Current Allergies     Allergies as of 10/18/2023    (No Known Allergies)          The following portions of the patient's history were reviewed and updated as appropriate: allergies, current medications, past family history, past medical history, past social history, past surgical history and problem list.  Past Medical History:   Diagnosis Date    Colon polyp     Diabetes ([address])     Hyperlipidemia     YURIY on CPAP      Past Surgical History:   Procedure Laterality Date    COLONOSCOPY      SD LAPS ABD PRTM&OMENTUM DX W/WO SPEC BR/WA SPX N/A 08/16/2022    Procedure: LAPAROSCOPY DIAGNOSTIC, LYSIS OF ADHESIONS, REDUCTION OF INTERNAL HERNIA;  Surgeon: Paxton Ybarra MD;  Location: [facility];  Service: General    VASECTOMY       Family History   Family history unknown: Yes       Objective   /74   Pulse 74   Temp 97.9 °F (36.6 °C) (Tympanic)   Resp 20   Ht 5' 9" (1.753 m)   Wt 122 kg (270 lb)   SpO2 98%   BMI 39.87 kg/m²   No LMP for male patient. Physical Exam     Physical Exam  Vitals and nursing note reviewed. Constitutional:       General: He is not in acute distress. Appearance: He is well-developed. He is obese. He is ill-appearing. He is not toxic-appearing or diaphoretic. Comments: Well-developed well-nourished.   Appears mildly ill. No trismus or dyspnea on exertion. HENT:      Head: Normocephalic and atraumatic. Right Ear: Tympanic membrane, ear canal and external ear normal.      Left Ear: Tympanic membrane, ear canal and external ear normal.      Nose: No congestion or rhinorrhea. Mouth/Throat:      Mouth: Mucous membranes are moist.      Pharynx: Posterior oropharyngeal erythema present. No oropharyngeal exudate. Comments: Cobblestoning posterior pharynx with redness. Uvula midline. No swelling uvula. Eyes:      General: No scleral icterus. Right eye: No discharge. Left eye: No discharge. Conjunctiva/sclera: Conjunctivae normal.      Pupils: Pupils are equal, round, and reactive to light. Cardiovascular:      Rate and Rhythm: Normal rate and regular rhythm. Heart sounds: Normal heart sounds. No murmur heard. No friction rub. No gallop. Pulmonary:      Effort: Pulmonary effort is normal. No respiratory distress. Breath sounds: Normal breath sounds. No stridor. No wheezing, rhonchi or rales. Musculoskeletal:      Cervical back: Normal range of motion and neck supple. No rigidity or tenderness. Lymphadenopathy:      Cervical: No cervical adenopathy. Skin:     General: Skin is warm and dry. Findings: No rash. Neurological:      Mental Status: He is alert and oriented to person, place, and time.    Psychiatric:         Mood and Affect: Mood normal.         Behavior: Behavior normal.

## 2023-10-18 NOTE — PATIENT INSTRUCTIONS
Provider does not see any evidence of pneumonia at this time. Your illness does appear to be more consistent with viral infection. Continue Mucinex. Push fluids. You may do Advil or ibuprofen for headache, body aches and pains for comfort as needed. Strongly encourage getting plenty of rest.  COVID/influenza testing initiated. Those results take approximately 24 to 48 hours to return. You may access those results through your [facility] account. Both COVID and influenza are viral respiratory illnesses. There are several other viral illnesses circulating in the area that can cause similar overlapping symptoms. Provider would expect symptoms to start turning the corner within approximately 7 to 10 days, however cough may linger for few weeks. Prescription cough tablet to use as needed. May take with Mucinex. Acute Bronchitis   AMBULATORY CARE:   Acute bronchitis  is swelling and irritation in your lungs. It is usually caused by a virus and most often happens in the winter. Bronchitis may also be caused by bacteria or by a chemical irritant, such as smoke. Common symptoms:   Cough that lasts up to 3 weeks    Runny or stuffy nose    Hoarseness, sore throat    Fever    Feeling more tired than usual, and body aches    Wheezing or pain when you breathe or cough    Seek care immediately if:   You cough up blood. Your lips or fingernails turn blue. You feel like you are not getting enough air when you breathe. Call your doctor if:   Your symptoms do not go away or get worse, even after treatment. Your cough does not get better within 4 weeks. You have questions or concerns about your condition or care. Medicines: You may need any of the following:  Cough suppressants  decrease your urge to cough. Decongestants  help loosen mucus in your lungs and make it easier to cough up. This can help you breathe easier. Inhalers  may be given.  Your healthcare provider may give you one or more inhalers to help you breathe easier and cough less. An inhaler gives you medicine to open your airways. Ask your healthcare provider to show you how to use your inhaler correctly. Antiviral medicine  treats infections caused by a virus. Antibiotics  may be given if your bronchitis is caused by bacteria or if you have lung condition. Acetaminophen  decreases pain and fever. It is available without a doctor's order. Ask how much to take and how often to take it. Follow directions. Read the labels of all other medicines you are using to see if they also contain acetaminophen, or ask your doctor or pharmacist. Acetaminophen can cause liver damage if not taken correctly. NSAIDs  help decrease swelling and pain or fever. This medicine is available with or without a doctor's order. NSAIDs can cause stomach bleeding or kidney problems in certain people. If you take blood thinner medicine, always ask your healthcare provider if NSAIDs are safe for you. Always read the medicine label and follow directions. Self-care:   Drink liquids as directed. You may need to drink more liquids than usual to stay hydrated. Ask how much liquid to drink each day and which liquids are best for you. Use a cool mist humidifier. This increases air moisture in your home. This may make it easier for you to breathe and help decrease your cough. Get more rest.  Rest helps your body to heal. Slowly start to do more each day. Rest when you feel it is needed. Prevent acute bronchitis:       Ask about vaccines you may need. Get a flu vaccine each year as soon as recommended, usually in September or October. Ask your healthcare provider if you should also get a pneumonia or COVID-19 vaccine. Your healthcare provider can tell you if you should also get other vaccines, and when to get them. Prevent the spread of germs.   You can decrease your risk for acute bronchitis and other illnesses by doing the following:    Wash your hands often with soap and water. Carry germ-killing hand lotion or gel with you. You can use the lotion or gel to clean your hands when soap and water are not available. Do not touch your eyes, nose, or mouth unless you have washed your hands first.    Always cover your mouth when you cough to prevent the spread of germs. It is best to cough into a tissue or your shirt sleeve instead of into your hand. Ask those around you to cover their mouths when they cough. Try to avoid people who have a cold or the flu. If you are sick, stay away from others as much as possible. Avoid irritants in the air. Avoid chemicals, fumes, and dust. Wear a face mask if you must work around dust or fumes. Stay inside on days when air pollution levels are high. If you have allergies, stay inside when pollen counts are high. Do not use aerosol products, such as spray-on deodorant, bug spray, and hair spray. Do not smoke or be around others who are smoking. Nicotine and other chemicals in cigarettes and cigars can cause lung damage. Ask your healthcare provider for information if you currently smoke and need help to quit. E-cigarettes or smokeless tobacco still contain nicotine. Talk to your healthcare provider before you use these products. Follow up with your doctor as directed:  Write down questions you have so you will remember to ask them during your follow-up visits. © Copyright Lakeisha Eng 2023 Information is for End User's use only and may not be sold, redistributed or otherwise used for commercial purposes. The above information is an  only. It is not intended as medical advice for individual conditions or treatments. Talk to your doctor, nurse or pharmacist before following any medical regimen to see if it is safe and effective for you.

## 2023-10-19 LAB
FLUAV RNA RESP QL NAA+PROBE: NEGATIVE
FLUBV RNA RESP QL NAA+PROBE: NEGATIVE
SARS-COV-2 RNA RESP QL NAA+PROBE: NEGATIVE

## 2023-11-10 DIAGNOSIS — F41.9 ANXIETY: ICD-10-CM

## 2023-11-10 RX ORDER — VENLAFAXINE HYDROCHLORIDE 75 MG/1
CAPSULE, EXTENDED RELEASE ORAL
Qty: 30 CAPSULE | Refills: 5 | Status: SHIPPED | OUTPATIENT
Start: 2023-11-10

## 2023-12-07 ENCOUNTER — OFFICE VISIT (OUTPATIENT)
Dept: SLEEP CENTER | Facility: CLINIC | Age: 48
End: 2023-12-07
Payer: COMMERCIAL

## 2023-12-07 VITALS
HEIGHT: 69 IN | BODY MASS INDEX: 39.99 KG/M2 | WEIGHT: 270 LBS | DIASTOLIC BLOOD PRESSURE: 82 MMHG | SYSTOLIC BLOOD PRESSURE: 124 MMHG

## 2023-12-07 DIAGNOSIS — G47.33 OBSTRUCTIVE SLEEP APNEA: Primary | ICD-10-CM

## 2023-12-07 PROBLEM — E66.812 CLASS 2 SEVERE OBESITY DUE TO EXCESS CALORIES WITH SERIOUS COMORBIDITY AND BODY MASS INDEX (BMI) OF 37.0 TO 37.9 IN ADULT (HCC): Status: RESOLVED | Noted: 2022-06-28 | Resolved: 2023-12-07

## 2023-12-07 PROBLEM — E66.01 CLASS 2 SEVERE OBESITY DUE TO EXCESS CALORIES WITH SERIOUS COMORBIDITY AND BODY MASS INDEX (BMI) OF 37.0 TO 37.9 IN ADULT: Status: RESOLVED | Noted: 2022-06-28 | Resolved: 2023-12-07

## 2023-12-07 PROCEDURE — 99214 OFFICE O/P EST MOD 30 MIN: CPT | Performed by: PHYSICIAN ASSISTANT

## 2023-12-07 NOTE — ASSESSMENT & PLAN NOTE
Patient was initially diagnosed with obstructive sleep apnea in 2011. He has since gained 90 pounds. I ordered a diagnostic sleep study today to reevaluate and reconfirm his diagnosis of obstructive sleep apnea. After the study is completed, I will plan to order him a new CPAP machine. He will then follow-up 31 to 90 days after receiving his new CPAP. In the meantime, he will continue to use his current CPAP nightly. He is using it 100% of the time greater than 4 hours 100% of the time with a residual AHI of 3.5. Order for new supplies was placed today.

## 2023-12-07 NOTE — ASSESSMENT & PLAN NOTE
We discussed that obesity puts him at higher risk for obstructive sleep apnea. We discussed with significant weight loss this can improve and sometimes even resolve obstructive sleep apnea.   Patient states he is working on a diet and exercise program.

## 2023-12-07 NOTE — PROGRESS NOTES
Progress Note - [address] [facility] 57 y.o. male   :1975, MRN: [id number]  2023          Follow Up Evaluation / Problem:     Obstructive Sleep Apnea      Thank you for the opportunity of participating in the evaluation and care of this patient in the Sleep Clinic at [address] [place] [address]. HPI: Shaunna Sheriff is a 57y.o. year old male. The patient presents for follow up of obstructive sleep apnea. He previously saw sleep medicine in [facility], they describe a test in  that showed mild obstructive sleep apnea, AHI 5. At the time of the study, he believes he weighed approximately 180 pounds. He has since had a 90 pound weight gain. He was initially titrated to CPAP of 10 cm H2O which was effective. At his last visit in , his pressure was increased from 9 to 15 cm of H2O to 9 to 20 cm of H2O. He presents to review compliance and effectiveness of treatment.          Current Outpatient Medications:     atorvastatin (LIPITOR) 20 mg tablet, Take 1 tablet (20 mg total) by mouth daily, Disp: 90 tablet, Rfl: 1    metFORMIN (GLUCOPHAGE) 1000 MG tablet, Take 1,000 mg by mouth 2 (two) times a day, Disp: , Rfl:     venlafaxine (EFFEXOR-XR) 75 mg 24 hr capsule, TAKE 1 CAPSULE BY MOUTH DAILY WITH BREAKFAST., Disp: 30 capsule, Rfl: 5    albuterol (PROVENTIL HFA,VENTOLIN HFA) 90 mcg/act inhaler, INHALE 2 PUFFS EVERY 6 HOURS AS NEEDED FOR WHEEZING (Patient not taking: Reported on 2023), Disp: 18 g, Rfl: 1    benzonatate (TESSALON) 200 MG capsule, Take 1 capsule (200 mg total) by mouth 3 (three) times a day as needed for cough (Patient not taking: Reported on 2023), Disp: 20 capsule, Rfl: 0    omeprazole (PriLOSEC) 40 MG capsule, Take 1 capsule (40 mg total) by mouth daily before breakfast (Patient not taking: Reported on 2023), Disp: 90 capsule, Rfl: 3    polyethylene glycol (GLYCOLAX) 17 GM/SCOOP powder, Take as directed as per written office instructions (Patient not taking: Reported on 9/5/2023), Disp: 238 g, Rfl: 0    How likely are you to doze off or fall asleep in the following situations, in contrast to feeling just tired? Sitting and reading: Would never doze  Watching TV: Would never doze  Sitting, inactive in a public place (e.g. a theatre or a meeting): Would never doze  As a passenger in a car for an hour without a break: Would never doze  Lying down to rest in the afternoon when circumstances permit: Moderate chance of dozing  Sitting and talking to someone: Would never doze  Sitting quietly after a lunch without alcohol: Would never doze  In a car, while stopped for a few minutes in traffic: Would never doze  Total score: 2              Vitals:    12/07/23 1603   BP: 124/82   BP Location: Left arm   Patient Position: Sitting   Cuff Size: Large   Weight: 122 kg (270 lb)   Height: 5' 9" (1.753 m)       Body mass index is 39.87 kg/m². EPWORTH SLEEPINESS SCORE  Total score: 2      Past History Since Last Sleep Center Visit:     Patient began using his DreamStation 2 sent to him by [employer]. He states he uses it every night. He feels he is unable to sleep or function without it. He has no concerns or complaints today. The patient reports that he cleans the equipment appropriately and changes supplies on a regular basis.     The review of systems and following portions of the patient's history were reviewed and updated as appropriate: allergies, current medications, past family history, past medical history, past social history, past surgical history, and problem list.        OBJECTIVE  Equipment set up date: Originally had a DreamStation 1 from [place] prescribed in 2011, got a DreamStation 2 which she began using 6 months ago  PAP Pressure: Nasal AutoPAP using a lower limit of 9 cm and an upper limit of 20 cm of water pressure  Type of mask used: full face  DME Provider: Janice Tapia  Denies aerophagia or AM headaches    Physical Exam:     General Appearance:   Alert, cooperative, no distress, appears stated age, obese        ASSESSMENT / PLAN    1. Obstructive sleep apnea  Assessment & Plan:  Patient was initially diagnosed with obstructive sleep apnea in 2011. He has since gained 90 pounds. I ordered a diagnostic sleep study today to reevaluate and reconfirm his diagnosis of obstructive sleep apnea. After the study is completed, I will plan to order him a new CPAP machine. He will then follow-up 31 to 90 days after receiving his new CPAP. In the meantime, he will continue to use his current CPAP nightly. He is using it 100% of the time greater than 4 hours 100% of the time with a residual AHI of 3.5. Order for new supplies was placed today. Orders:  -     Diagnostic Sleep Study; Future; Expected date: 12/08/2023  -     PAP DME Resupply/Reorder    2. BMI 39.0-39.9,adult  Assessment & Plan:  We discussed that obesity puts him at higher risk for obstructive sleep apnea. We discussed with significant weight loss this can improve and sometimes even resolve obstructive sleep apnea. Patient states he is working on a diet and exercise program.               Counseling / Coordination of Care  I have spent a total time of 32 minutes on 12/07/23 in caring for this patient including Prognosis, Risks and benefits of tx options, Instructions for management, Patient and family education, Importance of tx compliance, Risk factor reductions, Impressions, Counseling / Coordination of care, Documenting in the medical record, Reviewing / ordering tests, medicine, procedures  , and Obtaining or reviewing history  . Today I reviewed the patient's compliance data. he has been able to use the equipment 100% of all days recorded. Average usage was 4 or more hours 100% of all days recorded. The patient uses the equipment for an average of 8 hours and 58 minutes per night.   The estimated AHI is 3.5 abnormal breathing events per hour. The patient feels they benefit from the use of PAP equipment and would like to continue PAP therapy. Response to treatment has been good. A prescription for supplies has been provided to last for the next year. He will continue using this equipment at the settings noted above for the next 12 months. At that timehe will then return for a routine follow-up evaluation. I have asked the patient to contact the Sleep [facility] if he encounters any difficulties prior to that time. The following instructions have been given to the patient today:    Patient Instructions   Continue to use your CPAP nightly. I ordered a new diagnostic sleep study to reevaluate and reconfirm your diagnosis of sleep apnea in order to order you a new CPAP machine. Once your study is completed, I will place an order for a new CPAP and will need to see you back 31 to 90 days after you begin treatment with your new CPAP. Nursing Support:  When: Monday through Friday 7A-5PM except holidays  Where: Our direct line is [phone]. If you are having a true emergency please call 911. In the event that the line is busy or it is after hours please leave a voice message and we will return your call. Please speak clearly, leaving your full name, birth date, best number to reach you and the reason for your call. Medication refills: We will need the name of the medication, the dosage, the ordering provider, whether you get a 30 or 90 day refill, and the pharmacy name and address. Medications will be ordered by the provider only. Nurses cannot call in prescriptions. Please allow 7 days for medication refills. Physician requested updates:  If your provider requested that you call with an update after starting medication, please be ready to provide us the medication and dosage, what time you take your medication, the time you attempt to fall asleep, time you fall asleep, when you wake up, and what time you get out of bed.  Sleep Study Results: We will contact you with sleep study results and/or next steps after the physician has reviewed your testing.          Moises Herzog PA-C  [address]

## 2023-12-07 NOTE — PATIENT INSTRUCTIONS
Continue to use your CPAP nightly. I ordered a new diagnostic sleep study to reevaluate and reconfirm your diagnosis of sleep apnea in order to order you a new CPAP machine. Once your study is completed, I will place an order for a new CPAP and will need to see you back 31 to 90 days after you begin treatment with your new CPAP. Nursing Support:  When: Monday through Friday 7A-5PM except holidays  Where: Our direct line is [phone]. If you are having a true emergency please call 911. In the event that the line is busy or it is after hours please leave a voice message and we will return your call. Please speak clearly, leaving your full name, birth date, best number to reach you and the reason for your call. Medication refills: We will need the name of the medication, the dosage, the ordering provider, whether you get a 30 or 90 day refill, and the pharmacy name and address. Medications will be ordered by the provider only. Nurses cannot call in prescriptions. Please allow 7 days for medication refills. Physician requested updates: If your provider requested that you call with an update after starting medication, please be ready to provide us the medication and dosage, what time you take your medication, the time you attempt to fall asleep, time you fall asleep, when you wake up, and what time you get out of bed. Sleep Study Results: We will contact you with sleep study results and/or next steps after the physician has reviewed your testing.

## 2023-12-08 ENCOUNTER — HOSPITAL ENCOUNTER (OUTPATIENT)
Dept: SLEEP CENTER | Facility: HOSPITAL | Age: 48
Discharge: HOME | End: 2023-12-08
Payer: COMMERCIAL

## 2023-12-08 ENCOUNTER — OFFICE VISIT (OUTPATIENT)
Dept: FAMILY MEDICINE CLINIC | Facility: CLINIC | Age: 48
End: 2023-12-08
Payer: COMMERCIAL

## 2023-12-08 VITALS
HEART RATE: 88 BPM | WEIGHT: 270.6 LBS | OXYGEN SATURATION: 97 % | DIASTOLIC BLOOD PRESSURE: 80 MMHG | HEIGHT: 70 IN | TEMPERATURE: 97.4 F | BODY MASS INDEX: 38.74 KG/M2 | SYSTOLIC BLOOD PRESSURE: 130 MMHG

## 2023-12-08 DIAGNOSIS — Z11.3 SCREENING FOR STDS (SEXUALLY TRANSMITTED DISEASES): ICD-10-CM

## 2023-12-08 DIAGNOSIS — E11.9 TYPE 2 DIABETES MELLITUS WITHOUT COMPLICATION, WITHOUT LONG-TERM CURRENT USE OF INSULIN (HCC): Primary | ICD-10-CM

## 2023-12-08 DIAGNOSIS — G47.33 OBSTRUCTIVE SLEEP APNEA: ICD-10-CM

## 2023-12-08 LAB — SL AMB POCT HEMOGLOBIN AIC: 8.2 (ref ?–6.5)

## 2023-12-08 PROCEDURE — 95810 POLYSOM 6/> YRS 4/> PARAM: CPT | Performed by: PSYCHIATRY & NEUROLOGY

## 2023-12-08 PROCEDURE — 99214 OFFICE O/P EST MOD 30 MIN: CPT

## 2023-12-08 PROCEDURE — 83036 HEMOGLOBIN GLYCOSYLATED A1C: CPT

## 2023-12-08 PROCEDURE — 95810 POLYSOM 6/> YRS 4/> PARAM: CPT

## 2023-12-08 NOTE — PROGRESS NOTES
Name: Elsa Smith      : 1975      MRN: [id number]  Encounter Provider: Kina Carmona PA-C  Encounter Date: 2023   Encounter department: [address] [place] [address]     1. Type 2 diabetes mellitus without complication, without long-term current use of insulin ([facility])  Assessment & Plan:    Lab Results   Component Value Date    HGBA1C 8.2 (A) 2023   Patient and I had a very long discussion today about his diabetes. POCT A1c today showed 8.2, which is up from 5.9 in March. Patient has been on metformin 1000 mg 2 times a day and is currently maxed out on his dose. I discussed with him today that the next step would be adding a different medication and for his diabetes. Patient has tried Ozempic in the past and states it gives him severe nausea and stomach issues. Due to this, I recommended an SGLT2 inhibitor and he was agreeable. We started Jardiance 10 mg once a day, and we can determine if we need to increase to the 25 mg dose at his next appointment in 3 months. He and I had a long discussion about his diet and how to bring down his A1c via lifestyle changes. I instructed him to watch his sugars and carbs, and he is agreeable and is ready to make a change. He was following with weight management, but was dismissed because he skipped appointments. Patient can follow-up with either myself or Dr. Augustine Vigil. Patient will obtain updated labs before his next appointment. Orders:  -     POCT hemoglobin A1c  -     Empagliflozin (Jardiance) 10 MG TABS tablet; Take 1 tablet (10 mg total) by mouth daily  -     CBC and differential; Future; Expected date: 2024  -     Comprehensive metabolic panel; Future; Expected date: 2024  -     HEMOGLOBIN A1C W/ EAG ESTIMATION; Future; Expected date: 2024  -     Ambulatory Referral to Weight Management; Future    2.  BMI 38.0-38.9,adult  Assessment & Plan:  Patient presents today and is ready to make a lifestyle and behavior change considering his weight and his most recent A1c level. He would like to get back in with weight management, I sent the referral today for him to reestablish with them. He is aware that he needs to keep his appointments with them and follow their recommendations in order to continue being established with them. Orders:  -     Ambulatory Referral to Weight Management; Future    3. Screening for STDs (sexually transmitted diseases)  -     Chlamydia/GC amplified DNA by PCR; Future; Expected date: 02/19/2024  -     RPR-Syphilis Screening (Total Syphilis IGG/IGM); Future; Expected date: 02/19/2024  -     HIV 1/2 AB/AG w Reflex [facility] for 2 yr old and above; Future; Expected date: 02/19/2024  -     Chronic Hepatitis Panel; Future; Expected date: 02/19/2024    Patient requested up-to-date screening for STDs, I told him he could get them updated when he receives his updated lab work in 3 months. I am also updating a CBC and CMP to assess for kidney or liver problems that may have caused his symptoms he discussed with me today, however it is highly likely that his symptoms are a result of his uncontrolled diabetes. Patient had dry skin on his hands and feet, which were giving him the most problems with the itchiness-I recommended using moisturizers and antihistamines as needed for the itching. Subjective      Patient presents to the clinic today with concerns of itchy skin, numbness and a feeling of overall tingling throughout his body. Patient noticed the numbness in his lips about a few months ago. He states sometimes he feels like his hair is standing up. He states the numbness and tingling is affecting his sleep. Patient states the itchy skin is not present all the time, but is mostly centered on his hands and his feet. Patient also endorses lightheadedness occasionally. Upon further questioning, patient is wondering if this is due to his diabetes.   He is not getting much exercise and his diet is described as very poor including ice cream, candy, sugary drinks, and a lot of bread. Review of Systems   Constitutional:  Negative for chills, fatigue and fever. HENT:  Negative for congestion, ear discharge, ear pain, sinus pressure and sinus pain. Eyes:  Negative for visual disturbance. Respiratory:  Negative for cough, chest tightness and shortness of breath. Cardiovascular:  Negative for chest pain, palpitations and leg swelling. Gastrointestinal:  Negative for abdominal pain. Endocrine: Negative for polydipsia, polyphagia and polyuria. Genitourinary:  Negative for difficulty urinating and dysuria. Musculoskeletal:  Negative for arthralgias and joint swelling. Skin:  Negative for color change, rash and wound. Itchy hands and feet   Neurological:  Positive for light-headedness and numbness. Negative for dizziness, tremors, syncope, speech difficulty, weakness and headaches. Psychiatric/Behavioral:  Negative for behavioral problems. The patient is not nervous/anxious. Current Outpatient Medications on File Prior to Visit   Medication Sig    atorvastatin (LIPITOR) 20 mg tablet Take 1 tablet (20 mg total) by mouth daily    metFORMIN (GLUCOPHAGE) 1000 MG tablet Take 1,000 mg by mouth 2 (two) times a day    omeprazole (PriLOSEC) 40 MG capsule Take 1 capsule (40 mg total) by mouth daily before breakfast    venlafaxine (EFFEXOR-XR) 75 mg 24 hr capsule TAKE 1 CAPSULE BY MOUTH DAILY WITH BREAKFAST.     [DISCONTINUED] albuterol (PROVENTIL HFA,VENTOLIN HFA) 90 mcg/act inhaler INHALE 2 PUFFS EVERY 6 HOURS AS NEEDED FOR WHEEZING (Patient not taking: Reported on 5/12/2023)    [DISCONTINUED] benzonatate (TESSALON) 200 MG capsule Take 1 capsule (200 mg total) by mouth 3 (three) times a day as needed for cough (Patient not taking: Reported on 12/7/2023)    [DISCONTINUED] polyethylene glycol (GLYCOLAX) 17 GM/SCOOP powder Take as directed as per written office instructions (Patient not taking: Reported on 9/5/2023)       Objective     /80 (BP Location: Left arm, Patient Position: Sitting, Cuff Size: Large)   Pulse 88   Temp (!) 97.4 °F (36.3 °C)   Ht 5' 10.08" (1.78 m)   Wt 123 kg (270 lb 9.6 oz)   SpO2 97%   BMI 38.74 kg/m²     Physical Exam  Vitals and nursing note reviewed. Constitutional:       General: He is not in acute distress. Appearance: He is obese. He is not ill-appearing. HENT:      Head: Normocephalic and atraumatic. Right Ear: Tympanic membrane normal.      Left Ear: Tympanic membrane normal.      Nose: Nose normal.      Mouth/Throat:      Mouth: Mucous membranes are moist.      Pharynx: Oropharynx is clear. Cardiovascular:      Rate and Rhythm: Normal rate and regular rhythm. Pulses: Normal pulses. Heart sounds: Normal heart sounds. No murmur heard. Pulmonary:      Effort: Pulmonary effort is normal. No respiratory distress. Breath sounds: Normal breath sounds. Musculoskeletal:      Right lower leg: No edema. Left lower leg: No edema. Skin:     General: Skin is warm and dry. Capillary Refill: Capillary refill takes less than 2 seconds. Findings: No rash. Comments: Patient's hand and feet appear to have dry skin. No open wounds close hands or feet. No ulcers on his feet. Neurological:      General: No focal deficit present. Mental Status: He is alert and oriented to person, place, and time. Sensory: No sensory deficit. Motor: No weakness.       Gait: Gait normal.   Psychiatric:         Mood and Affect: Mood normal.         Behavior: Behavior normal.         Judgment: Judgment normal.       Krystle Fuentes PA-C

## 2023-12-08 NOTE — ASSESSMENT & PLAN NOTE
Lab Results   Component Value Date    HGBA1C 8.2 (A) 12/08/2023   Patient and I had a very long discussion today about his diabetes. POCT A1c today showed 8.2, which is up from 5.9 in March. The symptoms that the patient is currently having including numbness and tingling throughout his body is most likely a result of his uncontrolled diabetes, which he is agreeable with. Patient has been on metformin 1000 mg 2 times a day and is currently maxed out on his dose. I discussed with him today that the next step would be adding a different medication and for his diabetes. Patient has tried Ozempic in the past and states it gives him severe nausea and stomach issues. Due to this, I recommended an SGLT2 inhibitor and he was agreeable. We started Jardiance 10 mg once a day, and we can determine if we need to increase to the 25 mg dose at his next appointment in 3 months. Patient counseled on side effects with Jardiance, and will let us know if he has any problems with the new medication. He and I had a long discussion about his diet and how to bring down his A1c via lifestyle changes. I instructed him to watch his sugars and carbs, and he is agreeable and is ready to make a change. He was following with weight management, but was dismissed because he skipped appointments. Patient can follow-up with either myself or Dr. Janell Garcia. Patient will obtain updated labs before his next appointment.

## 2023-12-08 NOTE — ASSESSMENT & PLAN NOTE
Patient presents today and is ready to make a lifestyle and behavior change considering his weight and his most recent A1c level. He would like to get back in with weight management, I sent the referral today for him to reestablish with them. He is aware that he needs to keep his appointments with them and follow their recommendations in order to continue being established with them.

## 2023-12-08 NOTE — PATIENT INSTRUCTIONS

## 2023-12-09 NOTE — PROGRESS NOTES
Sleep Study Documentation    Pre-Sleep Study       Sleep testing procedure explained to patient:YES    Patient napped prior to study:NO    [address] worker after 12PM.  Caffeine use:NO    Alcohol:Dayshift workers after 5PM: Alcohol use:NO    Typical day for patient:YES       Study Documentation    Sleep Study Indications: The patient was here for snoring, excessive daytime sleepiness, BMI >30, witnessed apnea, and un-refreshing sleep. Sleep Study: Diagnostic   Snore:Mild  Supplemental O2: no    O2 flow rate (L/min) range N/A  O2 flow rate (L/min) final N/A    Minimum SaO2 89  Baseline SaO2 94    Mode of Therapy:N/A    EKG abnormalities: no     EEG abnormalities: no    Were abnormal behaviors in sleep observed:NO    Is Total Sleep Study Recording Time < 2 hours: N/A    Is Total Sleep Study Recording Time > 2 hours but study is incomplete: yes Patient choose to leave    Is Total Sleep Study Recording Time 6 hours or more but sleep was not obtained: NO    Patient classification: employed       Post-Sleep Study    Medication used at bedtime or during sleep study:NO    Patient reports time it took to fall asleep:30 to 60 minutes    Patient reports waking up during study:3 or more times. Patient reports returning to sleep in greater than 30 minutes. Patient reports sleeping less than 2 hours without dreaming. Does the Patient feel this is a typical night of sleep:worse than usual, patient stated that he has worn a CPAP every night for the last 12 years. He stated he felt like he was having anxiety every time he would start to fall asleep without it. Patient rated sleepiness: Very sleepy or tired    PAP treatment:no.

## 2023-12-11 ENCOUNTER — TELEPHONE (OUTPATIENT)
Dept: BARIATRICS | Facility: CLINIC | Age: 48
End: 2023-12-11

## 2023-12-11 ENCOUNTER — TELEPHONE (OUTPATIENT)
Dept: SLEEP CENTER | Facility: CLINIC | Age: 48
End: 2023-12-11

## 2023-12-11 NOTE — TELEPHONE ENCOUNTER
Rx for PAP resupply sent to [facility] Hank Willie via [address] [place] [address].

## 2023-12-11 NOTE — TELEPHONE ENCOUNTER
[facility] Patient Eligibility and Benefit Details for Surgery  *Not a guarantee of payment    Today's Date: 12/11/2023    Insurance Kevin Ellison  Ins Phone: [phone]    Effective date: 12/5/2023  Term Date: Current    Representative name: Juan Luis Lance. Reference number for call: [id number]    Does the patient have the benefit written on policy: Yes        If applicable: Are revisions covered under this policy: Yes          If patient is considering paying out of pocket, provide information: Financial Counselor/  [phone]    Having health insurance coverage is not a guarantee that your individual plan covers weight-loss surgery or that you will be approved for surgery.

## 2023-12-12 LAB

## 2023-12-14 ENCOUNTER — TELEPHONE (OUTPATIENT)
Dept: BARIATRICS | Facility: CLINIC | Age: 48
End: 2023-12-14

## 2023-12-14 NOTE — TELEPHONE ENCOUNTER
I spoke with patient that I emailed his checklist for telephone visit next week stated if he did not receive it to please contact me so I can email the information again. This checklist will need to be available to review together for this visit.

## 2023-12-19 ENCOUNTER — OFFICE VISIT (OUTPATIENT)
Dept: BARIATRICS | Facility: CLINIC | Age: 48
End: 2023-12-19

## 2023-12-19 VITALS — BODY MASS INDEX: 39.99 KG/M2 | WEIGHT: 270 LBS | HEIGHT: 69 IN

## 2023-12-19 DIAGNOSIS — E11.9 TYPE 2 DIABETES MELLITUS WITHOUT COMPLICATION, WITHOUT LONG-TERM CURRENT USE OF INSULIN (HCC): ICD-10-CM

## 2023-12-19 DIAGNOSIS — G47.33 OSA (OBSTRUCTIVE SLEEP APNEA): Primary | ICD-10-CM

## 2023-12-19 DIAGNOSIS — E66.9 OBESITY, UNSPECIFIED: Primary | ICD-10-CM

## 2023-12-19 PROCEDURE — RECHECK

## 2023-12-19 NOTE — PROGRESS NOTES
Spoke to patient on the phone:    Patient is interested in the bariatric surgical process. Patient qualifies for surgery with current comorbidities and BMI. Discussed the entire surgical workup process and all requirements of [facility] program and patient's insurance. Answered all questions at the time of the phone call. All SW/RD questions redirected to the next appointment, the 2-hour evaluation.      Patient verbalized understanding of the surgical process at [facility] and had no further questions at this time.

## 2023-12-20 ENCOUNTER — TELEPHONE (OUTPATIENT)
Dept: SLEEP CENTER | Facility: CLINIC | Age: 48
End: 2023-12-20

## 2023-12-20 NOTE — TELEPHONE ENCOUNTER
----- Message from Trisha Dumont PA-C sent at 12/19/2023  8:07 AM EST -----  Although the patient slept poorly, sleep study confirms severe obstructive sleep apnea.  I will place an order for a new CPAP machine at his current settings.  Please have him follow-up 31 to 90 days after getting his new CPAP.

## 2023-12-20 NOTE — TELEPHONE ENCOUNTER
Left message for patient to call office to review sleep study results.    Needs to scheduled DME set up and compliance follow up appointments.

## 2023-12-22 ENCOUNTER — TELEPHONE (OUTPATIENT)
Dept: FAMILY MEDICINE CLINIC | Facility: CLINIC | Age: 48
End: 2023-12-22

## 2023-12-22 NOTE — TELEPHONE ENCOUNTER
Hi, my name is Oswald Leon. Date of birth [birth year]. I have some emails from [employer] [facility] December 5th. You guys received paperwork from my dentist. I need pre authorization to get a root canal. The paperwork still hasn't been received by the dentist. I emailed this [employer] back on December 11th asking when it would be filled out and I didn't get a response. I was just trying to follow up with this tooth is like really infected and I need to get it fixed. So I'm not sure why it takes three weeks to fill out a paper, But if somebody could get back to me and let me know what's going on, I'd greatly appreciate it. Or get that back to the dentist as soon as possible, [phone]. Thank you.

## 2023-12-26 NOTE — TELEPHONE ENCOUNTER
Spoke with pt he will go to [facility] and get a copy and bring along to Thursday's appt with Dr Hernandez. Called and faxed for them to re send paperwork 12/13/23

## 2023-12-28 ENCOUNTER — TELEPHONE (OUTPATIENT)
Dept: FAMILY MEDICINE CLINIC | Facility: CLINIC | Age: 48
End: 2023-12-28

## 2023-12-28 ENCOUNTER — OFFICE VISIT (OUTPATIENT)
Dept: FAMILY MEDICINE CLINIC | Facility: CLINIC | Age: 48
End: 2023-12-28
Payer: COMMERCIAL

## 2023-12-28 VITALS
HEART RATE: 87 BPM | SYSTOLIC BLOOD PRESSURE: 114 MMHG | TEMPERATURE: 98.3 F | WEIGHT: 265.4 LBS | BODY MASS INDEX: 39.31 KG/M2 | OXYGEN SATURATION: 97 % | HEIGHT: 69 IN | DIASTOLIC BLOOD PRESSURE: 80 MMHG

## 2023-12-28 DIAGNOSIS — E11.65 TYPE 2 DIABETES MELLITUS WITH HYPERGLYCEMIA, WITHOUT LONG-TERM CURRENT USE OF INSULIN (HCC): Primary | ICD-10-CM

## 2023-12-28 DIAGNOSIS — B35.3 TINEA PEDIS OF RIGHT FOOT: ICD-10-CM

## 2023-12-28 LAB — SL AMB POCT GLUCOSE BLD: 152

## 2023-12-28 PROCEDURE — 99214 OFFICE O/P EST MOD 30 MIN: CPT | Performed by: FAMILY MEDICINE

## 2023-12-28 PROCEDURE — 82948 REAGENT STRIP/BLOOD GLUCOSE: CPT | Performed by: FAMILY MEDICINE

## 2023-12-28 RX ORDER — BLOOD-GLUCOSE METER
KIT MISCELLANEOUS
Qty: 1 KIT | Refills: 0 | Status: SHIPPED | OUTPATIENT
Start: 2023-12-28

## 2023-12-28 RX ORDER — BLOOD SUGAR DIAGNOSTIC
STRIP MISCELLANEOUS
Qty: 100 EACH | Refills: 3 | Status: SHIPPED | OUTPATIENT
Start: 2023-12-28

## 2023-12-28 RX ORDER — LANCETS 33 GAUGE
EACH MISCELLANEOUS
Qty: 100 EACH | Refills: 3 | Status: SHIPPED | OUTPATIENT
Start: 2023-12-28

## 2023-12-28 RX ORDER — KETOCONAZOLE 20 MG/G
CREAM TOPICAL DAILY
Qty: 30 G | Refills: 0 | Status: SHIPPED | OUTPATIENT
Start: 2023-12-28

## 2023-12-28 NOTE — PROGRESS NOTES
ADULT ANNUAL PHYSICAL  [facility]    NAME: Oswald Marvin  AGE: 48 y.o. SEX: male  : 1975     DATE: 2023     Assessment and Plan:     Problem List Items Addressed This Visit    None  Visit Diagnoses       Type 2 diabetes mellitus with hyperglycemia, without long-term current use of insulin (HCC)    -  Primary    Relevant Medications    Blood Glucose Monitoring Suppl (OneTouch Verio Reflect) w/Device KIT    glucose blood (OneTouch Verio) test strip    OneTouch Delica Lancets 33G MISC    Other Relevant Orders    POCT blood glucose    Tinea pedis of right foot        Relevant Medications    ketoconazole (NIZORAL) 2 % cream            Immunizations and preventive care screenings were discussed with patient today. Appropriate education was printed on patient's after visit summary.    {Prostate cancer screening - consider in males between age of 40-75 depending on age, race, and risk factors. There is difference in the guidelines in regards to the optimal age for screening.  USPSTF states to consider periodic screening in males between the age of 50 to 69. This text is informational and does not need to be selected but if you wish, you can insert standard documentation in your progress note by using F2 (Optional):65988}    Counseling:  {Annual Physical; Counselin}         No follow-ups on file.     Chief Complaint:     Chief Complaint   Patient presents with    Follow-up     diabetes      History of Present Illness:     Adult Annual Physical   Patient here for a comprehensive physical exam. The patient reports problems - DM type 2 .      Diet and Physical Activity  Diet/Nutrition:  admits to high intake of  .   Exercise:  going to join a gym .      Depression Screening  PHQ-2/9 Depression Screening           General Health  Sleep: sleeps well.   Hearing: normal - bilateral.  Vision: no vision problems and goes for regular eye exams.    Dental: regular dental visits.           Advanced Care Planning  Do you have an advanced directive? {YES/NO:20200}  Do you have a durable medical power of ? {YES/NO:20200}     Review of Systems:     Review of Systems   Past Medical History:     Past Medical History:   Diagnosis Date    Colon polyp     Diabetes (HCC)     Hyperlipidemia     YURIY on CPAP       Past Surgical History:     Past Surgical History:   Procedure Laterality Date    COLONOSCOPY      GA LAPS ABD PRTM&OMENTUM DX W/WO SPEC BR/WA SPX N/A 08/16/2022    Procedure: LAPAROSCOPY DIAGNOSTIC, LYSIS OF ADHESIONS, REDUCTION OF INTERNAL HERNIA;  Surgeon: Raymond Núñez MD;  Location: [facility];  Service: General    VASECTOMY        Family History:     Family History   Problem Relation Age of Onset    Liver disease Mother     Emphysema Father       Social History:     Social History     Socioeconomic History    Marital status: /Civil Union     Spouse name: Not on file    Number of children: Not on file    Years of education: Not on file    Highest education level: Not on file   Occupational History    Not on file   Tobacco Use    Smoking status: Former     Current packs/day: 3.00     Types: Cigarettes    Smokeless tobacco: Never    Tobacco comments:     8 YRS AGO   Vaping Use    Vaping status: Never Used   Substance and Sexual Activity    Alcohol use: Never    Drug use: Never    Sexual activity: Yes   Other Topics Concern    Not on file   Social History Narrative    Not on file     Social Determinants of Health     Financial Resource Strain: Not on file   Food Insecurity: No Food Insecurity (6/21/2019)    [facility]    Hunger Vital Sign     Worried About Running Out of Food in the Last Year: Never true     Ran Out of Food in the Last Year: Never true   Transportation Needs: Not on file   Physical Activity: Not on file   Stress: Not on file   Social Connections: Not on file   Intimate Partner Violence: Not on file   Housing  "Stability: Not on file      Current Medications:     Current Outpatient Medications   Medication Sig Dispense Refill    atorvastatin (LIPITOR) 20 mg tablet Take 1 tablet (20 mg total) by mouth daily 90 tablet 1    Blood Glucose Monitoring Suppl (OneTouch Verio Reflect) w/Device KIT Check blood sugars once daily. Please substitute with appropriate alternative as covered by patient's insurance. Dx: E11.65 1 kit 0    Empagliflozin (Jardiance) 10 MG TABS tablet Take 1 tablet (10 mg total) by mouth daily 90 tablet 1    glucose blood (OneTouch Verio) test strip Check blood sugars once daily. Please substitute with appropriate alternative as covered by patient's insurance. Dx: E11.65 100 each 3    ketoconazole (NIZORAL) 2 % cream Apply topically daily 30 g 0    metFORMIN (GLUCOPHAGE) 1000 MG tablet Take 1,000 mg by mouth 2 (two) times a day      omeprazole (PriLOSEC) 40 MG capsule Take 1 capsule (40 mg total) by mouth daily before breakfast 90 capsule 3    OneTouch Delica Lancets 33G MISC Check blood sugars once daily. Please substitute with appropriate alternative as covered by patient's insurance. Dx: E11.65 100 each 3    venlafaxine (EFFEXOR-XR) 75 mg 24 hr capsule TAKE 1 CAPSULE BY MOUTH DAILY WITH BREAKFAST. 30 capsule 5     No current facility-administered medications for this visit.      Allergies:     No Known Allergies   Physical Exam:     /80 (BP Location: Left arm, Patient Position: Sitting, Cuff Size: Large)   Pulse 87   Temp 98.3 °F (36.8 °C) (Temporal)   Ht 5' 9.2\" (1.758 m)   Wt 120 kg (265 lb 6.4 oz)   SpO2 97%   BMI 38.97 kg/m²     Physical Exam     Anastacia Hernandez,   [facility]    "

## 2023-12-28 NOTE — ASSESSMENT & PLAN NOTE
Advised home BS monitoring given hyperglycemia to help understand his diet and effect on BS; will add Mounjaro to try and help with weight loss and BS control; reviewed medication and potential SE; will hold on Jardiance for now but may need additionally if BS remain high;  today; advised need for BS control and potential complications if elevated with things like dental work and risk for infection and poor healing; recheck in March  Lab Results   Component Value Date    HGBA1C 8.2 (A) 12/08/2023

## 2023-12-28 NOTE — ASSESSMENT & PLAN NOTE
Discussed significant diet modifications needed; first focusing on eliminating drinks high in sugar; focusing on increase in lean protein and fiber; discussed risks and benefits of add Mounjaro; denies hx of pancreatitis, MEN2, medullary thyroid cancer, or gastroparesis; reviewed medication and potential SE; recheck in 3 months

## 2023-12-28 NOTE — PROGRESS NOTES
Assessment/Plan:     1. Type 2 diabetes mellitus with hyperglycemia, without long-term current use of insulin ([facility])  Assessment & Plan:  Advised home BS monitoring given hyperglycemia to help understand his diet and effect on BS; will add Mounjaro to try and help with weight loss and BS control; reviewed medication and potential SE; will hold on Jardiance for now but may need additionally if BS remain high;  today; advised need for BS control and potential complications if elevated with things like dental work and risk for infection and poor healing; recheck in March  Lab Results   Component Value Date    HGBA1C 8.2 (A) 12/08/2023       Orders:  -     Blood Glucose Monitoring Suppl (OneTouch Verio Reflect) w/Device KIT; Check blood sugars once daily. Please substitute with appropriate alternative as covered by patient's insurance. Dx: E11.65  -     glucose blood (OneTouch Verio) test strip; Check blood sugars once daily. Please substitute with appropriate alternative as covered by patient's insurance. Dx: E11.65  -     OneTouch Delica Lancets 33G MISC; Check blood sugars once daily. Please substitute with appropriate alternative as covered by patient's insurance. Dx: E11.65  -     POCT blood glucose  -     tirzepatide (Mounjaro) 2.5 MG/0.5ML; Inject 0.5 mL (2.5 mg total) under the skin every 7 days  -     tirzepatide (Mounjaro) 5 MG/0.5ML; Inject 0.5 mL (5 mg total) under the skin every 7 days Do not start before January 27, 2024.  -     Albumin / creatinine urine ratio; Future; Expected date: 03/28/2024  -     Comprehensive metabolic panel; Future; Expected date: 03/28/2024  -     Hemoglobin A1C; Future; Expected date: 03/28/2024    2. Tinea pedis of right foot  Assessment & Plan:  Advised topical antifungal x 2 weeks; f/u guidance given    Orders:  -     ketoconazole (NIZORAL) 2 % cream; Apply topically daily    3. BMI 38.0-38.9,adult  Assessment & Plan:  Discussed significant diet modifications needed;  "first focusing on eliminating drinks high in sugar; focusing on increase in lean protein and fiber; discussed risks and benefits of add Mounjaro; denies hx of pancreatitis, MEN2, medullary thyroid cancer, or gastroparesis; reviewed medication and potential SE; recheck in 3 months            Subjective:      Patient ID: Oswald Marvin is a 48 y.o. male.      DM: Patient has been working on his diet since last visit. Feels better and thinks BS might be under better control. States he was feeling off and a little dizzy at times and that has gone away. Cut out lemonade that he was drinking often. Admits he did have some soda this am. Plans on joining the gym. Does not have way to check BS at home currently.   He is complaining of his right foot itching between the toes. States it seems like there is a rash there as well. Scaling at times.     Patient is interested in weight loss surgery. Did not tolerate semaglutide in past due to GI side effects. He feels it would help him best to get his weight under control as well as his chronic conditions.         The following portions of the patient's history were reviewed and updated as appropriate: allergies, current medications, past family history, past medical history, past social history, past surgical history, and problem list.    Review of Systems   Respiratory:  Negative for shortness of breath.    Cardiovascular:  Negative for chest pain.   Gastrointestinal:  Negative for abdominal pain and nausea.   Neurological:  Negative for dizziness and light-headedness.         Objective:      /80 (BP Location: Left arm, Patient Position: Sitting, Cuff Size: Large)   Pulse 87   Temp 98.3 °F (36.8 °C) (Temporal)   Ht 5' 9.2\" (1.758 m)   Wt 120 kg (265 lb 6.4 oz)   SpO2 97%   BMI 38.97 kg/m²          Physical Exam  Vitals reviewed.   Constitutional:       General: He is not in acute distress.     Appearance: Normal appearance. He is obese. He is not ill-appearing, " toxic-appearing or diaphoretic.   HENT:      Head: Normocephalic and atraumatic.   Eyes:      General: No scleral icterus.        Right eye: No discharge.         Left eye: No discharge.      Conjunctiva/sclera: Conjunctivae normal.   Cardiovascular:      Rate and Rhythm: Normal rate and regular rhythm.      Pulses: Normal pulses.      Heart sounds: Normal heart sounds. No murmur heard.     No gallop.   Pulmonary:      Effort: Pulmonary effort is normal. No respiratory distress.      Breath sounds: Normal breath sounds. No stridor. No wheezing, rhonchi or rales.   Musculoskeletal:      Right lower leg: No edema.      Left lower leg: No edema.   Skin:         Neurological:      General: No focal deficit present.      Mental Status: He is alert and oriented to person, place, and time.   Psychiatric:         Mood and Affect: Mood normal.         Behavior: Behavior normal.         Thought Content: Thought content normal.         Judgment: Judgment normal.

## 2024-01-03 RX ORDER — DULAGLUTIDE 0.75 MG/.5ML
0.75 INJECTION, SOLUTION SUBCUTANEOUS
Qty: 6 ML | Refills: 1 | Status: SHIPPED | OUTPATIENT
Start: 2024-01-03 | End: 2024-07-01

## 2024-01-03 NOTE — TELEPHONE ENCOUNTER
Please notify patient Mounjaro was denied by his insurance. I did send Trulicity to the pharmacy to try which is also a once weekly injection and has similar side effects. It may help with weight loss as well with appropriate diet and exercise.

## 2024-01-15 ENCOUNTER — TELEPHONE (OUTPATIENT)
Dept: SLEEP CENTER | Facility: CLINIC | Age: 49
End: 2024-01-15

## 2024-01-15 LAB
DME PARACHUTE DELIVERY DATE REQUESTED: NORMAL
DME PARACHUTE DELIVERY NOTE: NORMAL
DME PARACHUTE ITEM DESCRIPTION: NORMAL
DME PARACHUTE ORDER STATUS: NORMAL
DME PARACHUTE SUPPLIER NAME: NORMAL
DME PARACHUTE SUPPLIER PHONE: NORMAL

## 2024-01-15 NOTE — TELEPHONE ENCOUNTER
1/25/24 Roman set up    Rx and clinicals for DME setup sent to [facility] via [facility].

## 2024-01-17 ENCOUNTER — TELEPHONE (OUTPATIENT)
Dept: BARIATRICS | Facility: CLINIC | Age: 49
End: 2024-01-17

## 2024-01-17 NOTE — TELEPHONE ENCOUNTER
I called left a message that he cancelled his 2 hour eval with RD/SW asked him to call if still interested in bariatric surgery. If I do not hear back from him by 1/19 I will assume he is no longer interested and close his case for surgery. Explained halting process.

## 2024-01-25 ENCOUNTER — TELEPHONE (OUTPATIENT)
Dept: SLEEP CENTER | Facility: CLINIC | Age: 49
End: 2024-01-25

## 2024-01-25 LAB
DME PARACHUTE DELIVERY DATE ACTUAL: NORMAL
DME PARACHUTE DELIVERY DATE EXPECTED: NORMAL
DME PARACHUTE DELIVERY DATE REQUESTED: NORMAL
DME PARACHUTE DELIVERY NOTE: NORMAL
DME PARACHUTE ITEM DESCRIPTION: NORMAL
DME PARACHUTE ORDER STATUS: NORMAL
DME PARACHUTE SUPPLIER NAME: NORMAL
DME PARACHUTE SUPPLIER PHONE: NORMAL

## 2024-01-25 NOTE — TELEPHONE ENCOUNTER
Pt was set up by Cee in [place] office with an S11 aurtocpap set at 9-20cm and gave an airfit F20 medium mask.

## 2024-01-30 DIAGNOSIS — E11.65 TYPE 2 DIABETES MELLITUS WITH HYPERGLYCEMIA, WITHOUT LONG-TERM CURRENT USE OF INSULIN (HCC): Primary | ICD-10-CM

## 2024-02-21 NOTE — PROGRESS NOTES
Bariatric Behavioral Health Evaluation  Type of surgery  sleeve  months required:6 month requirement   Surgery Date: Aug-Sept  Deadline: Dec  Surgeon: Dr. Eligio Gilbert      Presenting Problem:  Oswald Marvin  is a 48 y.o.   male   :  1975   Presents for a bariatric evaluation for surgery. Patient has history weight loss attempts and has struggled with success.     Is the patient seeking Bariatric Surgery Eval?  yes  The pt shared that he has decided to have bariatric surgery because he has difficulty not over eating. The pt explained in the past he has tried MWM, and on his own but reports that he had trouble maintaining it. The pt explained that he would like to lose weight and be able to maintain it, but feels that he needs surgery to help him because on his own he has not been successful.    The pt explained his mother had surgery.     Research?yes     Realizes Post- Op Requirements? Yes but will learn more through the program.     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      Physical Activity: at work only as his job is physical      Family hx of obesity: mother and grandmother  The pt reports he grew up with his mother and half sister. Who he reported that he did not interact with until he was an adult  Traditions-,  rules/routines around food continued to adulthood: junk food    Current unhealthy eating habits: take out a lot for work, fast eater, reports he does not chew food well, Hydration-a gallon of water a day, un-sweet ice tea in between. Grazer    Mindless eating:yes  Stress eating: no  Emotional eating:no     Mental Health, Trauma and Substance use Assessment    Psychiatric/Psychological Treatment Diagnosis: Pt is in agreement with Dx in Epic chart of  Anxiety, ADD, MDD mild   Outpatient Counselor no  Patient educated on the benefits of outpatient therapy to develop positive coping skills and habits for success long term, resource list provided.   Psychiatrist no  Have you had any Mental Health in-pt admissions? 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 Yes     Tobacco/Vaping History: no     Domestic Violence: No       Abuse or Trauma History: no    Additional comments/stressors related to family/relationships/peer support: Patient identifies wife  as his  support. Patient identifies finances  as current stressors.    Risk Assessment    Supports: reported to be intact  Risk of harm to self or others: (SI/HI) none noted during evaluation  No HI/SI        Presence of Audio/Visual Hallucinations: no    Access to weapons: not reported during interview.    Observation: This interview only (SW and RD will continue working with the patient throughout the program).     Based on the previous information, the client presents the following risk of harm to self or others: low      Physical/Mental Health Status:     Appearance: appropriate  Sociability: average  Affect: appropriate  Mood: calm  Thought Process: coherent  Speech: normal  Content: no impairment  Orientation: person  Yes , place  Yes , and time  Yes   Insight: emotional  good    BARIATRIC SURGERY EDUCATION CHECKLIST    Patient has received the following education related to the bariatric surgery process and understands:    Patients may be required to complete a psychiatric evaluation and receive clearance for surgery from mental health provider.    Patients who undergo weight loss surgery are at higher risk of increased mental health concerns and suicide attempts.    Patients may be required to complete a full substance  abuse evaluation and then complete all treatment recommendations prior to surgery.    If diagnosis of abuse/dependence results, patient may be required to remain sober for one (1) year before having bariatric surgery.    Patients on psychiatric medications should check with their provider to discuss psychiatric medications and the changes in absorption.  Patient should discuss all time release medications with provider and take all medications as prescribed.    The recommendation is that there is no use of any tobacco products, Hookah or vape's for the bariatric post-operation patient.    Bariatric surgery patients should not consume alcohol as a post-operative patient as it may increase risk of numerous health conditions including but not limited to alcohol abuse and ulcers.    There is a possibility of weight regain if patient does not follow all program guidelines and recommendations.    Bariatric surgery patients should exercise thirty (30) to sixty (60) minutes per day to maintain post-surgical weight loss.    Research indicates that bariatric patients are more successful when they see a therapist for up to two (2) years post-op.    Patients will follow all medical and dietary recommendations provided.    Patient will keep all scheduled appointments and follow up with their physician for a minimum of five (5) years.    Patient will take all vitamins as recommended.  Post-operative vitamins are life-long.    There is a goal month set.  All requirements should be met by this time. Don't wait to get started!    There is a deadline month set.  All requirements must be finished by this time and if not, the patient will be halted in the surgery process. The patient can be referred to the medical weight management program or can come back to the surgical program once the unfinished tasks from the previous program are completed.      Female patients of childbearing years are informed that pregnancy is not recommended  until 12 months post-op.      Recommendations: Recommended for surgery  yes         Note :     Patient presented for behavioral health evaluation for the bariatric program. Positive for Mental Health Dx of Anxiety, ADD and  MDD mild . No hx of  therapy.   No hx of  Psychiatry.   No  Drug and/or Alcohol abuse or treatment.   Tobacco/Vaping History:   Patient agrees to remain nicotine free post-surgery.  Patient educated regarding the impact of nicotine and alcohol on the post-surgery bariatric patient. Patient has no  family history of tobacco and alcohol addiction.   Patient will begin making changes with relationship with food through behavior modifications and mindful techniques.  Tracking food intake for one week every three months can assist with weight maintenance and self-accountability for post-surgery success.   and Dietitian follow up visits are available for pre and post-surgery support.  Bariatric support group is available monthly.   Patient verbalized the ability to start making changes and create a healthy relationship around nutrition.  Patient meets criteria for surgery at this time and is referred to the bariatric surgeon.  There are no significant psychological problems identified at this time that would limit the ability of the patient to understand the procedure and comply with any medical and/or surgical recommendations.  Patient does not report having any  psychological co-morbidities that may contribute to the patient's history and inability to lose weight nor is the patient diagnosed with an eating disorder. Patient displays willingness to comply with the preoperative and postoperative treatment plans.         Goal: start to look at the paperwork given to him   Next Appointment:  3/24/24 Dr. Eligio Gilbert  : Susi BEAL

## 2024-02-22 PROBLEM — E66.9 OBESITY, CLASS II, BMI 35-39.9: Status: ACTIVE | Noted: 2024-02-22

## 2024-02-22 PROBLEM — E66.812 OBESITY, CLASS II, BMI 35-39.9: Status: ACTIVE | Noted: 2024-02-22

## 2024-02-23 ENCOUNTER — CLINICAL SUPPORT (OUTPATIENT)
Dept: BARIATRICS | Facility: CLINIC | Age: 49
End: 2024-02-23

## 2024-02-23 ENCOUNTER — APPOINTMENT (OUTPATIENT)
Dept: LAB | Facility: CLINIC | Age: 49
End: 2024-02-23
Payer: COMMERCIAL

## 2024-02-23 VITALS — HEIGHT: 69 IN | WEIGHT: 256.7 LBS | BODY MASS INDEX: 38.02 KG/M2

## 2024-02-23 DIAGNOSIS — E66.9 OBESITY, CLASS II, BMI 35-39.9: ICD-10-CM

## 2024-02-23 DIAGNOSIS — G47.33 OSA (OBSTRUCTIVE SLEEP APNEA): ICD-10-CM

## 2024-02-23 DIAGNOSIS — E11.65 TYPE 2 DIABETES MELLITUS WITH HYPERGLYCEMIA, WITHOUT LONG-TERM CURRENT USE OF INSULIN (HCC): ICD-10-CM

## 2024-02-23 DIAGNOSIS — Z11.3 SCREENING FOR STDS (SEXUALLY TRANSMITTED DISEASES): ICD-10-CM

## 2024-02-23 DIAGNOSIS — E66.9 OBESITY, CLASS II, BMI 35-39.9: Primary | ICD-10-CM

## 2024-02-23 DIAGNOSIS — E11.9 TYPE 2 DIABETES MELLITUS WITHOUT COMPLICATION, WITHOUT LONG-TERM CURRENT USE OF INSULIN (HCC): ICD-10-CM

## 2024-02-23 DIAGNOSIS — E78.5 HYPERLIPIDEMIA, UNSPECIFIED HYPERLIPIDEMIA TYPE: ICD-10-CM

## 2024-02-23 LAB
ALBUMIN SERPL BCP-MCNC: 4.5 G/DL (ref 3.5–5)
ALP SERPL-CCNC: 95 U/L (ref 34–104)
ALT SERPL W P-5'-P-CCNC: 109 U/L (ref 7–52)
ANION GAP SERPL CALCULATED.3IONS-SCNC: 10 MMOL/L
AST SERPL W P-5'-P-CCNC: 53 U/L (ref 13–39)
BASOPHILS # BLD AUTO: 0.03 THOUSANDS/ÂΜL (ref 0–0.1)
BASOPHILS NFR BLD AUTO: 1 % (ref 0–1)
BILIRUB SERPL-MCNC: 0.59 MG/DL (ref 0.2–1)
BUN SERPL-MCNC: 13 MG/DL (ref 5–25)
CALCIUM SERPL-MCNC: 9.7 MG/DL (ref 8.4–10.2)
CHLORIDE SERPL-SCNC: 102 MMOL/L (ref 96–108)
CO2 SERPL-SCNC: 28 MMOL/L (ref 21–32)
CREAT SERPL-MCNC: 0.94 MG/DL (ref 0.6–1.3)
EOSINOPHIL # BLD AUTO: 0.91 THOUSAND/ÂΜL (ref 0–0.61)
EOSINOPHIL NFR BLD AUTO: 15 % (ref 0–6)
ERYTHROCYTE [DISTWIDTH] IN BLOOD BY AUTOMATED COUNT: 13.1 % (ref 11.6–15.1)
EST. AVERAGE GLUCOSE BLD GHB EST-MCNC: 140 MG/DL
GFR SERPL CREATININE-BSD FRML MDRD: 96 ML/MIN/1.73SQ M
GLUCOSE P FAST SERPL-MCNC: 125 MG/DL (ref 65–99)
HBA1C MFR BLD: 6.5 %
HBV CORE AB SER QL: NORMAL
HBV CORE IGM SER QL: NORMAL
HBV SURFACE AG SER QL: NORMAL
HCT VFR BLD AUTO: 46.3 % (ref 36.5–49.3)
HCV AB SER QL: NORMAL
HGB BLD-MCNC: 15.8 G/DL (ref 12–17)
HIV 1+2 AB+HIV1 P24 AG SERPL QL IA: NORMAL
HIV 2 AB SERPL QL IA: NORMAL
HIV1 AB SERPL QL IA: NORMAL
HIV1 P24 AG SERPL QL IA: NORMAL
IMM GRANULOCYTES # BLD AUTO: 0.01 THOUSAND/UL (ref 0–0.2)
IMM GRANULOCYTES NFR BLD AUTO: 0 % (ref 0–2)
LYMPHOCYTES # BLD AUTO: 1.96 THOUSANDS/ÂΜL (ref 0.6–4.47)
LYMPHOCYTES NFR BLD AUTO: 32 % (ref 14–44)
MCH RBC QN AUTO: 28.6 PG (ref 26.8–34.3)
MCHC RBC AUTO-ENTMCNC: 34.1 G/DL (ref 31.4–37.4)
MCV RBC AUTO: 84 FL (ref 82–98)
MONOCYTES # BLD AUTO: 0.5 THOUSAND/ÂΜL (ref 0.17–1.22)
MONOCYTES NFR BLD AUTO: 8 % (ref 4–12)
NEUTROPHILS # BLD AUTO: 2.66 THOUSANDS/ÂΜL (ref 1.85–7.62)
NEUTS SEG NFR BLD AUTO: 44 % (ref 43–75)
NRBC BLD AUTO-RTO: 0 /100 WBCS
PLATELET # BLD AUTO: 341 THOUSANDS/UL (ref 149–390)
PMV BLD AUTO: 10.2 FL (ref 8.9–12.7)
POTASSIUM SERPL-SCNC: 4.4 MMOL/L (ref 3.5–5.3)
PROT SERPL-MCNC: 7.5 G/DL (ref 6.4–8.4)
RBC # BLD AUTO: 5.52 MILLION/UL (ref 3.88–5.62)
SODIUM SERPL-SCNC: 140 MMOL/L (ref 135–147)
TREPONEMA PALLIDUM IGG+IGM AB [PRESENCE] IN SERUM OR PLASMA BY IMMUNOASSAY: NORMAL
WBC # BLD AUTO: 6.07 THOUSAND/UL (ref 4.31–10.16)

## 2024-02-23 PROCEDURE — 86705 HEP B CORE ANTIBODY IGM: CPT

## 2024-02-23 PROCEDURE — 87389 HIV-1 AG W/HIV-1&-2 AB AG IA: CPT

## 2024-02-23 PROCEDURE — 87591 N.GONORRHOEAE DNA AMP PROB: CPT

## 2024-02-23 PROCEDURE — RECHECK

## 2024-02-23 PROCEDURE — 87491 CHLMYD TRACH DNA AMP PROBE: CPT

## 2024-02-23 PROCEDURE — 86803 HEPATITIS C AB TEST: CPT

## 2024-02-23 PROCEDURE — RECHECK: Performed by: DIETITIAN, REGISTERED

## 2024-02-23 PROCEDURE — 85025 COMPLETE CBC W/AUTO DIFF WBC: CPT

## 2024-02-23 PROCEDURE — 36415 COLL VENOUS BLD VENIPUNCTURE: CPT

## 2024-02-23 PROCEDURE — 80053 COMPREHEN METABOLIC PANEL: CPT

## 2024-02-23 PROCEDURE — 86780 TREPONEMA PALLIDUM: CPT

## 2024-02-23 PROCEDURE — 83036 HEMOGLOBIN GLYCOSYLATED A1C: CPT

## 2024-02-23 PROCEDURE — 87340 HEPATITIS B SURFACE AG IA: CPT

## 2024-02-23 PROCEDURE — 86704 HEP B CORE ANTIBODY TOTAL: CPT

## 2024-02-25 LAB
C TRACH DNA SPEC QL NAA+PROBE: NEGATIVE
N GONORRHOEA DNA SPEC QL NAA+PROBE: NEGATIVE

## 2024-03-08 ENCOUNTER — OFFICE VISIT (OUTPATIENT)
Dept: FAMILY MEDICINE CLINIC | Facility: CLINIC | Age: 49
End: 2024-03-08
Payer: COMMERCIAL

## 2024-03-08 VITALS
TEMPERATURE: 98.3 F | HEART RATE: 82 BPM | BODY MASS INDEX: 37.37 KG/M2 | WEIGHT: 261 LBS | DIASTOLIC BLOOD PRESSURE: 96 MMHG | SYSTOLIC BLOOD PRESSURE: 126 MMHG | HEIGHT: 70 IN | OXYGEN SATURATION: 95 %

## 2024-03-08 DIAGNOSIS — E11.9 TYPE 2 DIABETES MELLITUS WITHOUT COMPLICATION, WITHOUT LONG-TERM CURRENT USE OF INSULIN (HCC): Primary | ICD-10-CM

## 2024-03-08 DIAGNOSIS — R74.8 ELEVATED LIVER ENZYMES: ICD-10-CM

## 2024-03-08 DIAGNOSIS — E11.65 TYPE 2 DIABETES MELLITUS WITH HYPERGLYCEMIA, WITHOUT LONG-TERM CURRENT USE OF INSULIN (HCC): ICD-10-CM

## 2024-03-08 LAB
CREAT UR-MCNC: 166.5 MG/DL
LEFT EYE DIABETIC RETINOPATHY: NORMAL
LEFT EYE IMAGE QUALITY: NORMAL
LEFT EYE MACULAR EDEMA: NORMAL
LEFT EYE OTHER RETINOPATHY: NORMAL
MICROALBUMIN UR-MCNC: 19.3 MG/L
MICROALBUMIN/CREAT 24H UR: 12 MG/G CREATININE (ref 0–30)
RIGHT EYE DIABETIC RETINOPATHY: NORMAL
RIGHT EYE IMAGE QUALITY: NORMAL
RIGHT EYE MACULAR EDEMA: NORMAL
RIGHT EYE OTHER RETINOPATHY: NORMAL
SEVERITY (EYE EXAM): NORMAL

## 2024-03-08 PROCEDURE — 82043 UR ALBUMIN QUANTITATIVE: CPT | Performed by: FAMILY MEDICINE

## 2024-03-08 PROCEDURE — 99214 OFFICE O/P EST MOD 30 MIN: CPT | Performed by: FAMILY MEDICINE

## 2024-03-08 PROCEDURE — 92250 FUNDUS PHOTOGRAPHY W/I&R: CPT | Performed by: FAMILY MEDICINE

## 2024-03-08 PROCEDURE — 82570 ASSAY OF URINE CREATININE: CPT | Performed by: FAMILY MEDICINE

## 2024-03-08 NOTE — ASSESSMENT & PLAN NOTE
Suspect possible fatty liver given diet hx; check u/s and advised on low fat diet; does not use alcohol; recheck labs in 3 months

## 2024-03-08 NOTE — ASSESSMENT & PLAN NOTE
DM significant improved from last check with cutting back on sugar and adding trulicity; reviewed further diet modifications; repeat in 3 months; updated eye exam and foot exam today  Lab Results   Component Value Date    HGBA1C 6.5 (H) 02/23/2024

## 2024-03-08 NOTE — PROGRESS NOTES
Assessment/Plan:     1. Type 2 diabetes mellitus without complication, without long-term current use of insulin (HCC)  Assessment & Plan:  DM significant improved from last check with cutting back on sugar and adding trulicity; reviewed further diet modifications; repeat in 3 months; updated eye exam and foot exam today  Lab Results   Component Value Date    HGBA1C 6.5 (H) 02/23/2024       Orders:  -     Albumin / creatinine urine ratio  -     IRIS Diabetic eye exam  -     dulaglutide (Trulicity) 1.5 MG/0.5ML injection; Inject 0.5 mL (1.5 mg total) under the skin every 7 days    2. BMI 37.0-37.9, adult  Assessment & Plan:  Increase trulicity; advised on diet modifications; f/u with weight management    Orders:  -     US right upper quadrant; Future; Expected date: 03/08/2024    3. Elevated liver enzymes  Assessment & Plan:  Suspect possible fatty liver given diet hx; check u/s and advised on low fat diet; does not use alcohol; recheck labs in 3 months    Orders:  -     US right upper quadrant; Future; Expected date: 03/08/2024  -     Comprehensive metabolic panel; Future; Expected date: 04/08/2024    4. Type 2 diabetes mellitus with hyperglycemia, without long-term current use of insulin (HCC)  Assessment & Plan:  DM significant improved from last check with cutting back on sugar and adding trulicity; reviewed further diet modifications; repeat in 3 months; updated eye exam and foot exam today  Lab Results   Component Value Date    HGBA1C 6.5 (H) 02/23/2024       Orders:  -     Hemoglobin A1C With EAG; Future          Subjective:      Patient ID: Oswald Marvin is a 48 y.o. male.    DM: Patient states he cut out sweetened beverages and a lot of sugar from his diet. Admits he is still eating a lot of fast food when working, almost daily. Plans on going to weight management. Has difficulty managing his appetite. Using trulicity feels would benefit from higher dosage. Denies any SE.       Lab Results       Component                 Value               Date                       HGBA1C                   6.5 (H)             02/23/2024            Lab Results       Component                Value               Date                       SODIUM                   140                 02/23/2024                 K                        4.4                 02/23/2024                 CL                       102                 02/23/2024                 CO2                      28                  02/23/2024                 AGAP                     10                  02/23/2024                 BUN                      13                  02/23/2024                 CREATININE               0.94                02/23/2024                 GLUC                     152                 12/28/2023                 GLUF                     125 (H)             02/23/2024                 CALCIUM                  9.7                 02/23/2024                 AST                      53 (H)              02/23/2024                 ALT                      109 (H)             02/23/2024                 ALKPHOS                  95                  02/23/2024                 TP                       7.5                 02/23/2024                 TBILI                    0.59                02/23/2024                 EGFR                     96                  02/23/2024              Lab Results       Component                Value               Date                       CHOLESTEROL              160                 03/11/2023                 CHOLESTEROL              207 (H)             09/29/2022                 CHOLESTEROL              193                 05/17/2022            Lab Results       Component                Value               Date                       HDL                      33 (L)              03/11/2023                 HDL                      37 (L)              09/29/2022                 HDL                      43                   "05/17/2022            Lab Results       Component                Value               Date                       TRIG                     89                  03/11/2023                 TRIG                     128                 09/29/2022                 TRIG                     82                  05/17/2022            Lab Results       Component                Value               Date                       NONHDLC                  170                 09/29/2022                 NONHDLC                  150                 05/17/2022                      The following portions of the patient's history were reviewed and updated as appropriate: allergies, current medications, past family history, past medical history, past social history, past surgical history, and problem list.    Review of Systems   Constitutional:  Negative for chills and fever.   Respiratory:  Negative for shortness of breath.    Cardiovascular:  Negative for chest pain.   Gastrointestinal:  Negative for abdominal pain, nausea and vomiting.         Objective:      /96 (BP Location: Right arm, Patient Position: Sitting, Cuff Size: Adult)   Pulse 82   Temp 98.3 °F (36.8 °C)   Ht 5' 10\" (1.778 m)   Wt 118 kg (261 lb)   SpO2 95%   BMI 37.45 kg/m²          Physical Exam  Vitals reviewed.   Constitutional:       General: He is not in acute distress.     Appearance: Normal appearance. He is not ill-appearing, toxic-appearing or diaphoretic.   HENT:      Head: Normocephalic and atraumatic.   Eyes:      General: No scleral icterus.        Right eye: No discharge.         Left eye: No discharge.      Conjunctiva/sclera: Conjunctivae normal.   Cardiovascular:      Rate and Rhythm: Normal rate and regular rhythm.      Pulses: Normal pulses. no weak pulses.           Dorsalis pedis pulses are 2+ on the right side and 2+ on the left side.        Posterior tibial pulses are 2+ on the right side and 2+ on the left side.      Heart sounds: Normal " heart sounds. No murmur heard.     No gallop.   Pulmonary:      Effort: Pulmonary effort is normal. No respiratory distress.      Breath sounds: Normal breath sounds. No stridor. No wheezing, rhonchi or rales.   Musculoskeletal:      Right lower leg: No edema.      Left lower leg: No edema.   Feet:      Right foot:      Skin integrity: No ulcer, skin breakdown, erythema, warmth, callus or dry skin.      Left foot:      Skin integrity: No ulcer, skin breakdown, erythema, warmth, callus or dry skin.   Neurological:      General: No focal deficit present.      Mental Status: He is alert and oriented to person, place, and time.   Psychiatric:         Mood and Affect: Mood normal.         Behavior: Behavior normal.         Thought Content: Thought content normal.         Judgment: Judgment normal.       Diabetic Foot Exam    Patient's shoes and socks removed.    Right Foot/Ankle   Right Foot Inspection  Skin Exam: skin normal and skin intact. No dry skin, no warmth, no callus, no erythema, no maceration, no abnormal color, no pre-ulcer, no ulcer and no callus.     Sensory   Monofilament testing: intact    Vascular  Capillary refills: < 3 seconds  The right DP pulse is 2+. The right PT pulse is 2+.     Left Foot/Ankle  Left Foot Inspection  Skin Exam: skin normal and skin intact. No dry skin, no warmth, no erythema, no maceration, normal color, no pre-ulcer, no ulcer and no callus.     Sensory   Monofilament testing: intact    Vascular  Capillary refills: < 3 seconds  The left DP pulse is 2+. The left PT pulse is 2+.     Assign Risk Category  No deformity present  No loss of protective sensation  No weak pulses  Risk: 0

## 2024-03-14 ENCOUNTER — HOSPITAL ENCOUNTER (OUTPATIENT)
Dept: ULTRASOUND IMAGING | Facility: HOSPITAL | Age: 49
Discharge: HOME | End: 2024-03-14
Payer: COMMERCIAL

## 2024-03-14 ENCOUNTER — CONSULT (OUTPATIENT)
Dept: BARIATRICS | Facility: CLINIC | Age: 49
End: 2024-03-14
Payer: COMMERCIAL

## 2024-03-14 VITALS
BODY MASS INDEX: 38.21 KG/M2 | TEMPERATURE: 97.6 F | HEART RATE: 76 BPM | DIASTOLIC BLOOD PRESSURE: 84 MMHG | SYSTOLIC BLOOD PRESSURE: 122 MMHG | HEIGHT: 69 IN | WEIGHT: 258 LBS

## 2024-03-14 DIAGNOSIS — E66.01 MORBID (SEVERE) OBESITY DUE TO EXCESS CALORIES (HCC): Primary | ICD-10-CM

## 2024-03-14 DIAGNOSIS — R74.8 ELEVATED LIVER ENZYMES: ICD-10-CM

## 2024-03-14 PROCEDURE — 76705 ECHO EXAM OF ABDOMEN: CPT

## 2024-03-14 PROCEDURE — 99203 OFFICE O/P NEW LOW 30 MIN: CPT | Performed by: SURGERY

## 2024-03-14 NOTE — PROGRESS NOTES
"    BARIATRIC CONSULT-INITIAL - BARIATRIC SURGERY  Oswald Marvin 48 y.o. male MRN: [id number]  Unit/Bed#:  Encounter: [id number]      HPI:  Oswald Marvin is a 48 y.o. male who presents with morbid obesity to discuss weight loss options.    Review of Systems    Historical Information   Past Medical History:   Diagnosis Date    Colon polyp     Diabetes (HCC)     Hyperlipidemia     YURIY on CPAP      Past Surgical History:   Procedure Laterality Date    COLONOSCOPY      UT LAPS ABD PRTM&OMENTUM DX W/WO SPEC BR/WA SPX N/A 08/16/2022    Procedure: LAPAROSCOPY DIAGNOSTIC, LYSIS OF ADHESIONS, REDUCTION OF INTERNAL HERNIA;  Surgeon: Raymond Núñez MD;  Location: [facility];  Service: General    VASECTOMY       Social History   Social History     Substance and Sexual Activity   Alcohol Use Never     Social History     Substance and Sexual Activity   Drug Use Never     Social History     Tobacco Use   Smoking Status Former    Current packs/day: 3.00    Types: Cigarettes    Passive exposure: Never   Smokeless Tobacco Never   Tobacco Comments    8 YRS AGO     Family History: non-contributory    Meds/Allergies   all medications and allergies reviewed  No Known Allergies    Objective       Current Vitals:   Blood Pressure: 122/84 (03/14/24 1634)  Pulse: 76 (03/14/24 1634)  Temperature: 97.6 °F (36.4 °C) (03/14/24 1634)  Temp Source: Tympanic (03/14/24 1634)  Height: 5' 9.2\" (175.8 cm) (03/14/24 1634)  Weight - Scale: 117 kg (258 lb) (03/14/24 1634)  Body mass index is 37.88 kg/m².      Invasive Devices       None                   Physical Exam    Lab Results: I have personally reviewed pertinent lab results.    Imaging: I have personally reviewed pertinent reports.    EKG, Pathology, and Other Studies: I have personally reviewed pertinent reports.      Code Status: [unfilled]  Advance Directive and Living Will:      Power of :    POLST:      Assessment/PLAN:            Patient has a long history of morbid " obesity and is presenting to discuss the surgical weight loss options. Despite the patient best efforts patient was unable to lose any meaningful or sustainable weight using nonsurgical means.We had a long discussion regarding all the surgical weight-loss options at our disposal at this point and reviewed the risks and benefits of each procedure in details as it relates to her age, BMI and medical conditions.    EGD SHOWED 5 CM HH AND FINDINGS SUSPICIOUS OF ADKINS'S BUT BIOPSIES WERE NEGATIVE FOR ADKINS'S    Patient elected to undergo a PEH repair and Sleeve gastrectomy. WE DISCUSSED PEH AND RYGB VS PEH AND SLEEVE GASTRECTOMY INCLUDING RISKS AND BENEFITS PATIENT NOT INTERESTED IN RYGB    Risks and benefits were explained to the patient. We also discussed the importance and need of a preoperative workup to make sure that the patient can undergo the procedure safely. Preoperative workup includes sleep apnea screening, cardiac evaluation, nutrition/psych and preoperative EGD. Risks and benefits of all the preoperative diagnostic tests were discussed with the patient including but not limited to the upper endoscopy.Alternatives to surgery and alternative forms of surgery were also explained. Postsurgical commitment and aftercare programs were discussed and explained to the patient in details .    In terms of comorbidities patient suffers mostly of   Past Medical History:   Diagnosis Date    Colon polyp     Diabetes (HCC)     Hyperlipidemia     YURIY on CPAP        I informed the patient that the rate of resolution of comorbid conditions following weight loss surgery is between 60 and 90% depending on the severity of the specific medical condition.I discussed and educated the patient regarding the different components of our multidisciplinary program and the importance of compliance and follow-up in the postoperative period. All questions answered. Patient understands risks and benefits. An image of the procedure was  also shown to the patient. After showing the image we discussed all the technical aspects of the procedure and also the potential complications including but not limited to gastrointestinal perforation, leak, obstruction, stricture and hemorrhage. I spent 30 min with the patient more than 50% of the time was spent educating the patient and coordinating care.

## 2024-03-29 ENCOUNTER — TELEPHONE (OUTPATIENT)
Age: 49
End: 2024-03-29

## 2024-03-29 NOTE — TELEPHONE ENCOUNTER
Pt calls in and would like to schedule an appt for travel clinic. He states he will be going to [place] in August. He states he has received Hep a and not sure if he will need it. Informed him  will go over his itinerary and discussed what's recommended vs what's required. Pt now scheduled for 5/7

## 2024-04-11 NOTE — PROGRESS NOTES
Behavioral Health Follow Up Note        Name: Oswald Marvin            2/6 weight check  Starting weight 256.7  Last time weight 258  Today's weight 264.4  BMI:    Surgery month:  Aug-Sept  Surgery deadline: Dec  Surgeon:Dr. Eligio Gilbert  Type of Surgery: sleeve    Mental health and wellness -   Patient presents to the office today for a weight check and support. The pt reports his spouse is is support system. Discussed the importance of making lifestyle changes and how the surgery is a tool but if he is not making lifestyle changes he can regain the weight back. The pt stated his mother had the surgery and she did regain the weight back.     Eating behaviors - 2-3 meals a day and reports he over eats until bedtime. Eating fast. Reviewed alternatives to boredom eating. Hydration-water a gallon a day but not consistent. Soda-2-3 cans a day. Coffee 16 oz.     Activity -  No activity outside of work.     Progress toward program requirements    Workflow:  Psych and/or D+A Clearance: n/a  PCP Letter: done  Surgeon Appt.: done  EGD : pending 4/17/24  Cardiac Risk Assessment: pending   Sleep Studies:on CPAP  Bloodwork: pending   Nicotine test:non-smoker  Support Group: recommended     Reviewed and discussed  Patient educated and handouts provided.  Adequate hydration  Exercise  Meal planning and preparation  Lifestyle changes  Possible problems with poor eating habits  Practice mindful eating.    Setting aside time to eat slowly, and savor food    Goal: 1-decrease portions 2-eat slow 3-cut out soda  Next appointment: 5/17/24 RD

## 2024-04-16 ENCOUNTER — CLINICAL SUPPORT (OUTPATIENT)
Dept: BARIATRICS | Facility: CLINIC | Age: 49
End: 2024-04-16

## 2024-04-16 VITALS — WEIGHT: 264.4 LBS | BODY MASS INDEX: 38.82 KG/M2

## 2024-04-16 DIAGNOSIS — E66.9 OBESITY, CLASS II, BMI 35-39.9: Primary | ICD-10-CM

## 2024-04-16 PROCEDURE — RECHECK

## 2024-04-17 ENCOUNTER — TELEPHONE (OUTPATIENT)
Dept: BARIATRICS | Facility: CLINIC | Age: 49
End: 2024-04-17

## 2024-04-17 ENCOUNTER — OFFICE VISIT (OUTPATIENT)
Dept: GASTROENTEROLOGY | Facility: MEDICAL CENTER | Age: 49
End: 2024-04-17
Payer: COMMERCIAL

## 2024-04-17 ENCOUNTER — TELEPHONE (OUTPATIENT)
Age: 49
End: 2024-04-17

## 2024-04-17 VITALS
BODY MASS INDEX: 38.05 KG/M2 | OXYGEN SATURATION: 97 % | DIASTOLIC BLOOD PRESSURE: 75 MMHG | TEMPERATURE: 98 F | HEIGHT: 70 IN | WEIGHT: 265.8 LBS | SYSTOLIC BLOOD PRESSURE: 120 MMHG | HEART RATE: 73 BPM

## 2024-04-17 DIAGNOSIS — K22.70 BARRETT'S ESOPHAGUS WITHOUT DYSPLASIA: ICD-10-CM

## 2024-04-17 DIAGNOSIS — K21.9 GASTROESOPHAGEAL REFLUX DISEASE, UNSPECIFIED WHETHER ESOPHAGITIS PRESENT: Primary | ICD-10-CM

## 2024-04-17 DIAGNOSIS — Z86.010 PERSONAL HISTORY OF COLONIC POLYPS: ICD-10-CM

## 2024-04-17 PROCEDURE — 99214 OFFICE O/P EST MOD 30 MIN: CPT | Performed by: PHYSICIAN ASSISTANT

## 2024-04-17 NOTE — TELEPHONE ENCOUNTER
Called pt to f/u with message via my chart, informed pt after talking to his surgical coordinator, he was able to use his EGD from Oct no need for another one

## 2024-04-17 NOTE — TELEPHONE ENCOUNTER
Patient would like to know if he is required to have another EGD done before surgery or would his EGD performed on 10/5/23 be sufficient enough. Please call the patient back at [phone] to advise.

## 2024-04-17 NOTE — PROGRESS NOTES
[facility] - Outpatient Follow-up Note  Oswald Marvin 48 y.o. male MRN: [id number]  Encounter: [id number]      Assessment and Plan    1. GERD  2. Valdez's esophagus  EGD 5/19/23 revealed a moderate sized hiatal hernia and severe grade D esophagitis of the middle and lower third of the esophagus.  He was started on high-dose PPI and repeat EGD 10/5/23 revealed a 5 cm hiatal hernia, a single small mucosal nodule at the GE junction and Valdez's esophagus confirmed on biopsy.  The patient is doing well on once daily PPI. Recall EGD is recommended in 1 year after the last  -Continue once daily PPI  -Repeat EGD 10/2024     3. Personal history of colon polyps   Colonoscopy 5/19/23 with 1 subcentimeter colon polyp removed and small internal hemorrhoids however prep was inadequate and although the cecum was reached the procedure was quickly terminated thereafter given difficulty with patient's sedation and moving during the procedure. Repeat colonoscopy 10/5/23 done in the hospital after a two day prep revealed 5 <1cm colon polyps removed, most of which were adenomas. Recall recommended 3 years.  -Repeat colonoscopy 10/2026 or sooner if clinically indicated     Follow-up 6 months    ______________________________________________________________________    History of Present Illness  Oswald Marvin is a 48 y.o. male here for follow up evaluation of GERD and colon cancer screening.  The patient underwent initial EGD and colonoscopy 5/2023.  EGD revealed grade D esophagitis and colonoscopy revealed 1 polyp but after the cecum was reached was quickly terminated secondary to difficulty with food dysphagia and inadequate prep.  The patient underwent repeat EGD and colonoscopy 10/2023 that revealed healed esophagitis but did confirm Valdez's esophagus and colonoscopy revealed 5 subcentimeter colon polyps all removed.  The patient is currently on omeprazole 40 mg once daily and doing well.  No  GI symptoms or complaints.       Review of Systems   Constitutional:  Negative for fever.   Gastrointestinal:  Negative for abdominal pain, constipation, diarrhea, nausea and vomiting.   Genitourinary:  Negative for dysuria, frequency and hematuria.   Musculoskeletal:  Negative for arthralgias and myalgias.   Neurological:  Negative for headaches.       Past Medical History  Past Medical History:   Diagnosis Date    Colon polyp     Diabetes (HCC)     Diabetes mellitus (HCC)     GERD (gastroesophageal reflux disease)     Hyperlipidemia     YURIY on CPAP        Past Social history  Past Surgical History:   Procedure Laterality Date    COLONOSCOPY      MD LAPS ABD PRTM&OMENTUM DX W/WO SPEC BR/WA SPX N/A 2022    Procedure: LAPAROSCOPY DIAGNOSTIC, LYSIS OF ADHESIONS, REDUCTION OF INTERNAL HERNIA;  Surgeon: Raymond Núñez MD;  Location: [facility];  Service: General    VASECTOMY       Social History     Socioeconomic History    Marital status: /Civil Union     Spouse name: Not on file    Number of children: Not on file    Years of education: Not on file    Highest education level: Not on file   Occupational History    Not on file   Tobacco Use    Smoking status: Former     Current packs/day: 0.00     Average packs/day: 3.0 packs/day for 15.0 years (45.0 ttl pk-yrs)     Types: Cigarettes     Quit date: 3/16/2012     Years since quittin.0     Passive exposure: Never    Smokeless tobacco: Never    Tobacco comments:     8 YRS AGO   Vaping Use    Vaping status: Never Used   Substance and Sexual Activity    Alcohol use: Never    Drug use: Never    Sexual activity: Yes     Partners: Female   Other Topics Concern    Not on file   Social History Narrative    Not on file     Social Determinants of Health     Financial Resource Strain: Not on file   Food Insecurity: No Food Insecurity (2019)    Received from [facility]    Hunger Vital Sign     Worried About Running Out of Food in the Last Year: Never true      Ran Out of Food in the Last Year: Never true   Transportation Needs: Not on file   Physical Activity: Not on file   Stress: Not on file   Social Connections: Not on file   Intimate Partner Violence: Not on file   Housing Stability: Not on file     Social History     Substance and Sexual Activity   Alcohol Use Never     Social History     Substance and Sexual Activity   Drug Use Never     Social History     Tobacco Use   Smoking Status Former    Current packs/day: 0.00    Average packs/day: 3.0 packs/day for 15.0 years (45.0 ttl pk-yrs)    Types: Cigarettes    Quit date: 3/16/2012    Years since quittin.0    Passive exposure: Never   Smokeless Tobacco Never   Tobacco Comments    8 YRS AGO       Past Family History  Family History   Problem Relation Age of Onset    Liver disease Mother     Emphysema Father        Current Medications  Current Outpatient Medications   Medication Sig Dispense Refill    atorvastatin (LIPITOR) 20 mg tablet Take 1 tablet (20 mg total) by mouth daily 90 tablet 1    Blood Glucose Monitoring Suppl (OneTouch Verio Reflect) w/Device KIT Check blood sugars once daily. Please substitute with appropriate alternative as covered by patient's insurance. Dx: E11.65 1 kit 0    dulaglutide (Trulicity) 1.5 MG/0.5ML injection Inject 0.5 mL (1.5 mg total) under the skin every 7 days 6 mL 1    glucose blood (OneTouch Verio) test strip Check blood sugars once daily. Please substitute with appropriate alternative as covered by patient's insurance. Dx: E11.65 100 each 3    metFORMIN (GLUCOPHAGE) 1000 MG tablet Take 1 tablet (1,000 mg total) by mouth 2 (two) times a day 180 tablet 1    omeprazole (PriLOSEC) 40 MG capsule Take 1 capsule (40 mg total) by mouth daily before breakfast 90 capsule 3    OneTouch Delica Lancets 33G MISC Check blood sugars once daily. Please substitute with appropriate alternative as covered by patient's insurance. Dx: E11.65 100 each 3    venlafaxine (EFFEXOR-XR) 75 mg 24 hr  "capsule TAKE 1 CAPSULE BY MOUTH DAILY WITH BREAKFAST. 30 capsule 5    ketoconazole (NIZORAL) 2 % cream Apply topically daily (Patient not taking: Reported on 4/17/2024) 30 g 0     No current facility-administered medications for this visit.       Allergies  No Known Allergies      The following portions of the patient's history were reviewed and updated as appropriate: allergies, current medications, past medical history, past social history, past surgical history and problem list.      Vitals  Vitals:    04/17/24 0834   Pulse: 69   SpO2: 97%   Weight: 121 kg (265 lb 12.8 oz)   Height: 5' 10\" (1.778 m)         Physical Exam  Constitutional   General appearance: Patient is seated and in no acute distress, well appearing and well nourished.   Head and Face   Head and face: Normal.    Eyes   Conjunctiva and lids: No erythema, swelling or discharge.  Anicteric.  Ears, Nose, Mouth, and Throat   Hearing: Normal.    Neck: Supple, trachea midline.  Pulmonary   Respiratory effort: No increased work of breathing or signs of respiratory distress.    Cardiovascular   Examination of extremities for edema and/or varicosities: Normal.    Musculoskeletal   Gait and station: Normal   Skin   Skin and subcutaneous tissue: Warm, dry, and intact. No visible jaundice, lesions or rashes.  Psychiatric   Judgment and insight: Normal  Recent and remote memory:  Normal  Mood and affect: Normal      Results  No visits with results within 1 Day(s) from this visit.   Latest known visit with results is:   Office Visit on 03/08/2024   Component Date Value    Creatinine, Ur 03/08/2024 166.5     Albumin,U,Random 03/08/2024 19.3     Albumin Creat Ratio 03/08/2024 12     Severity 03/08/2024 NORMAL     Right Eye Diabetic Retin* 03/08/2024 None     Right Eye Macular Edema 03/08/2024 None     Right Eye Other Retinopa* 03/08/2024 None     Right Eye Image Quality 03/08/2024 Gradable Image     Left Eye Diabetic Retino* 03/08/2024 None     Left Eye Macular " Edema 2024 None     Left Eye Other Retinopat* 2024 None     Left Eye Image Quality 2024 Gradable Image     Result 2024 Retinal Study Result for DARINEL HARRIS     Result 2024 DARINEL HARRIS, mary ellen 49 y/o, M (: 1975, MRN: [id number])     Result 2024 presented to [facility] on 2024 for a retinal imaging study of the left and right eyes.     Result 2024 Based on the findings of the study, the following is recommended for DARINEL HARRIS     Result 2024 Normal Study: Re-scan the patient in 12 months or in the next calendar year.     Result 2024 Interpreting Provider's Comments:  No comments provided     Result 2024 Diagnoses Present: There are no ICD-10 codes present for this exam.     Result 2024 Right eye findings: Negative for Diabetic Retinopathy     Result 2024 Negative for Macular Edema     Result 2024 Left eye findings: Negative for Diabetic Retinopathy     Result 2024 Negative for Macular Edema     Result 2024 This result was electronically signed by Álvaro Trevizo MD, NPI: [id number], Taxonomy: [id number] on 2024 16:10 [facility].     Result 2024 NOTE:  Any pathology noted on this diabetic retinal evaluation should be confirmed by an appropriate ophthalmic examination.        Radiology Results  No results found.    Orders  No orders of the defined types were placed in this encounter.      Answers submitted by the patient for this visit:  Abdominal Pain Questionnaire (Submitted on 2024)  Chief Complaint: Abdominal pain  Progression since onset: resolved  anorexia: No  belching: No  flatus: No  hematochezia: No  melena: No  weight loss: No  Aggravated by: nothing  Relieved by: nothing

## 2024-05-07 ENCOUNTER — OFFICE VISIT (OUTPATIENT)
Dept: INFECTIOUS DISEASES | Facility: CLINIC | Age: 49
End: 2024-05-07

## 2024-05-07 VITALS
HEIGHT: 70 IN | OXYGEN SATURATION: 97 % | BODY MASS INDEX: 37.8 KG/M2 | HEART RATE: 76 BPM | RESPIRATION RATE: 16 BRPM | TEMPERATURE: 97.6 F | WEIGHT: 264 LBS | DIASTOLIC BLOOD PRESSURE: 78 MMHG | SYSTOLIC BLOOD PRESSURE: 138 MMHG

## 2024-05-07 DIAGNOSIS — Z71.84 TRAVEL ADVICE ENCOUNTER: Primary | ICD-10-CM

## 2024-05-07 PROCEDURE — 90691 TYPHOID VACCINE IM: CPT

## 2024-05-07 PROCEDURE — 90471 IMMUNIZATION ADMIN: CPT

## 2024-05-07 PROCEDURE — 90717 YELLOW FEVER VACCINE SUBQ: CPT

## 2024-05-07 PROCEDURE — 99401 PREV MED CNSL INDIV APPRX 15: CPT | Performed by: INTERNAL MEDICINE

## 2024-05-07 RX ORDER — ATOVAQUONE AND PROGUANIL HYDROCHLORIDE 250; 100 MG/1; MG/1
1 TABLET, FILM COATED ORAL
Qty: 18 TABLET | Refills: 1 | Status: SHIPPED | OUTPATIENT
Start: 2024-08-15 | End: 2024-08-13

## 2024-05-07 NOTE — PROGRESS NOTES
[facility]    Patient is traveling to countries or areas within countries where immunizations are required or recommended:   [place]    Patient states they will visit underdeveloped areas with poor sanitation Yes/No: Yes   Patient requires malaria prophylaxis Yes/No: Yes    No orders of the defined types were placed in this encounter.  Had Hep A and Tdap  Warned of Yellow Fever Vaccine side effects including death and accepts risks    Patient instructed how to take medications Yes/No: Yes  Patient warned about side effects Yes/No: Yes  Patient declined Yes/No: No

## 2024-05-09 DIAGNOSIS — F41.9 ANXIETY: ICD-10-CM

## 2024-05-09 DIAGNOSIS — K20.90 ESOPHAGITIS DETERMINED BY ENDOSCOPY: ICD-10-CM

## 2024-05-09 RX ORDER — OMEPRAZOLE 40 MG/1
40 CAPSULE, DELAYED RELEASE ORAL
Qty: 90 CAPSULE | Refills: 1 | Status: SHIPPED | OUTPATIENT
Start: 2024-05-09

## 2024-05-09 RX ORDER — VENLAFAXINE HYDROCHLORIDE 75 MG/1
CAPSULE, EXTENDED RELEASE ORAL
Qty: 30 CAPSULE | Refills: 5 | Status: SHIPPED | OUTPATIENT
Start: 2024-05-09

## 2024-05-10 ENCOUNTER — TELEPHONE (OUTPATIENT)
Age: 49
End: 2024-05-10

## 2024-05-10 NOTE — TELEPHONE ENCOUNTER
Forwarding to PA team for review.   Patient needs prior authorization for Prilosec 40 mg   Dr. Jaiyeola/Sravan Green

## 2024-05-11 NOTE — TELEPHONE ENCOUNTER
PA for omeprazole    Submitted via    []CMM-KEY   []Surescripts-Case ID #   []Faxed to plan   [x]Other website Prompt ASHISH HINOJOSA ID [id number]  []Phone call Case ID #     Office notes sent, clinical questions answered. Awaiting determination    Turnaround time for your insurance to make a decision on your Prior Authorization can take 7-21 business days.

## 2024-05-15 NOTE — PROGRESS NOTES
"Bariatric Nutrition Assessment Note    Type of surgery    Preop 6 months required: 3 of 6 today  Surgery Date: Tentative August-September 2024  Deadline December 2024  Surgeon: Dr. Heydi Gilbert  Interested in sleeve gastrectomy    Nutrition Assessment   Oswald Marvin  49 y.o.  male   2/23/2024 Initial Evaluation Weight: 256.7lbs  Wt with BMI of 25: 170.54  Pre-Op Excess Wt: 86.16lbs  Wt Readings from Last 1 Encounters:   05/07/24 120 kg (264 lb)     Ht Readings from Last 1 Encounters:   05/07/24 5' 10\" (1.778 m)     BMI Readings from Last 1 Encounters:   05/07/24 37.88 kg/m²     NAFLD Fibrosis Score is: -2.167=F0-F2=Hepatology not needed    NAFLD Score Correlated Fibrosis Severity   <-1.455 F0-F2   -1.455-0.676 Indeterminate Score   >0.676 F3-F4   **Fibrosis Severity Scale: F0 = no fibrosis; F1= mild fibrosis; F2 = moderate fibrosis; F3 = severe fibrosis; F4 = cirrhosis    NAFLD Score Component Values:  Component Value Date   Age: 49 y.o.     BMI: 37.88 kg/m²    IFG or DM: Yes    AST: 53 U/L 2/23/2024   ALT: 109 U/L 2/23/2024   Platelet: 341 Thousands/uL 2/23/2024   Albumin: 4.5 g/dL 2/23/2024     Cole Matt Equation:    BMR= 2041kcal  Weight Maintenance= 2449kcal  Estimated calories for weight loss 1449-1949kcal (1-2# per wk wt loss - sedentary)  Estimated protein needs 77.5-116.3g (1.0-1.5 gms/kg IBW)  Estimated fluid needs 2325-2713ml(30-35 ml/kg IBW)    Weight History   Onset of Obesity: Adult-about 10 years ago when he stopped working at a very physically demanding job.  Family history of obesity: Yes: mother had RNY bariatric surgery, passed away one year later.  Wt Loss Attempts: Counseling with  MD  Exercise  OTC meds/supplements  Self Created Diets (Portion Control, Healthy Food Choices, etc.)  Patient has tried the above for 6 months or more with insufficient weight loss or weight regain, which is why patient has requested to be evaluated for weight loss surgery today  Maximum Wt Lost: LA " Fitness exercise and supplements- got down to 230lbs    Review of History and Medications   Past Medical History:   Diagnosis Date    Colon polyp     Diabetes (HCC)     Diabetes mellitus (HCC)     GERD (gastroesophageal reflux disease)     Hyperlipidemia     YURIY on CPAP      Past Surgical History:   Procedure Laterality Date    COLONOSCOPY      KY LAPS ABD PRTM&OMENTUM DX W/WO SPEC BR/WA SPX N/A 2022    Procedure: LAPAROSCOPY DIAGNOSTIC, LYSIS OF ADHESIONS, REDUCTION OF INTERNAL HERNIA;  Surgeon: Raymond Núñez MD;  Location: [facility];  Service: General    VASECTOMY       Social History     Socioeconomic History    Marital status: /Civil Union     Spouse name: Not on file    Number of children: Not on file    Years of education: Not on file    Highest education level: Not on file   Occupational History    Not on file   Tobacco Use    Smoking status: Former     Current packs/day: 0.00     Average packs/day: 3.0 packs/day for 15.0 years (45.0 ttl pk-yrs)     Types: Cigarettes     Quit date: 3/16/2012     Years since quittin.1     Passive exposure: Never    Smokeless tobacco: Never    Tobacco comments:     8 YRS AGO   Vaping Use    Vaping status: Never Used   Substance and Sexual Activity    Alcohol use: Never    Drug use: Never    Sexual activity: Yes     Partners: Female   Other Topics Concern    Not on file   Social History Narrative    Not on file     Social Determinants of Health     Financial Resource Strain: Not on file   Food Insecurity: No Food Insecurity (2019)    Received from Kevin Brown    Hunger Vital Sign     Worried About Running Out of Food in the Last Year: Never true     Ran Out of Food in the Last Year: Never true   Transportation Needs: Not on file   Physical Activity: Not on file   Stress: Not on file   Social Connections: Not on file   Intimate Partner Violence: Not on file   Housing Stability: Not on file       Current Outpatient Medications:     atorvastatin  (LIPITOR) 20 mg tablet, Take 1 tablet (20 mg total) by mouth daily, Disp: 90 tablet, Rfl: 1    [START ON 8/15/2024] atovaquone-proguanil (MALARONE) 250-100 mg, Take 1 tablet by mouth daily with breakfast for 18 days Begin 1 day before entering malaria area and continue daily while in malaria area and for 1 week after leaving area Do not start before August 15, 2024., Disp: 18 tablet, Rfl: 1    Blood Glucose Monitoring Suppl (OneTouch Verio Reflect) w/Device KIT, Check blood sugars once daily. Please substitute with appropriate alternative as covered by patient's insurance. Dx: E11.65, Disp: 1 kit, Rfl: 0    dulaglutide (Trulicity) 1.5 MG/0.5ML injection, Inject 0.5 mL (1.5 mg total) under the skin every 7 days, Disp: 6 mL, Rfl: 1    glucose blood (OneTouch Verio) test strip, Check blood sugars once daily. Please substitute with appropriate alternative as covered by patient's insurance. Dx: E11.65, Disp: 100 each, Rfl: 3    ketoconazole (NIZORAL) 2 % cream, Apply topically daily (Patient not taking: Reported on 4/17/2024), Disp: 30 g, Rfl: 0    metFORMIN (GLUCOPHAGE) 1000 MG tablet, Take 1 tablet (1,000 mg total) by mouth 2 (two) times a day, Disp: 180 tablet, Rfl: 1    omeprazole (PriLOSEC) 40 MG capsule, TAKE 1 CAPSULE BY MOUTH EVERY DAY BEFORE BREAKFAST, Disp: 90 capsule, Rfl: 1    OneTouch Delica Lancets 33G MISC, Check blood sugars once daily. Please substitute with appropriate alternative as covered by patient's insurance. Dx: E11.65, Disp: 100 each, Rfl: 3    venlafaxine (EFFEXOR-XR) 75 mg 24 hr capsule, TAKE 1 CAPSULE BY MOUTH DAILY WITH BREAKFAST., Disp: 30 capsule, Rfl: 5    Food Intake and Lifestyle Assessment   Food Intake Assessment completed via usual diet recall  Works 7am to 6-10pm  Pt reports he has started following a 16-8hr fasting schedule: 16hrs fasting, 8hr eating window:  1:00pm: Chik federico-a grilled chicken wrap on lettuce instead of bun/wrap  3:00-3:30pm: grapes, 2 tim chip cookies  5:00pm:  tilapia and green beans  7:00pm: three cutie oranges  Ice cream once a week now instead of every night  Protein supplement: bought protein drinks, has not started yet  Estimated protein intake per day: 60g  Estimated fluid intake per day: 1 gallon of water per day, 30oz unsweetened iced tea  Meals eaten away from home: was previously all of them=14 per week, now eating dinners at home and choosing healthier options when ordering out.  Typical meal pattern: 3 meals per day and 1-2 snacks per day  Eating Behaviors: Craves sweet foods: reports ice cream is his favorite.  Pt has been trying to decrease his portions, not skip meals and eat more regularly during the day.  Pt has been trying to make healthier choices when ordering out, focusing on lean protein and vegetables.  Pt is not yet food journaling.  Pt reports he struggles with a large appetite, feels like he needs a large amount of food to stop being hungry, admits to being a fast eater.  Pt is drinking only sugar-free, non-carbonated, decaffeinated beverages.  Pt is trying to practice the 30/60 rule.    Food allergies or intolerances: No Known Allergies [facility]  Cultural or Roman Catholic considerations: none noted     Physical Assessment  Physical Activity  Types of exercise: Pt has been walking on his treadmill at home for 60 minutes 2-3 days per week.  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  His son will be taking over his job while he is recovering from his bariatric surgery.  Will be going on mission trip to [place] in mid- to late August.     Nutrition Diagnosis-continued  Diagnosis: Overweight / Obesity (NC-3.3), Excessive energy intake (NI-1.5), Inappropriate intake of carbohydrates (NI-5.8.3) and Undesirable food choices (NB-1.7)  Related to: Limited adherence  "to nutrition-related recommendations, Physical inactivity and Excessive energy intake  As Evidenced by: BMI >25, Excessive energy intake and Unintentional weight gain      Nutrition Prescription: Recommend the following diet  Low fat, Low sugar, High protein, Regular, and consistent carbohydrate    Interventions and Teaching   Discussed pre-op and post-op nutrition guidelines.       Patient educated and handouts provided.  Surgical changes to stomach / GI  Capacity of post-surgery stomach  Diet progression  Adequate hydration  Sugar and fat restriction to decrease \"dumping syndrome\"  Exercise  Suggestions for pre-op diet  Nutrition considerations after surgery  Protein supplements  Dietary and lifestyle changes  Possible problems with poor eating habits  Techniques for self monitoring and keeping daily food journal  Potential for food intolerance after surgery, and ways to deal with them including: lactose intolerance, nausea, reflux, vomiting, diarrhea, food intolerance, appetite changes, gas  Vitamin / Mineral supplementation of Multivitamin with minerals, Calcium, Vitamin B12, and Iron  Pt reports he currently takes a men's Centrum OTC MVI and vitamin B1    Education provided to: patient  Barriers to learning: No barriers identified  Readiness to change: preparation/action  Prior research on procedure: discussed with provider and friends or family  Comprehension: needs reinforcement and verbalizes understanding   Expected Compliance: good    Recommendations  Pt is an appropriate candidate for surgery. Yes    Minimum BMI of 35= 238.75lbs    Evaluation / Monitoring  Dietitian to Monitor: Eating pattern as discussed Tolerance of nutrition prescription Body weight Lab values Physical activity Bowel pattern    Goals  Eliminate sugar sweetened beverages, Food journal, Exercise 30 minutes 5 times per week, Complete lession plans 1-6, Eat 3 meals per day, and Eliminate mindless snacking  Change fasting schedule to " 12hr-12hr, eat four times daily 3-4 hours between each meal.  Practice tricks to take small bites, eat slowly, and chew to liquid consistency.  Practice habits on pre-op checklist in chapter two  Get bloodwork done and cardiology scheduled in the next couple months.    Workflow: (Incomplete in Bold):  Psych and/or D+A Clearance: N/A  Blood Work:   2/23/2024: CBC, CMP, HgbA1c done.  Need updated after 8/23/24. HgbA1c+CMP ordered by PCP 3/8/24  3/11/2023: Lipids+TSH done- need updated  CBC, Lipid panel, TSH ordered 5/17/24.  PCP letter: done 12/8/23  Surgeon Appt: done 3/14/24  EGD: done 10/5/23  Cardiac Risk Assessment with ECG: not done-flier provided today  Sleep Studies: using CPAP  Nicotine test: nonsmoker  Pre-Operative Program: 6 months required:  1 of 6 3/14/24 with surgeon  2 of 6 4/16/24 with SW  3 of 6 5/17/24 with RD  4 of 6 6/13/24 with SW  5 of 6 7/  6 of 6 8/  NAFLD Score Calculated: NAFLD Fibrosis Score is: -1.646=F0-F2  Hepatology consult: N/A    Time Spent:   30 minutes

## 2024-05-17 ENCOUNTER — CLINICAL SUPPORT (OUTPATIENT)
Dept: BARIATRICS | Facility: CLINIC | Age: 49
End: 2024-05-17

## 2024-05-17 DIAGNOSIS — E78.5 HYPERLIPIDEMIA, UNSPECIFIED HYPERLIPIDEMIA TYPE: ICD-10-CM

## 2024-05-17 DIAGNOSIS — G47.33 OSA (OBSTRUCTIVE SLEEP APNEA): ICD-10-CM

## 2024-05-17 DIAGNOSIS — E66.9 OBESITY, CLASS II, BMI 35-39.9: Primary | ICD-10-CM

## 2024-05-17 DIAGNOSIS — E11.9 TYPE 2 DIABETES MELLITUS WITHOUT COMPLICATION, WITHOUT LONG-TERM CURRENT USE OF INSULIN (HCC): ICD-10-CM

## 2024-05-17 PROCEDURE — RECHECK: Performed by: DIETITIAN, REGISTERED

## 2024-06-12 ENCOUNTER — TELEPHONE (OUTPATIENT)
Age: 49
End: 2024-06-12

## 2024-06-14 ENCOUNTER — OFFICE VISIT (OUTPATIENT)
Dept: SLEEP CENTER | Facility: CLINIC | Age: 49
End: 2024-06-14
Payer: COMMERCIAL

## 2024-06-14 ENCOUNTER — OFFICE VISIT (OUTPATIENT)
Dept: FAMILY MEDICINE CLINIC | Facility: CLINIC | Age: 49
End: 2024-06-14
Payer: COMMERCIAL

## 2024-06-14 ENCOUNTER — APPOINTMENT (OUTPATIENT)
Dept: LAB | Facility: CLINIC | Age: 49
End: 2024-06-14
Payer: COMMERCIAL

## 2024-06-14 VITALS
SYSTOLIC BLOOD PRESSURE: 122 MMHG | BODY MASS INDEX: 37.77 KG/M2 | WEIGHT: 263.8 LBS | DIASTOLIC BLOOD PRESSURE: 80 MMHG | HEIGHT: 70 IN | HEART RATE: 80 BPM | OXYGEN SATURATION: 99 %

## 2024-06-14 VITALS
WEIGHT: 264 LBS | HEART RATE: 65 BPM | BODY MASS INDEX: 37.8 KG/M2 | SYSTOLIC BLOOD PRESSURE: 127 MMHG | DIASTOLIC BLOOD PRESSURE: 79 MMHG | HEIGHT: 70 IN

## 2024-06-14 DIAGNOSIS — E11.9 TYPE 2 DIABETES MELLITUS WITHOUT COMPLICATION, WITHOUT LONG-TERM CURRENT USE OF INSULIN (HCC): ICD-10-CM

## 2024-06-14 DIAGNOSIS — Z00.00 ROUTINE ADULT HEALTH MAINTENANCE: Primary | ICD-10-CM

## 2024-06-14 DIAGNOSIS — E78.5 HYPERLIPIDEMIA, UNSPECIFIED HYPERLIPIDEMIA TYPE: ICD-10-CM

## 2024-06-14 DIAGNOSIS — F32.A ANXIETY AND DEPRESSION: ICD-10-CM

## 2024-06-14 DIAGNOSIS — F41.9 ANXIETY: ICD-10-CM

## 2024-06-14 DIAGNOSIS — G47.33 OSA (OBSTRUCTIVE SLEEP APNEA): Primary | ICD-10-CM

## 2024-06-14 DIAGNOSIS — G47.19 EXCESSIVE DAYTIME SLEEPINESS: ICD-10-CM

## 2024-06-14 DIAGNOSIS — G47.8 NON-RESTORATIVE SLEEP: ICD-10-CM

## 2024-06-14 DIAGNOSIS — E66.9 OBESITY, CLASS II, BMI 35-39.9: ICD-10-CM

## 2024-06-14 DIAGNOSIS — R74.8 ELEVATED LIVER ENZYMES: ICD-10-CM

## 2024-06-14 DIAGNOSIS — F41.9 ANXIETY AND DEPRESSION: ICD-10-CM

## 2024-06-14 DIAGNOSIS — G47.33 OSA (OBSTRUCTIVE SLEEP APNEA): ICD-10-CM

## 2024-06-14 DIAGNOSIS — Z23 ENCOUNTER FOR IMMUNIZATION: ICD-10-CM

## 2024-06-14 LAB
ALBUMIN SERPL BCP-MCNC: 4.4 G/DL (ref 3.5–5)
ALP SERPL-CCNC: 91 U/L (ref 34–104)
ALT SERPL W P-5'-P-CCNC: 114 U/L (ref 7–52)
ANION GAP SERPL CALCULATED.3IONS-SCNC: 7 MMOL/L (ref 4–13)
AST SERPL W P-5'-P-CCNC: 53 U/L (ref 13–39)
BILIRUB SERPL-MCNC: 0.57 MG/DL (ref 0.2–1)
BUN SERPL-MCNC: 13 MG/DL (ref 5–25)
CALCIUM SERPL-MCNC: 9.7 MG/DL (ref 8.4–10.2)
CHLORIDE SERPL-SCNC: 101 MMOL/L (ref 96–108)
CHOLEST SERPL-MCNC: 308 MG/DL
CO2 SERPL-SCNC: 30 MMOL/L (ref 21–32)
CREAT SERPL-MCNC: 0.88 MG/DL (ref 0.6–1.3)
ERYTHROCYTE [DISTWIDTH] IN BLOOD BY AUTOMATED COUNT: 13.2 % (ref 11.6–15.1)
GFR SERPL CREATININE-BSD FRML MDRD: 102 ML/MIN/1.73SQ M
GLUCOSE P FAST SERPL-MCNC: 147 MG/DL (ref 65–99)
HCT VFR BLD AUTO: 42.7 % (ref 36.5–49.3)
HDLC SERPL-MCNC: 39 MG/DL
HGB BLD-MCNC: 14.3 G/DL (ref 12–17)
LDLC SERPL CALC-MCNC: 238 MG/DL (ref 0–100)
MCH RBC QN AUTO: 28.5 PG (ref 26.8–34.3)
MCHC RBC AUTO-ENTMCNC: 33.5 G/DL (ref 31.4–37.4)
MCV RBC AUTO: 85 FL (ref 82–98)
NONHDLC SERPL-MCNC: 269 MG/DL
PLATELET # BLD AUTO: 285 THOUSANDS/UL (ref 149–390)
PMV BLD AUTO: 10.1 FL (ref 8.9–12.7)
POTASSIUM SERPL-SCNC: 4.6 MMOL/L (ref 3.5–5.3)
PROT SERPL-MCNC: 7.4 G/DL (ref 6.4–8.4)
RBC # BLD AUTO: 5.02 MILLION/UL (ref 3.88–5.62)
SL AMB POCT HEMOGLOBIN AIC: 7.4 (ref ?–6.5)
SODIUM SERPL-SCNC: 138 MMOL/L (ref 135–147)
TRIGL SERPL-MCNC: 155 MG/DL
TSH SERPL DL<=0.05 MIU/L-ACNC: 1.12 UIU/ML (ref 0.45–4.5)
WBC # BLD AUTO: 6.13 THOUSAND/UL (ref 4.31–10.16)

## 2024-06-14 PROCEDURE — 80061 LIPID PANEL: CPT

## 2024-06-14 PROCEDURE — 90677 PCV20 VACCINE IM: CPT

## 2024-06-14 PROCEDURE — 85027 COMPLETE CBC AUTOMATED: CPT

## 2024-06-14 PROCEDURE — 99214 OFFICE O/P EST MOD 30 MIN: CPT | Performed by: INTERNAL MEDICINE

## 2024-06-14 PROCEDURE — 83036 HEMOGLOBIN GLYCOSYLATED A1C: CPT | Performed by: FAMILY MEDICINE

## 2024-06-14 PROCEDURE — 36415 COLL VENOUS BLD VENIPUNCTURE: CPT

## 2024-06-14 PROCEDURE — 80053 COMPREHEN METABOLIC PANEL: CPT

## 2024-06-14 PROCEDURE — 99214 OFFICE O/P EST MOD 30 MIN: CPT | Performed by: FAMILY MEDICINE

## 2024-06-14 PROCEDURE — 99396 PREV VISIT EST AGE 40-64: CPT | Performed by: FAMILY MEDICINE

## 2024-06-14 PROCEDURE — 90471 IMMUNIZATION ADMIN: CPT

## 2024-06-14 PROCEDURE — 84443 ASSAY THYROID STIM HORMONE: CPT

## 2024-06-14 RX ORDER — DULAGLUTIDE 0.75 MG/.5ML
0.75 INJECTION, SOLUTION SUBCUTANEOUS
Qty: 6 ML | Refills: 1 | Status: SHIPPED | OUTPATIENT
Start: 2024-06-14

## 2024-06-14 NOTE — PATIENT INSTRUCTIONS
Work on diet as discussed. Check other pharmacies if unable to get Trulicity.   Recheck in 3 months.

## 2024-06-14 NOTE — ASSESSMENT & PLAN NOTE
Advised on diet and exercise; advised on living will; updated PCV; UTD on colonoscopy; check labs; reviewed preventative measures

## 2024-06-14 NOTE — ASSESSMENT & PLAN NOTE
A1C elevated from last visit likely secondary to diet changes and being off Trulicity; will reorder lower dosage and advised to call other pharmacies if not available; recheck in 3 months  Lab Results   Component Value Date    HGBA1C 7.4 (A) 06/14/2024

## 2024-06-14 NOTE — PROGRESS NOTES
Follow-Up Note - [facility]   Oswald Marvin  49 y.o. male  :1975  MRN:[id number]  DOS:2024    CC: I saw this patient for follow-up in clinic today for Sleep disordered breathing, Coexisting Sleep and Medical Problems.  He was previously under care of Dr. Cabrera and I reviewed clinic records.. Interval changes: Patient recently had a diagnostic sleep study and treatment was initiated using auto titrating PAP.  He was diagnosed with obstructive sleep apnea in  and has been using CPAP since.  Study was undertaken to justify replacement of his equipment.  He is here to review results /and adjust therapy.      The study demonstrated an apnea/hypopnea index (AHI) of 40.3 events per hour of sleep.  The AHI in the supine position was N/A.  The AHI during REM sleep was N/A. The baseline oxygen saturation with the patient awake was 95.2%.  The mean oxygen saturation throughout the study was 94.6%.  The amount of sleep time below 90% was 0.0 minutes.  The lowest oxygen saturation was 89.0%.  Severity is likely understated because he did not discontinue CPAP prior to the study and he was not observed during stage REM    PFSH, Problem List, Medications & Allergies were reviewed in EMR.   He  has a past medical history of Colon polyp, Diabetes (HCC), Diabetes mellitus (HCC), GERD (gastroesophageal reflux disease), Hyperlipidemia, and YURIY on CPAP.    He has a current medication list which includes the following prescription(s): atorvastatin, [START ON 8/15/2024] atovaquone-proguanil, onetouch verio reflect, trulicity, onetouch verio, metformin, omeprazole, onetouch delica lancets 33g, and venlafaxine.    PHYSIOLOGICAL DATA REVIEW : Using PAP > 4 hours/night 100%. Estimated JODY 4.7/hour with pressure of 18.5cm H2O@90th/95th percentile; patient has not been using non FDA approved devices to sanitize the machine.  INTERPRETATION: Compliance is excellent; Pressure setting is:within target range; ;   SUBJECTIVE:  "With respect to use of PAP, Oswald  is experiencing no adverse effects:.He derives benefit, but is dissatisfied with sleep and daytime function.   Sleep Routine: Oswald reports getting 7-10 hrs sleep; he has no difficulty initiating or maintaining sleep . He arises needing multiple alarms  and never feels rested.Oswald reports excessive daytime sleepiness, feels like napping but avoids.  He rated himself at Total score: 19 /24 on the [place] Sleepiness Scale.   Other issues: None reported.     Habits: Reports that he quit smoking about 12 years ago. His smoking use included cigarettes. He has a 45 pack-year smoking history. He has never been exposed to tobacco smoke. He has never used smokeless tobacco.,  Reports no history of alcohol use.,  Reports no history of drug use., Caffeine use:moderate; Exercise routine: none.      ROS: Significant for weight has been stable within 5 to 10 pounds.  Acid reflux is controlled.  He feels mood is stable on current medication.  Last hemoglobin A1c was 7.4 mg percent - he has a prescription for Trulicity but has not been able to get the medication.  A 10 point review of systems was otherwise negative..    EXAM: /79 (BP Location: Left arm, Patient Position: Sitting, Cuff Size: Large)   Pulse 65   Ht 5' 10\" (1.778 m)   Wt 120 kg (264 lb)   BMI 37.88 kg/m²     Wt Readings from Last 3 Encounters:   06/14/24 120 kg (264 lb)   06/14/24 120 kg (263 lb 12.8 oz)   05/07/24 120 kg (264 lb)      Patient is well groomed; well appearing.   CNS: Alert, orientated, speech clear & coherent  Psych: cooperative and in no distress. Mental state: Appears normal.  H&N: EOMI; NC/AT: No facial pressure marks, no rashes.    Skin/Extrem: col & hydration normal; no edema  Resp: Respiratory effort is normal  Physical findings otherwise essentially unchanged from previous.    IMPRESSION: Problem List Items & Comorbidities Addressed this Visit    1. YURIY (obstructive sleep apnea)  " "CPAP Study    PAP DME Resupply/Reorder    PAP DME Pressure Change      2. Non-restorative sleep  CPAP Study      3. Excessive daytime sleepiness  CPAP Study      4. Anxiety and depression        5. Type 2 diabetes mellitus without complication, without long-term current use of insulin (HCC)        6. BMI 39.0-39.9,adult            PLAN:  I reviewed results of prior studies and physiologic data with the patient.   I discussed treatment options with risks and benefits.  Treatment with  PAP is medically necessary and Oswald is agreable to continue use. In view of ongoing symptoms and spite of regular use of CPAP, a in lab titration study is warranted.  Care of equipment, methods to improve comfort using PAP and importance of compliance with therapy were discussed.  Pressure setting: change 14-20 cmH2O.    Rx provided to replace supplies and Care coordinated with DME provider.   Discussed strategies for weight reduction and advised regular exercise to improve control of his diabetes.    Follow-up is advised in after the study and initiation of therapy to monitor progress, compliance and to adjust therapy].     Thank you for allowing me to participate in the care of this patient.    Sincerely,     Authenticated electronically on 06/14/24   Board Certified Specialist     Portions of the record may have been created with voice recognition software. Occasional wrong word or \"sound a like\" substitutions may have occurred due to the inherent limitations of voice recognition software. There may also be notations and random deletions of words or characters from malfunctioning software. Read the chart carefully and recognize, using context, where substitutions/deletions have occurred.    "

## 2024-06-14 NOTE — PROGRESS NOTES
Adult Annual Physical  Name: Oswald Marvin      : 1975      MRN: [id number]  Encounter Provider: Anastacia Hernandez DO  Encounter Date: 2024   Encounter department: [facility]    Assessment & Plan   1. Routine adult health maintenance  Assessment & Plan:  Advised on diet and exercise; advised on living will; updated PCV; UTD on colonoscopy; check labs; reviewed preventative measures  2. Type 2 diabetes mellitus without complication, without long-term current use of insulin (HCC)  Assessment & Plan:  A1C elevated from last visit likely secondary to diet changes and being off Trulicity; will reorder lower dosage and advised to call other pharmacies if not available; recheck in 3 months  Lab Results   Component Value Date    HGBA1C 7.4 (A) 2024     Orders:  -     POCT hemoglobin A1c  -     dulaglutide (Trulicity) 0.75 MG/0.5ML injection; Inject 0.5 mL (0.75 mg total) under the skin every 7 days  3. BMI 37.0-37.9, adult  Assessment & Plan:  Restart trulicity; advised on diet modifications; planning for bariatric surgery  4. Hyperlipidemia, unspecified hyperlipidemia type  Assessment & Plan:  Repeat labs; c/w statin  5. YURIY (obstructive sleep apnea)  Assessment & Plan:  Pt c/o fatigue with sleep medicine  6. Encounter for immunization  -     Pneumococcal Conjugate Vaccine 20-valent (Pcv20)  7. Anxiety  Assessment & Plan:  Stable; c/w current tx    Immunizations and preventive care screenings were discussed with patient today. Appropriate education was printed on patient's after visit summary.        Counseling:  Dental Health: discussed importance of regular tooth brushing, flossing, and dental visits.  Exercise: the importance of regular exercise/physical activity was discussed. Recommend exercise 3-5 times per week for at least 30 minutes.       Depression Screening and Follow-up Plan: Patient was screened for depression during today's encounter. They screened negative  "with a PHQ-9 score of 0.      History of Present Illness     Adult Annual Physical:  Patient presents for annual physical. Patient is here for annual exam and follow up on DM and anxiety.    Pt admits to not being as adherent to diet. Did not complete labs yet but will go down today to complete. A1C increased from last visit. [facility] with [facility] and weight loss. However, unable to get from [facility] since March. Drinking soda again. Admits to an increase in sweets.    Anxiety: Sx have been stable. Feels controlled on current medication. .     Diet and Physical Activity:  - Diet/Nutrition: frequent junk food. started drinking soda again and gain some weight back  - Exercise: no formal exercise. hard to find time with work    Depression Screening:    - PHQ-9 Score: 0    General Health:  - Sleep: > 8 hours of sleep on average. YURIY difficulty with new machine  - Hearing: normal hearing bilateral ears.  - Vision: goes for regular eye exams.  - Dental: regular dental visits.    Advanced Care Planning:  - Has an advanced directive?: no    - Has a durable medical POA?: no    - ACP document given to patient?: no      Review of Systems   Constitutional:  Positive for fatigue. Negative for chills and fever.   Respiratory:  Negative for shortness of breath.    Cardiovascular:  Negative for chest pain, palpitations and leg swelling.   Gastrointestinal:  Negative for abdominal pain and blood in stool.   Psychiatric/Behavioral:  Negative for dysphoric mood. The patient is not nervous/anxious.      Pertinent Medical History         Objective     /80 (BP Location: Left arm, Patient Position: Sitting, Cuff Size: Large)   Pulse 80   Ht 5' 10\" (1.778 m)   Wt 120 kg (263 lb 12.8 oz)   SpO2 99%   BMI 37.85 kg/m²     Physical Exam  Vitals reviewed.   Constitutional:       General: He is not in acute distress.     Appearance: Normal appearance. He is obese. He is not ill-appearing, " toxic-appearing or diaphoretic.   HENT:      Head: Normocephalic and atraumatic.      Right Ear: Tympanic membrane, ear canal and external ear normal. There is no impacted cerumen.      Left Ear: Tympanic membrane, ear canal and external ear normal. There is no impacted cerumen.      Nose: Nose normal. No congestion or rhinorrhea.      Mouth/Throat:      Mouth: Mucous membranes are moist.      Pharynx: Oropharynx is clear. No oropharyngeal exudate or posterior oropharyngeal erythema.   Eyes:      General: No scleral icterus.        Right eye: No discharge.         Left eye: No discharge.      Conjunctiva/sclera: Conjunctivae normal.      Pupils: Pupils are equal, round, and reactive to light.   Cardiovascular:      Rate and Rhythm: Normal rate and regular rhythm.      Pulses: Normal pulses.      Heart sounds: Normal heart sounds.   Pulmonary:      Effort: Pulmonary effort is normal.      Breath sounds: Normal breath sounds.   Abdominal:      General: Abdomen is flat. There is no distension.      Tenderness: There is no abdominal tenderness. There is no guarding or rebound.   Musculoskeletal:      Cervical back: Neck supple. No rigidity.      Right lower leg: No edema.      Left lower leg: No edema.   Lymphadenopathy:      Cervical: No cervical adenopathy.   Skin:     Findings: No rash.   Neurological:      General: No focal deficit present.      Mental Status: He is alert and oriented to person, place, and time.   Psychiatric:         Mood and Affect: Mood normal.         Behavior: Behavior normal.         Thought Content: Thought content normal.         Judgment: Judgment normal.     Administrative Statements

## 2024-06-17 ENCOUNTER — CLINICAL SUPPORT (OUTPATIENT)
Dept: BARIATRICS | Facility: CLINIC | Age: 49
End: 2024-06-17

## 2024-06-17 DIAGNOSIS — E66.9 OBESITY, CLASS II, BMI 35-39.9: Primary | ICD-10-CM

## 2024-06-17 PROCEDURE — RECHECK

## 2024-06-17 NOTE — PROGRESS NOTES
"Behavioral Health Follow Up Note:      4 / 6  Weight Check:  Surgeon: Dr. Heydi Gilbert  (sleeve)      Starting weight 256.7 #  Today's weight 260 #      Surgery month:  Aug/ Sept  Surgery deadline: December     Mental health and wellness - Patient is appropriately making changes based off the information contained in the bariatric manual.  Patient is modifying behaviors and demonstrating adapting to change.  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   Does not like cold foods that should be hot..     Eating behaviors - pre planning sporadically.   30/60 rule  Meal replacement for shakes.  Increased fruit and veggies.  Decreased ice cream and soda intake.  Taking vitamins     Activity -  [employer].  Walking with dog daily     Progress toward program requirements    Workflow:  Psych and/or D+A additional documentation  N/A  PCP Letter: 12/8/2023  Support Group: RECOMMENDED  Surgeon Appt.: 3/14/2024  EGD : done  Cardiac Risk Assessment: 7/3/2024  Sleep Studies: N/A   -- may have a new study done.  11/29/2024  Bloodwork: will obtain.       Goals:  continue following bariatric manual   "

## 2024-06-18 ENCOUNTER — TELEPHONE (OUTPATIENT)
Dept: SLEEP CENTER | Facility: CLINIC | Age: 49
End: 2024-06-18

## 2024-06-18 ENCOUNTER — HOSPITAL ENCOUNTER (OUTPATIENT)
Dept: SLEEP CENTER | Facility: CLINIC | Age: 49
Discharge: HOME | End: 2024-06-18
Payer: COMMERCIAL

## 2024-06-18 DIAGNOSIS — G47.33 OSA (OBSTRUCTIVE SLEEP APNEA): ICD-10-CM

## 2024-06-18 DIAGNOSIS — G47.8 NON-RESTORATIVE SLEEP: ICD-10-CM

## 2024-06-18 DIAGNOSIS — G47.19 EXCESSIVE DAYTIME SLEEPINESS: ICD-10-CM

## 2024-06-18 PROCEDURE — 95811 POLYSOM 6/>YRS CPAP 4/> PARM: CPT

## 2024-06-18 NOTE — PROGRESS NOTES
Sleep Study Documentation    Pre-Sleep Study       Sleep testing procedure explained to patient:YES    Patient napped prior to study:NO    Caffeine:Dayshift worker after 12PM.  Caffeine use:NO    Alcohol:Dayshift workers after 5PM: Alcohol use:NO    Typical day for patient:YES       Study Documentation    Sleep Study Indications: In lab diagnostic study with AHI>5    Sleep Study: Treatment   Optimal PAP pressure: +23/19cm H2o  Leak:None  Snore:Not eliminated  REM Obtained:yes  Supplemental O2: no    Minimum SaO2 89%  Baseline SaO2 95%  PAP mask choice (final)ResMed AirTouch F20  PAP mask type:full face  Minimum SaO2 at final PAP pressure 95%  Mode of Therapy:BiPAP  ETCO2:No  CPAP changed to BiPAP:Yes. If yes why Failed CPAP    EKG abnormalities: no     EEG abnormalities: no    Were abnormal behaviors in sleep observed:NO    Is Total Sleep Study Recording Time < 2 hours: N/A    Is Total Sleep Study Recording Time > 2 hours but study is incomplete: N/A    Is Total Sleep Study Recording Time 6 hours or more but sleep was not obtained: NO    Patient classification: employed       Post-Sleep Study    Medication used at bedtime or during sleep study:NO    Patient reports time it took to fall asleep:less than 20 minutes    Patient reports waking up during study:3 or more times.  Patient reports returning to sleep without difficulty.    Patient reports sleeping 6 to 8 hours without dreaming.    Does the Patient feel this is a typical night of sleep:typical    Patient rated sleepiness: Somewhat sleepy or tired    PAP treatment:yes: Post PAP treatment patient reports feeling unchanged and would wear PAP mask at home.

## 2024-06-18 NOTE — TELEPHONE ENCOUNTER
Rx for pressure change and resupply sent to [facility] via parach[facility].

## 2024-06-19 DIAGNOSIS — G47.33 OSA (OBSTRUCTIVE SLEEP APNEA): Primary | ICD-10-CM

## 2024-06-20 LAB

## 2024-06-24 ENCOUNTER — TELEPHONE (OUTPATIENT)
Age: 49
End: 2024-06-24

## 2024-06-24 NOTE — TELEPHONE ENCOUNTER
Patient called in to R/S [address] appointment from 7/19, please give him a call at [phone]. Thanks.

## 2024-06-28 ENCOUNTER — TELEPHONE (OUTPATIENT)
Dept: SLEEP CENTER | Facility: CLINIC | Age: 49
End: 2024-06-28

## 2024-06-28 DIAGNOSIS — E78.5 HYPERLIPIDEMIA, UNSPECIFIED HYPERLIPIDEMIA TYPE: ICD-10-CM

## 2024-06-28 DIAGNOSIS — E66.9 OBESITY, CLASS II, BMI 35-39.9: ICD-10-CM

## 2024-06-28 DIAGNOSIS — Z01.818 PRE-OPERATIVE CLEARANCE: Primary | ICD-10-CM

## 2024-06-28 DIAGNOSIS — E11.9 TYPE 2 DIABETES MELLITUS WITHOUT COMPLICATION, WITHOUT LONG-TERM CURRENT USE OF INSULIN (HCC): ICD-10-CM

## 2024-06-28 DIAGNOSIS — R74.8 ELEVATED LIVER ENZYMES: ICD-10-CM

## 2024-06-28 NOTE — TELEPHONE ENCOUNTER
Patient left message requesting results of CPAP study.    CPAP study shows BiPAP effective to remediate sleep apnea. BiPAP ordered.  Per chart, patient currently using CPAP.     BiPAP Rx and clinicals faxed to [facility].  Patient will need to schedule compliance follow up appointment.    Called patient and left message advising I will send a Familiarhart message with the sleep study results and next steps.  Provided sleep center number([phone]) to call if any questions.

## 2024-07-03 ENCOUNTER — CONSULT (OUTPATIENT)
Dept: CARDIOLOGY CLINIC | Facility: CLINIC | Age: 49
End: 2024-07-03
Payer: COMMERCIAL

## 2024-07-03 VITALS
WEIGHT: 269 LBS | HEART RATE: 83 BPM | BODY MASS INDEX: 38.51 KG/M2 | SYSTOLIC BLOOD PRESSURE: 120 MMHG | HEIGHT: 70 IN | DIASTOLIC BLOOD PRESSURE: 68 MMHG

## 2024-07-03 DIAGNOSIS — Z01.810 PREOPERATIVE CARDIOVASCULAR EXAMINATION: Primary | ICD-10-CM

## 2024-07-03 DIAGNOSIS — E11.9 TYPE 2 DIABETES MELLITUS WITHOUT COMPLICATION, WITHOUT LONG-TERM CURRENT USE OF INSULIN (HCC): ICD-10-CM

## 2024-07-03 DIAGNOSIS — E78.5 DYSLIPIDEMIA: ICD-10-CM

## 2024-07-03 DIAGNOSIS — E66.01 SEVERE OBESITY (BMI 35.0-39.9) WITH COMORBIDITY (HCC): ICD-10-CM

## 2024-07-03 PROCEDURE — 93000 ELECTROCARDIOGRAM COMPLETE: CPT | Performed by: INTERNAL MEDICINE

## 2024-07-03 PROCEDURE — 99204 OFFICE O/P NEW MOD 45 MIN: CPT | Performed by: INTERNAL MEDICINE

## 2024-07-03 NOTE — PROGRESS NOTES
Cardiology Office Note  MD Tod Damon MD, [facility]  Dmitri Jones DO, [facility]  MD Melissa Downing DO, [facility]  Kevin Jenkins DO, [facility]  ----------------------------------------------------------------  [address] [address]    Oswald Marvin 49 y.o. male MRN: [id number]  Unit/Bed#:  Encounter: [id number]      History of Present Illness:  It was a pleasure to see Oswald Marvin in the office today for follow up CV evaluation. He has a past medical history of dyslipidemia, type 2 diabetes mellitus, discoid lupus, YURIY and obesity.  He establish care with us in July 2024. The patient was seen in the office in July 2024 for preoperative CV risk assessment.  The patient overall had been functionally active.  He had no personal history of cardiovascular disease of which she was aware.  Patient's grandparents did have evidence of CVD in the past.  He feels to be in his usual state of health.  Denies chest pain, pressure, tightness or squeezing.  Denies lightheadedness, dizziness or palpitations.  Denies lower extremity swelling orthopnea or paroxysmal nocturnal dyspnea.  He can climb multiple flights of stairs without significant exertional symptoms, but does admit to fatigue after 2 flights of stairs.  Does not have to stop walking.    Review of Systems:  Review of Systems   Constitutional: Negative for decreased appetite, fever, weight gain and weight loss.   HENT:  Negative for congestion and sore throat.    Eyes:  Negative for visual disturbance.   Cardiovascular:  Negative for chest pain, dyspnea on exertion, leg swelling, near-syncope and palpitations.   Respiratory:  Negative for cough and shortness of breath.    Hematologic/Lymphatic: Negative for bleeding problem.   Skin:  Negative for rash.   Musculoskeletal:  Negative for myalgias and neck pain.   Gastrointestinal:  Negative for abdominal pain and nausea.   Neurological:  Negative  for light-headedness and weakness.   Psychiatric/Behavioral:  Negative for depression.        Past Medical History:   Diagnosis Date    Colon polyp     Diabetes (HCC)     Diabetes mellitus (HCC)     GERD (gastroesophageal reflux disease)     Hyperlipidemia     YURIY on CPAP        Past Surgical History:   Procedure Laterality Date    COLONOSCOPY      ND LAPS ABD PRTM&OMENTUM DX W/WO SPEC BR/WA SPX N/A 2022    Procedure: LAPAROSCOPY DIAGNOSTIC, LYSIS OF ADHESIONS, REDUCTION OF INTERNAL HERNIA;  Surgeon: Raymond Núñez MD;  Location: [facility] OR;  Service: General    VASECTOMY         Social History     Socioeconomic History    Marital status: /Civil Union     Spouse name: Not on file    Number of children: Not on file    Years of education: Not on file    Highest education level: Not on file   Occupational History    Not on file   Tobacco Use    Smoking status: Former     Current packs/day: 0.00     Average packs/day: 3.0 packs/day for 15.0 years (45.0 ttl pk-yrs)     Types: Cigarettes     Quit date: 3/16/2012     Years since quittin.3     Passive exposure: Never    Smokeless tobacco: Never    Tobacco comments:     8 YRS AGO   Vaping Use    Vaping status: Never Used   Substance and Sexual Activity    Alcohol use: Never    Drug use: Never    Sexual activity: Yes     Partners: Female   Other Topics Concern    Not on file   Social History Narrative    Not on file     Social Determinants of Health     Financial Resource Strain: Not on file   Food Insecurity: No Food Insecurity (2019)    Received from [facility]    Hunger Vital Sign     Worried About Running Out of Food in the Last Year: Never true     Ran Out of Food in the Last Year: Never true   Transportation Needs: Not on file   Physical Activity: Not on file   Stress: Not on file   Social Connections: Unknown (2024)    Received from [employer]    Social Connections     How often do you feel lonely or isolated from those around you?  "(Adult - for ages 18 years and over): Not on file   Intimate Partner Violence: Not on file   Housing Stability: Not on file       Family History   Problem Relation Age of Onset    Liver disease Mother     Emphysema Father        No Known Allergies      Current Outpatient Medications:     atorvastatin (LIPITOR) 20 mg tablet, Take 1 tablet (20 mg total) by mouth daily, Disp: 90 tablet, Rfl: 1    [START ON 8/15/2024] atovaquone-proguanil (MALARONE) 250-100 mg, Take 1 tablet by mouth daily with breakfast for 18 days Begin 1 day before entering malaria area and continue daily while in malaria area and for 1 week after leaving area Do not start before August 15, 2024., Disp: 18 tablet, Rfl: 1    Blood Glucose Monitoring Suppl (OneTouch Verio Reflect) w/Device KIT, Check blood sugars once daily. Please substitute with appropriate alternative as covered by patient's insurance. Dx: E11.65, Disp: 1 kit, Rfl: 0    dulaglutide (Trulicity) 0.75 MG/0.5ML injection, Inject 0.5 mL (0.75 mg total) under the skin every 7 days, Disp: 6 mL, Rfl: 1    glucose blood (OneTouch Verio) test strip, Check blood sugars once daily. Please substitute with appropriate alternative as covered by patient's insurance. Dx: E11.65, Disp: 100 each, Rfl: 3    metFORMIN (GLUCOPHAGE) 1000 MG tablet, Take 1 tablet (1,000 mg total) by mouth 2 (two) times a day, Disp: 180 tablet, Rfl: 1    omeprazole (PriLOSEC) 40 MG capsule, TAKE 1 CAPSULE BY MOUTH EVERY DAY BEFORE BREAKFAST, Disp: 90 capsule, Rfl: 1    OneTouch Delica Lancets 33G MISC, Check blood sugars once daily. Please substitute with appropriate alternative as covered by patient's insurance. Dx: E11.65, Disp: 100 each, Rfl: 3    venlafaxine (EFFEXOR-XR) 75 mg 24 hr capsule, TAKE 1 CAPSULE BY MOUTH DAILY WITH BREAKFAST., Disp: 30 capsule, Rfl: 5    Vitals:    07/03/24 1432   BP: 120/68   Pulse: 83   Weight: 122 kg (269 lb)   Height: 5' 10\" (1.778 m)     Body mass index is 38.6 kg/m².    PHYSICAL " EXAMINATION:  Gen: Awake, Alert, NAD   Head/eyes: AT/NC, pupils equal and round, Anicteric  ENT: mmm  Neck: Supple, No elevated JVP, trachea midline  Resp: CTA bilaterally no w/r/r  CV: RRR +S1, S2, No m/r/g  Abd: Soft, obese, NT/ND + BS  Ext: no LE edema bilaterally  Neuro: Follows commands, moves all extermities  Psych: Appropriate affect, normal mood, pleasant attitude, non-combative  Skin: warm; no rash, erythema or venous stasis changes on exposed skin    --------------------------------------------------------------------------------  TREADMILL STRESS  No results found for this or any previous visit.     --------------------------------------------------------------------------------  NUCLEAR STRESS TEST: No results found for this or any previous visit.    No results found for this or any previous visit.      --------------------------------------------------------------------------------  CATH:  No results found for this or any previous visit.    --------------------------------------------------------------------------------  ECHO:   No results found for this or any previous visit.    No results found for this or any previous visit.    --------------------------------------------------------------------------------  HOLTER  No results found for this or any previous visit.    No results found for this or any previous visit.    --------------------------------------------------------------------------------  CAROTIDS  No results found for this or any previous visit.     --------------------------------------------------------------------------------  ECGs:  Results for orders placed or performed in visit on 07/03/24   POCT ECG    Impression    Sinus rhythm 83 bpm, normal ECG        Lab Results   Component Value Date    WBC 6.13 06/14/2024    HGB 14.3 06/14/2024    HCT 42.7 06/14/2024    MCV 85 06/14/2024     06/14/2024      Lab Results   Component Value Date    SODIUM 138 06/14/2024    K 4.6 06/14/2024  "    06/14/2024    CO2 30 06/14/2024    BUN 13 06/14/2024    CREATININE 0.88 06/14/2024    GLUC 152 12/28/2023    CALCIUM 9.7 06/14/2024      Lab Results   Component Value Date    HGBA1C 7.4 (A) 06/14/2024      No results found for: \"CHOL\"  Lab Results   Component Value Date    HDL 39 (L) 06/14/2024    HDL 33 (L) 03/11/2023    HDL 37 (L) 09/29/2022     Lab Results   Component Value Date    LDLCALC 238 (H) 06/14/2024    LDLCALC 109 (H) 03/11/2023    LDLCALC 144 (H) 09/29/2022     Lab Results   Component Value Date    TRIG 155 (H) 06/14/2024    TRIG 89 03/11/2023    TRIG 128 09/29/2022     No results found for: \"CHOLHDL\"   No results found for: \"INR\", \"PROTIME\"     1. Preoperative cardiovascular examination  -     POCT ECG  2. Severe obesity (BMI 35.0-39.9) with comorbidity (HCC)  -     Ambulatory referral to Cardiology  3. Dyslipidemia  4. Type 2 diabetes mellitus without complication, without long-term current use of insulin (HCC)      IMPRESSION:    Preoperative CV risk assessment for bariatric surgery  Severe obesity  Dyslipidemia  Type 2 diabetes mellitus  Discoid lupus  YURIY on CPAP    PLAN:  It was a pleasure to see Gregorio in the office today for initial CV evaluation.  He is here today for preoperative CV risk assessment prior to his upcoming bariatric surgery with gastric sleeve.  He has no chest pain concerning for angina and no signs or symptoms of heart failure.  Clinically he examines to be euvolemic.  Blood pressure and heart rate are currently stable.  He is tolerating his current medications without any reported adverse effects.  He can perform greater than 4 METS on a daily basis without significant exertional symptoms.  ECG appears nonischemic.  Based on his clinical presentation, I have the following recommendations:    1.  Given the patient's limited risk factors, good overall functional capacity and nonischemic ECG, the patient is at a low CV risk for his upcoming bariatric surgery.  No further " "CV testing prior to the OR as it would not change our management.  2.  Recommend a heart healthy diet low in sodium and carbohydrate.  3.  Would encourage 30 minutes a day, 5 days a week of moderate intensity activity to build cardiovascular endurance.  4.  Patient's cholesterol has been evaluated by primary care.  LDL appears severely elevated.  Would recommend obtaining a repeat LDL with primary care.  If LDL still remains above 190 mg/dL, I would strongly recommend increasing atorvastatin to 80 mg daily.  5.  Diabetic management as per primary care.  6.  He is otherwise up-to-date with CV testing  7.  We will follow-up with him in 1 year or as needed.    As always, please do not hesitate to call with any questions.    Portions of the record may have been created with voice recognition software. Occasional wrong word or \"sound a like\" substitutions may have occurred due to the inherent limitations of voice recognition software. Read the chart carefully and recognize, using context, where substitutions have occurred.      Signed: Dmitri Jones DO, FACC, ALLISON, FACP  "

## 2024-07-11 ENCOUNTER — CLINICAL SUPPORT (OUTPATIENT)
Dept: BARIATRICS | Facility: CLINIC | Age: 49
End: 2024-07-11

## 2024-07-11 VITALS — BODY MASS INDEX: 37.11 KG/M2 | WEIGHT: 258.6 LBS

## 2024-07-11 DIAGNOSIS — E66.9 OBESITY, CLASS II, BMI 35-39.9: Primary | ICD-10-CM

## 2024-07-11 PROCEDURE — RECHECK

## 2024-07-11 NOTE — PROGRESS NOTES
"Behavioral Health Follow Up Note:       5 / 6  Weight Check:  Surgeon: Dr. Heydi Gilbert  (sleeve)       Starting weight 256.7 #  Today's weight 258.6 #    Losing insurance end of September        Surgery month:  Aug/ Sept  Surgery deadline: December      Mental health and wellness - Patient is appropriately making changes based off the information contained in the bariatric manual.  Patient is modifying behaviors and demonstrating adapting to change.  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        Eating behaviors - pre planning has improved   30/60 rule  Meal replacement for shakes.  Increased fruit and veggies.  Still Taking vitamins  chewing food more     Activity -  going to [employer].  and still Walking with dog daily      Progress toward program requirements     Workflow:  Psych and/or D+A additional documentation  N/A  PCP Letter: 12/8/2023  Support Group: RECOMMENDED  Surgeon Appt.: 3/14/2024  EGD : done  Cardiac Risk Assessment: 7/3/2024  Sleep Studies: N/A   -- may have a new study done.  11/29/2024  Bloodwork: will obtain.        Goals:  continue following bariatric manual      "

## 2024-07-14 PROBLEM — Z00.00 ROUTINE ADULT HEALTH MAINTENANCE: Status: RESOLVED | Noted: 2024-06-14 | Resolved: 2024-07-14

## 2024-07-15 ENCOUNTER — VBI (OUTPATIENT)
Dept: ADMINISTRATIVE | Facility: OTHER | Age: 49
End: 2024-07-15

## 2024-07-15 NOTE — TELEPHONE ENCOUNTER
07/15/24 12:07 PM     Chart reviewed for Diabetic Eye Exam was/were submitted to the patient's insurance.     Juani Crowder MA   [facility]

## 2024-07-18 ENCOUNTER — TELEPHONE (OUTPATIENT)
Dept: BARIATRICS | Facility: CLINIC | Age: 49
End: 2024-07-18

## 2024-07-18 NOTE — TELEPHONE ENCOUNTER
Patient was called left a message his surgery was approved we will be awaiting a clearance from Hepatology and if cleared or if a biopsy needs to be taken during surgery we will get him scheduled.

## 2024-07-24 ENCOUNTER — OFFICE VISIT (OUTPATIENT)
Dept: GASTROENTEROLOGY | Facility: CLINIC | Age: 49
End: 2024-07-24
Payer: COMMERCIAL

## 2024-07-24 VITALS
BODY MASS INDEX: 37.8 KG/M2 | OXYGEN SATURATION: 99 % | HEIGHT: 70 IN | TEMPERATURE: 97 F | SYSTOLIC BLOOD PRESSURE: 120 MMHG | HEART RATE: 76 BPM | DIASTOLIC BLOOD PRESSURE: 80 MMHG | WEIGHT: 264 LBS

## 2024-07-24 DIAGNOSIS — E66.9 OBESITY, CLASS II, BMI 35-39.9: ICD-10-CM

## 2024-07-24 DIAGNOSIS — R74.8 ELEVATED LIVER ENZYMES: ICD-10-CM

## 2024-07-24 DIAGNOSIS — Z12.11 SCREENING FOR COLON CANCER: ICD-10-CM

## 2024-07-24 DIAGNOSIS — Z01.818 PRE-OPERATIVE CLEARANCE: ICD-10-CM

## 2024-07-24 DIAGNOSIS — K76.0 METABOLIC DYSFUNCTION-ASSOCIATED STEATOTIC LIVER DISEASE (MASLD): Primary | ICD-10-CM

## 2024-07-24 PROCEDURE — 99213 OFFICE O/P EST LOW 20 MIN: CPT | Performed by: FAMILY MEDICINE

## 2024-07-24 NOTE — PROGRESS NOTES
[facility] - Outpatient Consultation  Oswald Marvin 49 y.o. male MRN: [id number]  Encounter: [id number]          ASSESSMENT AND PLAN:      1. Metabolic dysfunction-associated steatotic liver disease (MASLD)  2. Obesity, Class II, BMI 35-39.9  3. Elevated liver enzymes  4. Pre-operative clearance  Patient with newly elevated serum transaminases since April 2024 with a peak serum ALT of 114. He was ordered for a chronic hepatitis panel which was negative as well as an RUQ US demonstrating hepatic steatosis. No clinical, serologic or radiographic evidence of chronic liver disease. He is also pursuing bariatric surgery and requires preoperative assessment.    Suspect that his elevated liver enzymes are secondary to MASLD although the degree of his elevations are slightly higher than is typical for fatty liver disease alone. Therefore, recommend a complete serologic evaluation to r/o competing causes of liver disease such as viral hepatitis, autoimmune hepatitis, A1AT deficiency, Wilsons disease and hemochromatosis. Also ordered serologies to check for immunity to hep A and B. He will also complete a US elastography to help quantify steatosis and better assess for advanced hepatic fibrosis.     Discussed the basic pathophysiology of fatty liver disease and potential to progress to cirrhosis over time if left untreated. Also discussed recommendations in regards to the treatment of fatty liver disease including steady and sustainable weight loss, optimization of his metabolic risk factors and limiting his alcohol consumption. He will continue following with bariatric surgery for pursuance of gastric sleeve. Also agree with the use a GLP-1a for both glycemic control and weight loss seeing as these medications are also beneficial for MASLD.      Will communicate if there is a need for an intraoperative liver biopsy, during the time of his bariatric surgery, with his bariatric  surgeon pending his lab and US elastography results.    NAFLD Fibrosis Score is: -1.394    NAFLD Score Correlated Fibrosis Severity   <-1.455 F0-F2   -1.455-0.676 Indeterminate Score   >0.676 F3-F4   **Fibrosis Severity Scale: F0 = no fibrosis; F1= mild fibrosis; F2 = moderate fibrosis; F3 = severe fibrosis; F4 = cirrhosis    - Ambulatory Referral to Hepatology  - Alpha 1 Antitrypsin Phenotype; Future  - Hepatic function panel; Future  - JIE Screen w/ Reflex to Titer/Pattern; Future  - Antimitochondrial antibody; Future  - Anti-smooth muscle antibody, IgG; Future  - IgG, IgA, IgM; Future  - Ceruloplasmin; Future  - Iron Panel (Includes Ferritin, Iron Sat%, Iron, and TIBC); Future  - Hepatitis A antibody, total; Future  - Hepatitis B surface antibody; Future  - US elastography/UGAP; Future    5. Screening for colon cancer  Patient is up-to-date with CRC screening. Colonoscopy (10/5/2023) notable for 5 subcentimeter polyps and small hemorrhoids. Recommended to repeat a colonoscopy x3 years due to personal history of colonic polyps.    Follow-up in 6 months.    ______________________________________________________________________    HPI: Patient is a 49 y.o. male with PMH significant for obesity, DM2, HLD, YURIY, ADHD, discoid lupus and anxiety with depression who presents today for a consultation regarding elevated liver enzymes.     Oswald is familiar to [facility] gastroenterology last seen by Alley Bush PA-C on 4/17/2024. He was noted to have newly elevated serum transaminases with ALT predominance on routine labs in February 2024. These remained elevated on repeat labs in June 2024 with a peak ALT of 114. He was ordered for chronic hepatitis panel which was negative. He also had an RUQ US notable for hepatic steatosis. Serologies without thrombocytopenia or chronic hypoalbuminemia.    Denies a personal history of chronic liver disease. He reports a history of cirrhosis including his mother. Believes her  cirrhosis was also related to fatty liver disease. Denies family history of liver related cancers. Denies being treated for any autoimmune conditions. Denies taking any herbal supplements, performance-enhancing drugs, or OTC/prescription medications not reflected on their med list. Denies EtOH use.     He is following with bariatric surgery, Dr. Eligio Adhikari, and is pursuing a gastric sleeve. He is also on Trulicity.       REVIEW OF SYSTEMS:    CONSTITUTIONAL: Denies any fever, chills, rigors, and weight loss.  HEENT: No earache or tinnitus. Denies hearing loss or visual disturbances.  CARDIOVASCULAR: No chest pain or palpitations.   RESPIRATORY: Denies any cough, hemoptysis, shortness of breath or dyspnea on exertion.  GASTROINTESTINAL: As noted in the History of Present Illness.   GENITOURINARY: No problems with urination. Denies any hematuria or dysuria.  NEUROLOGIC: No dizziness or vertigo, denies headaches.   MUSCULOSKELETAL: Denies any muscle or joint pain.   SKIN: Denies skin rashes or itching.   ENDOCRINE: Denies excessive thirst. Denies intolerance to heat or cold.  PSYCHOSOCIAL: Denies depression or anxiety. Denies any recent memory loss.       Historical Information   Past Medical History:   Diagnosis Date   • Colon polyp    • Diabetes (HCC)    • Diabetes mellitus (HCC)    • GERD (gastroesophageal reflux disease)    • Hyperlipidemia    • YURIY on CPAP      Past Surgical History:   Procedure Laterality Date   • COLONOSCOPY     • RI LAPS ABD PRTM&OMENTUM DX W/WO SPEC BR/WA SPX N/A 08/16/2022    Procedure: LAPAROSCOPY DIAGNOSTIC, LYSIS OF ADHESIONS, REDUCTION OF INTERNAL HERNIA;  Surgeon: Raymond Núñez MD;  Location: [facility] OR;  Service: General   • VASECTOMY       Social History   Social History     Substance and Sexual Activity   Alcohol Use Never     Social History     Substance and Sexual Activity   Drug Use Never     Social History     Tobacco Use   Smoking Status Former   • Current packs/day: 0.00   •  "Average packs/day: 3.0 packs/day for 15.0 years (45.0 ttl pk-yrs)   • Types: Cigarettes   • Quit date: 3/16/2012   • Years since quittin.3   • Passive exposure: Never   Smokeless Tobacco Never   Tobacco Comments    8 YRS AGO     Family History   Problem Relation Age of Onset   • Liver disease Mother    • Emphysema Father        Meds/Allergies       Current Outpatient Medications:   •  atorvastatin (LIPITOR) 20 mg tablet  •  [START ON 8/15/2024] atovaquone-proguanil (MALARONE) 250-100 mg  •  Blood Glucose Monitoring Suppl (OneTouch Verio Reflect) w/Device KIT  •  dulaglutide (Trulicity) 0.75 MG/0.5ML injection  •  glucose blood (OneTouch Verio) test strip  •  metFORMIN (GLUCOPHAGE) 1000 MG tablet  •  omeprazole (PriLOSEC) 40 MG capsule  •  OneTouch Delica Lancets 33G MISC  •  venlafaxine (EFFEXOR-XR) 75 mg 24 hr capsule    No Known Allergies        Objective     Blood pressure 120/80, pulse 76, temperature (!) 97 °F (36.1 °C), temperature source Tympanic, height 5' 10\" (1.778 m), weight 120 kg (264 lb), SpO2 99%. Body mass index is 37.88 kg/m².        PHYSICAL EXAM:      General Appearance:   Alert, cooperative, no distress   HEENT:   Normocephalic, atraumatic, anicteric.     Neck:  Supple, symmetrical, trachea midline   Lungs:   Clear to auscultation bilaterally; no rales, rhonchi or wheezing; respirations unlabored    Heart::   Regular rate and rhythm; no murmur, rub, or gallop.   Abdomen:   Soft, non-tender, non-distended; normal bowel sounds; no masses, no organomegaly    Genitalia:   Deferred    Rectal:   Deferred    Extremities:  No cyanosis, clubbing or edema    Pulses:  2+ and symmetric    Skin:  No jaundice, rashes, or lesions    Lymph nodes:  No palpable cervical lymphadenopathy        Lab Results:   No visits with results within 1 Day(s) from this visit.   Latest known visit with results is:   Telephone on 2024   Component Date Value   • Supplier Name 2024 [employer] - " [employer]    • Supplier Phone Number 07/09/2024 [phone]    • Order Status 07/09/2024 Pending Authorization    • Delivery Note 07/09/2024 Established patient of Eliz who currently uses a CPAP. He has [facility] which requires our office to first fax Rx and clinicals to [facility].  Rx and clinicals were faxed on 6/28/24 and again today.    • Delivery Request Date 07/09/2024 07/09/2024    • Item Description 07/09/2024 BiLevel Machine, Resmed S10 Aircurve Auto BiLevel    • Item Description 07/09/2024 PAP Mask, Full Face, Fit Upon Setup, N/A, 1 per 3 months    • Item Description 07/09/2024 PAP Mask Interface Cushion, Full Face, 1 per 1 month    • Item Description 07/09/2024 PAP Headgear, 1 per 6 months    • Item Description 07/09/2024 PAP Humidifier, Heated    • Item Description 07/09/2024 Disposable PAP Filter, 2 per 1 month    • Item Description 07/09/2024 Non-Disposable PAP Filter, 1 per 6 months    • Item Description 07/09/2024 PAP Machine Tubing, Heated, 1 per 3 months    • Item Description 07/09/2024 PAP Monitoring Modem    • Item Description 07/09/2024 PAP Chinstrap, 1 per 6 months          Radiology Results:   No results found.    Sneha Portillo PA-C     **Please note: Dictation voice to text software may have been used in the creation of this record. Occasional wrong word or “sound alike” substitutions may have occurred due to the inherent limitations of voice recognition software. Read the chart carefully and recognize, using context, where substitutions have occurred.**

## 2024-07-25 ENCOUNTER — HOSPITAL ENCOUNTER (OUTPATIENT)
Dept: RADIOLOGY | Facility: HOSPITAL | Age: 49
Discharge: HOME | End: 2024-07-25
Payer: COMMERCIAL

## 2024-07-25 ENCOUNTER — APPOINTMENT (OUTPATIENT)
Dept: LAB | Facility: CLINIC | Age: 49
End: 2024-07-25
Payer: COMMERCIAL

## 2024-07-25 DIAGNOSIS — K76.0 METABOLIC DYSFUNCTION-ASSOCIATED STEATOTIC LIVER DISEASE (MASLD): ICD-10-CM

## 2024-07-25 DIAGNOSIS — Z01.818 PRE-OPERATIVE CLEARANCE: ICD-10-CM

## 2024-07-25 DIAGNOSIS — R74.8 ELEVATED LIVER ENZYMES: ICD-10-CM

## 2024-07-25 LAB
ALBUMIN SERPL BCG-MCNC: 4.1 G/DL (ref 3.5–5)
ALP SERPL-CCNC: 100 U/L (ref 34–104)
ALT SERPL W P-5'-P-CCNC: 98 U/L (ref 7–52)
ANA SER QL IA: NEGATIVE
AST SERPL W P-5'-P-CCNC: 58 U/L (ref 13–39)
BILIRUB DIRECT SERPL-MCNC: 0.14 MG/DL (ref 0–0.2)
BILIRUB SERPL-MCNC: 0.32 MG/DL (ref 0.2–1)
DME PARACHUTE DELIVERY DATE EXPECTED: NORMAL
DME PARACHUTE DELIVERY DATE REQUESTED: NORMAL
DME PARACHUTE DELIVERY NOTE: NORMAL
DME PARACHUTE ITEM DESCRIPTION: NORMAL
DME PARACHUTE ORDER STATUS: NORMAL
DME PARACHUTE SUPPLIER NAME: NORMAL
DME PARACHUTE SUPPLIER PHONE: NORMAL
FERRITIN SERPL-MCNC: 176 NG/ML (ref 24–336)
HAV AB SER QL IA: REACTIVE
HBV SURFACE AB SER-ACNC: 63.7 MIU/ML
IGA SERPL-MCNC: 304 MG/DL (ref 66–433)
IGG SERPL-MCNC: 1208 MG/DL (ref 635–1741)
IGM SERPL-MCNC: 97 MG/DL (ref 45–281)
IRON SATN MFR SERPL: 22 % (ref 15–50)
IRON SERPL-MCNC: 72 UG/DL (ref 50–212)
PROT SERPL-MCNC: 7.6 G/DL (ref 6.4–8.4)
TIBC SERPL-MCNC: 323 UG/DL (ref 250–450)
UIBC SERPL-MCNC: 251 UG/DL (ref 155–355)

## 2024-07-25 PROCEDURE — 82728 ASSAY OF FERRITIN: CPT

## 2024-07-25 PROCEDURE — 86038 ANTINUCLEAR ANTIBODIES: CPT

## 2024-07-25 PROCEDURE — 86381 MITOCHONDRIAL ANTIBODY EACH: CPT

## 2024-07-25 PROCEDURE — 86708 HEPATITIS A ANTIBODY: CPT

## 2024-07-25 PROCEDURE — 86706 HEP B SURFACE ANTIBODY: CPT

## 2024-07-25 PROCEDURE — 82104 ALPHA-1-ANTITRYPSIN PHENO: CPT

## 2024-07-25 PROCEDURE — 76981 USE PARENCHYMA: CPT

## 2024-07-25 PROCEDURE — 82784 ASSAY IGA/IGD/IGG/IGM EACH: CPT

## 2024-07-25 PROCEDURE — 83550 IRON BINDING TEST: CPT

## 2024-07-25 PROCEDURE — 86015 ACTIN ANTIBODY EACH: CPT

## 2024-07-25 PROCEDURE — 80076 HEPATIC FUNCTION PANEL: CPT

## 2024-07-25 PROCEDURE — 82103 ALPHA-1-ANTITRYPSIN TOTAL: CPT

## 2024-07-25 PROCEDURE — 83540 ASSAY OF IRON: CPT

## 2024-07-25 PROCEDURE — 82390 ASSAY OF CERULOPLASMIN: CPT

## 2024-07-25 PROCEDURE — 36415 COLL VENOUS BLD VENIPUNCTURE: CPT

## 2024-07-26 ENCOUNTER — TELEPHONE (OUTPATIENT)
Dept: BARIATRICS | Facility: CLINIC | Age: 49
End: 2024-07-26

## 2024-07-26 ENCOUNTER — TELEPHONE (OUTPATIENT)
Age: 49
End: 2024-07-26

## 2024-07-26 LAB
ACTIN IGG SERPL-ACNC: 5 UNITS (ref 0–19)
CERULOPLASMIN SERPL-MCNC: 27 MG/DL (ref 16–31)
MITOCHONDRIA M2 IGG SER-ACNC: <20 UNITS (ref 0–20)

## 2024-07-26 NOTE — TELEPHONE ENCOUNTER
Patients GI provider:  Bandar HINOJOSA    Number to return call: [phone]    Reason for call: Katina Hoff from [facility] called to request Sneha take a look at pt's US from yesterday and send he a message directly to let her know if the pt will need a liver biopsy. She states pt is going to lose his insurance so they would like to get his surgery scheduled as soon as possible.    Scheduled procedure/appointment date if applicable: Appt 10/22/24

## 2024-07-26 NOTE — TELEPHONE ENCOUNTER
I called GI spoke to Patria [phone] requesting something stating if patient will need a liver biopsy during his bariatric surgery. He is losing his insurance and will be a rush case he does not have a date until he is cleared and if he will need biopsy is determined.

## 2024-07-29 ENCOUNTER — TELEPHONE (OUTPATIENT)
Age: 49
End: 2024-07-29

## 2024-07-29 LAB
A1AT PHENOTYP SERPL IFE: NORMAL
A1AT SERPL-MCNC: 139 MG/DL (ref 101–187)

## 2024-07-29 NOTE — TELEPHONE ENCOUNTER
Contacted patient off of [facility]  to verify needs of services in attempts to offer patient an appointment at Available Office. Writer verified Full Name, , and Callback Number. Writer spoke with patient who stated he was interested but was not sure if he should schedule based on insurance situation. Writer explained pt's options.  Pt agreed to be removed from wait list.    1st call attempt

## 2024-07-31 ENCOUNTER — TELEPHONE (OUTPATIENT)
Dept: BARIATRICS | Facility: CLINIC | Age: 49
End: 2024-07-31

## 2024-07-31 ENCOUNTER — PREP FOR PROCEDURE (OUTPATIENT)
Dept: BARIATRICS | Facility: CLINIC | Age: 49
End: 2024-07-31

## 2024-07-31 DIAGNOSIS — G47.33 OSA ON CPAP: ICD-10-CM

## 2024-07-31 DIAGNOSIS — E66.9 OBESITY, UNSPECIFIED: Primary | ICD-10-CM

## 2024-07-31 DIAGNOSIS — E11.9 DIABETES MELLITUS (HCC): ICD-10-CM

## 2024-07-31 DIAGNOSIS — K21.9 ESOPHAGEAL REFLUX: ICD-10-CM

## 2024-07-31 DIAGNOSIS — E78.5 HYPERLIPEMIA: ICD-10-CM

## 2024-07-31 NOTE — TELEPHONE ENCOUNTER
I called left a message to contact me about scheduling a surgery date. on the message I stated he must contact his PCP to advise him about when to stop his Trulicity he will be doing a low calorie diet for 2 weeks prior to surgery date.

## 2024-08-01 ENCOUNTER — CLINICAL SUPPORT (OUTPATIENT)
Dept: BARIATRICS | Facility: CLINIC | Age: 49
End: 2024-08-01

## 2024-08-01 ENCOUNTER — TELEPHONE (OUTPATIENT)
Dept: SLEEP CENTER | Facility: CLINIC | Age: 49
End: 2024-08-01

## 2024-08-01 ENCOUNTER — OFFICE VISIT (OUTPATIENT)
Dept: BARIATRICS | Facility: CLINIC | Age: 49
End: 2024-08-01
Payer: COMMERCIAL

## 2024-08-01 VITALS
WEIGHT: 262.5 LBS | TEMPERATURE: 97.6 F | HEIGHT: 69 IN | OXYGEN SATURATION: 99 % | HEART RATE: 74 BPM | SYSTOLIC BLOOD PRESSURE: 124 MMHG | BODY MASS INDEX: 38.88 KG/M2 | DIASTOLIC BLOOD PRESSURE: 80 MMHG

## 2024-08-01 DIAGNOSIS — K22.70 BARRETT ESOPHAGUS: ICD-10-CM

## 2024-08-01 DIAGNOSIS — E66.9 OBESITY, CLASS II, BMI 35-39.9: Primary | ICD-10-CM

## 2024-08-01 PROCEDURE — RECHECK: Performed by: DIETITIAN, REGISTERED

## 2024-08-01 PROCEDURE — 99213 OFFICE O/P EST LOW 20 MIN: CPT | Performed by: SURGERY

## 2024-08-01 RX ORDER — HEPARIN SODIUM 5000 [USP'U]/ML
5000 INJECTION, SOLUTION INTRAVENOUS; SUBCUTANEOUS ONCE
OUTPATIENT
Start: 2024-08-01 | End: 2024-08-01

## 2024-08-01 RX ORDER — CEFAZOLIN SODIUM 2 G/50ML
2000 SOLUTION INTRAVENOUS ONCE
OUTPATIENT
Start: 2024-08-01 | End: 2024-08-01

## 2024-08-01 RX ORDER — CELECOXIB 200 MG/1
200 CAPSULE ORAL ONCE
OUTPATIENT
Start: 2024-08-01 | End: 2024-08-01

## 2024-08-01 RX ORDER — ACETAMINOPHEN 10 MG/ML
1000 INJECTION, SOLUTION INTRAVENOUS ONCE
OUTPATIENT
Start: 2024-08-01 | End: 2024-08-01

## 2024-08-01 NOTE — PROGRESS NOTES
Date of surgery: 8/12/24  Procedure: SLEEVE  Performing surgeon: DR. MARIE    Initial Weight - 257.5 lb  Current Weight - 262.5  Total Body Weight Loss (EWL)- -5.7  EWL% - -7  TWB % - -2

## 2024-08-01 NOTE — PRE-PROCEDURE INSTRUCTIONS
Pre-Surgery Instructions:   Medication Instructions    atorvastatin (LIPITOR) 20 mg tablet Take day of surgery.    metFORMIN (GLUCOPHAGE) 1000 MG tablet Hold day of surgery.    omeprazole (PriLOSEC) 40 MG capsule Take day of surgery.    venlafaxine (EFFEXOR-XR) 75 mg 24 hr capsule Take day of surgery.          Medication instructions for day surgery reviewed. Please use only a sip of water to take your instructed medications. Avoid all over the counter vitamins, supplements and NSAIDS for one week prior to surgery per anesthesia guidelines. Tylenol is ok to take as needed.     You will receive a call one business day prior to surgery with an arrival time and hospital directions. If your surgery is scheduled on a Monday, the hospital will be calling you on the Friday prior to your surgery. If you have not heard from anyone by 8pm, please call the hospital supervisor through the hospital  at [phone]. ([place] [phone] or [place] [phone]).    Do not eat or drink anything after midnight the night before your surgery, including candy, mints, lifesavers, or chewing gum. Do not drink alcohol 24hrs before your surgery. Try not to smoke at least 24hrs before your surgery.       Follow the pre surgery showering instructions as listed in the “My Surgical Experience Booklet” or otherwise provided by your surgeon's office. Do not use a blade to shave the surgical area 1 week before surgery. It is okay to use a clean electric clippers up to 24 hours before surgery. Do not apply any lotions, creams, including makeup, cologne, deodorant, or perfumes after showering on the day of your surgery. Do not use dry shampoo, hair spray, hair gel, or any type of hair products.     No contact lenses, eye make-up, or artificial eyelashes. Remove nail polish, including gel polish, and any artificial, gel, or acrylic nails if possible. Remove all jewelry including rings and body piercing jewelry.     Wear causal clothing  that is easy to take on and off. Consider your type of surgery.    Keep any valuables, jewelry, piercings at home. Please bring any specially ordered equipment (sling, braces) if indicated.    Arrange for a responsible person to drive you to and from the hospital on the day of your surgery. Please confirm the visitor policy for the day of your procedure when you receive your phone call with an arrival time.     Call the surgeon's office with any new illnesses, exposures, or additional questions prior to surgery.    Please reference your “My Surgical Experience Booklet” for additional information to prepare for your upcoming surgery.

## 2024-08-01 NOTE — TELEPHONE ENCOUNTER
Incoming call from patient via Claudette in access center.      Patient has concerns about really needing Bipap machine.   --I am scheduled to  Bipap on 8/5/2024 in [place] office.   --My [employer] insurance is running out in September and I don't want to be financially responsible for the amount they would be paiying.   --Scheduled for bariatric surgery on 8/12/2024 and I will be losing weight and have been told I might not have sleep apnea.     Patient currently using CPAP.  Informed patient best thing to do is speak with Dr. Simpson.     Patient scheduled for compliance appointment tomorrow with Dr. Simpson at 1:15pm in [place] office.     Email to Evie Gilbert to inform of add on patient.     Teams message to Mannie at [facility] to inform of add on appointment.

## 2024-08-01 NOTE — H&P (VIEW-ONLY)
BARIATRIC HISTORY AND PHYSICAL - BARIATRIC SURGERY  Oswald Marvin 49 y.o. male MRN: [id number]  Unit/Bed#:  Encounter: [id number]      HPI:  Oswald Marvin is a 49 y.o. male who presents to review their preoperative workup and see if they are a good candidate to undergo a bariatric procedure.     Review of Systems   Constitutional:  Negative for chills and fever.   HENT:  Negative for ear pain and sore throat.    Eyes:  Negative for pain and visual disturbance.   Respiratory:  Negative for cough and shortness of breath.    Cardiovascular:  Negative for chest pain and palpitations.   Gastrointestinal:  Negative for abdominal pain and vomiting.   Genitourinary:  Negative for dysuria and hematuria.   Musculoskeletal:  Negative for arthralgias and back pain.   Skin:  Negative for color change and rash.   Neurological:  Negative for seizures and syncope.   All other systems reviewed and are negative.      Historical Information   Past Medical History:   Diagnosis Date    Colon polyp     Diabetes (HCC)     Diabetes mellitus (HCC)     GERD (gastroesophageal reflux disease)     Hyperlipidemia     YURIY on CPAP      Past Surgical History:   Procedure Laterality Date    COLONOSCOPY      [place] LAPS ABD PRTM&OMENTUM DX W/WO SPEC BR/WA SPX N/A 2022    Procedure: LAPAROSCOPY DIAGNOSTIC, LYSIS OF ADHESIONS, REDUCTION OF INTERNAL HERNIA;  Surgeon: Raymond Núñez MD;  Location: [facility];  Service: General    VASECTOMY       Social History   Social History     Substance and Sexual Activity   Alcohol Use Never     Social History     Substance and Sexual Activity   Drug Use Never     Social History     Tobacco Use   Smoking Status Former    Current packs/day: 0.00    Average packs/day: 3.0 packs/day for 15.0 years (45.0 ttl pk-yrs)    Types: Cigarettes    Quit date: 3/16/2012    Years since quittin.3    Passive exposure: Never   Smokeless Tobacco Never   Tobacco Comments    8 YRS AGO     Family History:  "non-contributory    Meds/Allergies   all medications and allergies reviewed  No Known Allergies    Objective     Current Vitals:   Blood Pressure: 124/80 (08/01/24 1417)  Pulse: 74 (08/01/24 1417)  Temperature: 97.6 °F (36.4 °C) (08/01/24 1417)  Height: 5' 9.2\" (175.8 cm) (08/01/24 1417)  Weight - Scale: 119 kg (262 lb 8 oz) (08/01/24 1417)  SpO2: 99 % (08/01/24 1417)  bowel movement  [unfilled]    Invasive Devices       None                   Physical Exam  Constitutional:       Appearance: Normal appearance. He is obese.   HENT:      Head: Normocephalic and atraumatic.      Nose: Nose normal.      Mouth/Throat:      Mouth: Mucous membranes are moist.   Eyes:      Extraocular Movements: Extraocular movements intact.      Pupils: Pupils are equal, round, and reactive to light.   Cardiovascular:      Rate and Rhythm: Normal rate and regular rhythm.      Pulses: Normal pulses.      Heart sounds: Normal heart sounds.   Pulmonary:      Effort: Pulmonary effort is normal.      Breath sounds: Normal breath sounds.   Abdominal:      Palpations: Abdomen is soft.      Comments: No rashes   Musculoskeletal:      Cervical back: Normal range of motion.   Skin:     General: Skin is warm.   Neurological:      General: No focal deficit present.      Mental Status: He is alert and oriented to person, place, and time.   Psychiatric:         Mood and Affect: Mood normal.         Behavior: Behavior normal.         Lab Results: I have personally reviewed pertinent lab results.    Imaging: I have personally reviewed pertinent reports.    EKG, Pathology, and Other Studies: I have personally reviewed pertinent reports.      [facility]  [address] [facility] [address] [phone]        DATE OF SERVICE:  10/05/23     PHYSICIAN(S):  Attending:   Diana M Jaiyeola, MD      Fellow:   No Staff Documented         INDICATION:  Gastroesophageal reflux disease with esophagitis without " hemorrhage     POST-OP DIAGNOSIS:  See the impression below.     PREPROCEDURE:  Informed consent was obtained for the procedure, including sedation.  Risks of perforation, hemorrhage, adverse drug reaction and aspiration were discussed. The patient was placed in the left lateral decubitus position.     Patient was explained about the risks and benefits of the procedure. Risks including but not limited to bleeding, infection, and perforation were explained in detail. Also explained about less than 100% sensitivity with the exam and other alternatives.     PROCEDURE: EGD     DETAILS OF PROCEDURE:   Patient was taken to the procedure room where a time out was performed to confirm correct patient and correct procedure. The patient underwent monitored anesthesia care, which was administered by an anesthesia professional. The patient's blood pressure, heart rate, level of consciousness, respirations and oxygen were monitored throughout the procedure. The scope was advanced to the second part of the duodenum. Retroflexion was performed in the fundus. The patient experienced no blood loss. The procedure was not difficult. The patient tolerated the procedure well. There were no apparent adverse events.      ANESTHESIA INFORMATION:  ASA: II  Anesthesia Type: Anesthesia type not filed in the log.     MEDICATIONS:  No administrations occurring from 0727 to 0744 on 10/05/23         FINDINGS:  C0M1 Valdez's esophagus observed with 2 tongues and an associated lesion without using a distal attachment cap. The diaphragmatic impression was 41 cm from the incisors, Z-line was 36 cm from the incisors and the top of gastric folds was 36 cm from the incisors. 0 islands were noted; performed cold forceps biopsy  Single small, mucosal nodule was observed in the GE junction  5 cm hiatal hernia - GE junction 36 cm from the incisors, diaphragmatic impression 41 cm from the incisors:  Hill classification: Grade IV  Mild, generalized  erythematous mucosa in the stomach; performed cold forceps biopsy  The duodenum appeared normal.        SPECIMENS:  ID Type Source Tests Collected by Time Destination   1 : gastric bx r/o h pylori Tissue Stomach TISSUE EXAM Diana M Jaiyeola, MD 10/5/2023  7:36 AM     2 : GE junction r/o barretts bx at 35 cm Tissue Esophagogastric junction TISSUE EXAM Diana M Jaiyeola, MD 10/5/2023  7:38 AM     3 : nodule bx GE junction Tissue Esophagogastric junction TISSUE EXAM Diana M Jaiyeola, MD 10/5/2023  7:40 AM              IMPRESSION:  C0M1 Valdez's esophagus with 2 tongues of salmon-colored mucosa extending 1 cm above the Z-line.  One of the tongues had a inflammatory appearing nodule at 35 cm which was biopsied.    Single small, mucosal nodule was observed in the GE junction  5 cm hiatal hernia  Mild erythematous mucosa in the stomach; performed cold forceps biopsy  The duodenum appeared normal.        RECOMMENDATION:    Await pathology results     Schedule repeat EGD     Valdez's esophagus surveillance         Pending results of biopsy repeat EGD in 1 years given Valdez's esophagus and nodule visualized on this exam.  Continue daily PPI  Proceed with colonoscopy        Diana M Jaiyeola, MD     Final Diagnosis   A.  Stomach, biopsy:  -Benign gastric antral and oxyntic mucosa with mild chronic inflammation and reactive change.  -Negative for Helicobacter pylori, by H&E stain.  -Negative for atrophy, intestinal metaplasia, dysplasia or carcinoma.     B.  Gastroesophageal junction, 35 cm, biopsy:  -Benign gastroesophageal junction mucosa with chronic inflammation and reactive change.  -Negative for intestinal metaplasia (Valdez's esophagus), dysplasia or carcinoma.     C.  Gastroesophageal junction, nodule, biopsy:  -Gastroesophageal junction mucosa with intestinal metaplasia, compatible with Valdez's esophagus in the correct clinical setting.  -Negative for dysplasia or carcinoma.    -Alcian blue/PAS stain highlights  intestinal type mucin, supporting the diagnosis.     D.  Colon, sigmoid polyp, biopsy:  -Hyperplastic polyp.     E.  Colon, cecal polyps, biopsies:  -Portions of serrated polyps compatible with sessile serrated adenomas.     F.  Colon, ascending polyp, biopsy:  -Portion of serrated polyp compatible with sessile or serrated adenoma.       Code Status: [unfilled]  Advance Directive and Living Will:      Power of :    POLST:        Assessment/Plan:    The patient presented to review the preoperative workup and see if bariatric surgery is appropriate and indicated following the extensive preoperative workup and the enrollment in our weight loss program.  Preoperative workup was complete. Results were reviewed with the patient including the blood work results and the endoscopy findings and the biopsy results. We also reviewed the cardiology evaluation.    The patient was determined to be a good candidate for Laparoscopic RNYGB with robotic assist (changed from plan of sleeve gastrectomy)    Hx barretts esophagus, positive on tissue exam. We discussed repeating EGD with biopsies before the procedure and proceed with Sleeve Gastrectomy or instead doing RNYGB which was recommended to be the better procedure for his situation. Patient is agreeable to RNYGB.  We will submit to insurance for RNYGB due to Barretts esophagus on tissue exam.     5cm HH on EGD  Hx diag lap for internal hernia caused by adhesive band 2022  Hx Hep A, patient received call from hepatology with clearance for surgery without biopsy needed    Hepatology message: Called and spoke with patient regarding his labs and US elastography results.     Patient's labs show persistently elevated serum transaminases although slightly improved when compared to prior.  His serologic evaluation was normal/negative for viral hepatitis as well as autoimmune and other genetic causes of liver disease.  A1 AT phenotype in process.     Patient's US elastography  was notable for F0-F1 fibrosis and S3 steatosis.     Discussed that his elevated liver enzymes are likely secondary to fatty liver disease and would recommend trending his liver tests with weight loss. Therefore, it is not necessary that he have a liver biopsy during the time of his bariatric surgery and will communicate this with his surgeon, Dr. Eligio Gilbert.       Provider was explaining this when the call was dropped. Provider tried calling patient back but call went straight to voicemail. M with the remaining information and to call back with any questions/concerns.  ----------------------------------------------------------------------  Smoking: Quit years ago  Blood thinner use: Denies  Home pain medication use and who manages it: Denies  CPAP use: Yes(If yes, sleep study obtained and reminded pt to bring CPAP to hospital)  History of blood clots: Denies  Previous abdominal surgeries: Internal hernia from adhesions   Cardiac clearance obtained: Yes   EKG performed: Yes  EGD prior to surgery: Yes  Screening Labs obtained (CBC, CMP): Yes  H. Pylori status: Negative  Medication allergies: NKDA  SLIM consult Post-Op: Yes  Reminded patient about 2 week pre-op liquid diet: Yes  10% body weight loss pre-operatively met/discussed: Yes  Consent signed: Yes  Pre-Op ERAS Orders placed: DONE for RNYGB  -----------------------------------------------------------------------  Risks and benefits explained one more time to the patient. Alternatives to surgery and alternative forms of surgery were also explained. Post-surgical commitment and after care programs were explained.  Consent was signed. Questions were answered and concerns were addressed. Patient will need to start the 2 week liquid diet prior to surgery.  Patient wishes to proceed. As per [facility] guidelines, I had a discussion with the patient regarding their CODE STATUS in the perioperative period and the patient is level 1 or FULL CODE  STATUS.    Joana Pham PA-C  Bariatric Surgery   8/1/2024  2:28 PM

## 2024-08-01 NOTE — PROGRESS NOTES
BARIATRIC HISTORY AND PHYSICAL - BARIATRIC SURGERY  Oswald Marvin 49 y.o. male MRN: [id number]  Unit/Bed#:  Encounter: [id number]      HPI:  Oswald Marvin is a 49 y.o. male who presents to review their preoperative workup and see if they are a good candidate to undergo a bariatric procedure.     Review of Systems   Constitutional:  Negative for chills and fever.   HENT:  Negative for ear pain and sore throat.    Eyes:  Negative for pain and visual disturbance.   Respiratory:  Negative for cough and shortness of breath.    Cardiovascular:  Negative for chest pain and palpitations.   Gastrointestinal:  Negative for abdominal pain and vomiting.   Genitourinary:  Negative for dysuria and hematuria.   Musculoskeletal:  Negative for arthralgias and back pain.   Skin:  Negative for color change and rash.   Neurological:  Negative for seizures and syncope.   All other systems reviewed and are negative.      Historical Information   Past Medical History:   Diagnosis Date    Colon polyp     Diabetes (HCC)     Diabetes mellitus (HCC)     GERD (gastroesophageal reflux disease)     Hyperlipidemia     YURIY on CPAP      Past Surgical History:   Procedure Laterality Date    COLONOSCOPY      [place] LAPS ABD PRTM&OMENTUM DX W/WO SPEC BR/WA SPX N/A 2022    Procedure: LAPAROSCOPY DIAGNOSTIC, LYSIS OF ADHESIONS, REDUCTION OF INTERNAL HERNIA;  Surgeon: Raymond Núñez MD;  Location: [facility];  Service: General    VASECTOMY       Social History   Social History     Substance and Sexual Activity   Alcohol Use Never     Social History     Substance and Sexual Activity   Drug Use Never     Social History     Tobacco Use   Smoking Status Former    Current packs/day: 0.00    Average packs/day: 3.0 packs/day for 15.0 years (45.0 ttl pk-yrs)    Types: Cigarettes    Quit date: 3/16/2012    Years since quittin.3    Passive exposure: Never   Smokeless Tobacco Never   Tobacco Comments    8 YRS AGO     Family History:  "non-contributory    Meds/Allergies   all medications and allergies reviewed  No Known Allergies    Objective     Current Vitals:   Blood Pressure: 124/80 (08/01/24 1417)  Pulse: 74 (08/01/24 1417)  Temperature: 97.6 °F (36.4 °C) (08/01/24 1417)  Height: 5' 9.2\" (175.8 cm) (08/01/24 1417)  Weight - Scale: 119 kg (262 lb 8 oz) (08/01/24 1417)  SpO2: 99 % (08/01/24 1417)  bowel movement  [unfilled]    Invasive Devices       None                   Physical Exam  Constitutional:       Appearance: Normal appearance. He is obese.   HENT:      Head: Normocephalic and atraumatic.      Nose: Nose normal.      Mouth/Throat:      Mouth: Mucous membranes are moist.   Eyes:      Extraocular Movements: Extraocular movements intact.      Pupils: Pupils are equal, round, and reactive to light.   Cardiovascular:      Rate and Rhythm: Normal rate and regular rhythm.      Pulses: Normal pulses.      Heart sounds: Normal heart sounds.   Pulmonary:      Effort: Pulmonary effort is normal.      Breath sounds: Normal breath sounds.   Abdominal:      Palpations: Abdomen is soft.      Comments: No rashes   Musculoskeletal:      Cervical back: Normal range of motion.   Skin:     General: Skin is warm.   Neurological:      General: No focal deficit present.      Mental Status: He is alert and oriented to person, place, and time.   Psychiatric:         Mood and Affect: Mood normal.         Behavior: Behavior normal.         Lab Results: I have personally reviewed pertinent lab results.    Imaging: I have personally reviewed pertinent reports.    EKG, Pathology, and Other Studies: I have personally reviewed pertinent reports.      [facility]  [address] [facility] [address] [phone]        DATE OF SERVICE:  10/05/23     PHYSICIAN(S):  Attending:   Diana M Jaiyeola, MD      Fellow:   No Staff Documented         INDICATION:  Gastroesophageal reflux disease with esophagitis without " hemorrhage     POST-OP DIAGNOSIS:  See the impression below.     PREPROCEDURE:  Informed consent was obtained for the procedure, including sedation.  Risks of perforation, hemorrhage, adverse drug reaction and aspiration were discussed. The patient was placed in the left lateral decubitus position.     Patient was explained about the risks and benefits of the procedure. Risks including but not limited to bleeding, infection, and perforation were explained in detail. Also explained about less than 100% sensitivity with the exam and other alternatives.     PROCEDURE: EGD     DETAILS OF PROCEDURE:   Patient was taken to the procedure room where a time out was performed to confirm correct patient and correct procedure. The patient underwent monitored anesthesia care, which was administered by an anesthesia professional. The patient's blood pressure, heart rate, level of consciousness, respirations and oxygen were monitored throughout the procedure. The scope was advanced to the second part of the duodenum. Retroflexion was performed in the fundus. The patient experienced no blood loss. The procedure was not difficult. The patient tolerated the procedure well. There were no apparent adverse events.      ANESTHESIA INFORMATION:  ASA: II  Anesthesia Type: Anesthesia type not filed in the log.     MEDICATIONS:  No administrations occurring from 0727 to 0744 on 10/05/23         FINDINGS:  C0M1 Valdez's esophagus observed with 2 tongues and an associated lesion without using a distal attachment cap. The diaphragmatic impression was 41 cm from the incisors, Z-line was 36 cm from the incisors and the top of gastric folds was 36 cm from the incisors. 0 islands were noted; performed cold forceps biopsy  Single small, mucosal nodule was observed in the GE junction  5 cm hiatal hernia - GE junction 36 cm from the incisors, diaphragmatic impression 41 cm from the incisors:  Hill classification: Grade IV  Mild, generalized  erythematous mucosa in the stomach; performed cold forceps biopsy  The duodenum appeared normal.        SPECIMENS:  ID Type Source Tests Collected by Time Destination   1 : gastric bx r/o h pylori Tissue Stomach TISSUE EXAM Diana M Jaiyeola, MD 10/5/2023  7:36 AM     2 : GE junction r/o barretts bx at 35 cm Tissue Esophagogastric junction TISSUE EXAM Diana M Jaiyeola, MD 10/5/2023  7:38 AM     3 : nodule bx GE junction Tissue Esophagogastric junction TISSUE EXAM Diana M Jaiyeola, MD 10/5/2023  7:40 AM              IMPRESSION:  C0M1 Valdez's esophagus with 2 tongues of salmon-colored mucosa extending 1 cm above the Z-line.  One of the tongues had a inflammatory appearing nodule at 35 cm which was biopsied.    Single small, mucosal nodule was observed in the GE junction  5 cm hiatal hernia  Mild erythematous mucosa in the stomach; performed cold forceps biopsy  The duodenum appeared normal.        RECOMMENDATION:    Await pathology results     Schedule repeat EGD     Valdez's esophagus surveillance         Pending results of biopsy repeat EGD in 1 years given Valdez's esophagus and nodule visualized on this exam.  Continue daily PPI  Proceed with colonoscopy        Diana M Jaiyeola, MD     Final Diagnosis   A.  Stomach, biopsy:  -Benign gastric antral and oxyntic mucosa with mild chronic inflammation and reactive change.  -Negative for Helicobacter pylori, by H&E stain.  -Negative for atrophy, intestinal metaplasia, dysplasia or carcinoma.     B.  Gastroesophageal junction, 35 cm, biopsy:  -Benign gastroesophageal junction mucosa with chronic inflammation and reactive change.  -Negative for intestinal metaplasia (Valdez's esophagus), dysplasia or carcinoma.     C.  Gastroesophageal junction, nodule, biopsy:  -Gastroesophageal junction mucosa with intestinal metaplasia, compatible with Valdez's esophagus in the correct clinical setting.  -Negative for dysplasia or carcinoma.    -Alcian blue/PAS stain highlights  intestinal type mucin, supporting the diagnosis.     D.  Colon, sigmoid polyp, biopsy:  -Hyperplastic polyp.     E.  Colon, cecal polyps, biopsies:  -Portions of serrated polyps compatible with sessile serrated adenomas.     F.  Colon, ascending polyp, biopsy:  -Portion of serrated polyp compatible with sessile or serrated adenoma.       Code Status: [unfilled]  Advance Directive and Living Will:      Power of :    POLST:        Assessment/Plan:    The patient presented to review the preoperative workup and see if bariatric surgery is appropriate and indicated following the extensive preoperative workup and the enrollment in our weight loss program.  Preoperative workup was complete. Results were reviewed with the patient including the blood work results and the endoscopy findings and the biopsy results. We also reviewed the cardiology evaluation.    The patient was determined to be a good candidate for Laparoscopic RNYGB with robotic assist (changed from plan of sleeve gastrectomy)    Hx barretts esophagus, positive on tissue exam. We discussed repeating EGD with biopsies before the procedure and proceed with Sleeve Gastrectomy or instead doing RNYGB which was recommended to be the better procedure for his situation. Patient is agreeable to RNYGB.  We will submit to insurance for RNYGB due to Barretts esophagus on tissue exam.     5cm HH on EGD  Hx diag lap for internal hernia caused by adhesive band 2022  Hx Hep A, patient received call from hepatology with clearance for surgery without biopsy needed    Hepatology message: Called and spoke with patient regarding his labs and US elastography results.     Patient's labs show persistently elevated serum transaminases although slightly improved when compared to prior.  His serologic evaluation was normal/negative for viral hepatitis as well as autoimmune and other genetic causes of liver disease.  A1 AT phenotype in process.     Patient's US elastography  was notable for F0-F1 fibrosis and S3 steatosis.     Discussed that his elevated liver enzymes are likely secondary to fatty liver disease and would recommend trending his liver tests with weight loss. Therefore, it is not necessary that he have a liver biopsy during the time of his bariatric surgery and will communicate this with his surgeon, Dr. Eligio Gilbert.       Provider was explaining this when the call was dropped. Provider tried calling patient back but call went straight to voicemail. M with the remaining information and to call back with any questions/concerns.  ----------------------------------------------------------------------  Smoking: Quit years ago  Blood thinner use: Denies  Home pain medication use and who manages it: Denies  CPAP use: Yes(If yes, sleep study obtained and reminded pt to bring CPAP to hospital)  History of blood clots: Denies  Previous abdominal surgeries: Internal hernia from adhesions   Cardiac clearance obtained: Yes   EKG performed: Yes  EGD prior to surgery: Yes  Screening Labs obtained (CBC, CMP): Yes  H. Pylori status: Negative  Medication allergies: NKDA  SLIM consult Post-Op: Yes  Reminded patient about 2 week pre-op liquid diet: Yes  10% body weight loss pre-operatively met/discussed: Yes  Consent signed: Yes  Pre-Op ERAS Orders placed: DONE for RNYGB  -----------------------------------------------------------------------  Risks and benefits explained one more time to the patient. Alternatives to surgery and alternative forms of surgery were also explained. Post-surgical commitment and after care programs were explained.  Consent was signed. Questions were answered and concerns were addressed. Patient will need to start the 2 week liquid diet prior to surgery.  Patient wishes to proceed. As per [facility] guidelines, I had a discussion with the patient regarding their CODE STATUS in the perioperative period and the patient is level 1 or FULL CODE  STATUS.    Joana Pham PA-C  Bariatric Surgery   8/1/2024  2:28 PM

## 2024-08-02 ENCOUNTER — OFFICE VISIT (OUTPATIENT)
Dept: SLEEP CENTER | Facility: CLINIC | Age: 49
End: 2024-08-02
Payer: COMMERCIAL

## 2024-08-02 VITALS
DIASTOLIC BLOOD PRESSURE: 70 MMHG | RESPIRATION RATE: 18 BRPM | WEIGHT: 267 LBS | SYSTOLIC BLOOD PRESSURE: 130 MMHG | HEIGHT: 69 IN | BODY MASS INDEX: 39.55 KG/M2

## 2024-08-02 DIAGNOSIS — G47.8 NON-RESTORATIVE SLEEP: ICD-10-CM

## 2024-08-02 DIAGNOSIS — Z98.84 BARIATRIC SURGERY STATUS: ICD-10-CM

## 2024-08-02 DIAGNOSIS — E11.9 TYPE 2 DIABETES MELLITUS WITHOUT COMPLICATION, WITHOUT LONG-TERM CURRENT USE OF INSULIN (HCC): ICD-10-CM

## 2024-08-02 DIAGNOSIS — G47.19 EXCESSIVE DAYTIME SLEEPINESS: ICD-10-CM

## 2024-08-02 DIAGNOSIS — F41.9 ANXIETY AND DEPRESSION: ICD-10-CM

## 2024-08-02 DIAGNOSIS — G47.33 OSA (OBSTRUCTIVE SLEEP APNEA): Primary | ICD-10-CM

## 2024-08-02 DIAGNOSIS — F32.A ANXIETY AND DEPRESSION: ICD-10-CM

## 2024-08-02 DIAGNOSIS — E66.01 MORBID (SEVERE) OBESITY DUE TO EXCESS CALORIES (HCC): Primary | ICD-10-CM

## 2024-08-02 PROCEDURE — 99214 OFFICE O/P EST MOD 30 MIN: CPT | Performed by: INTERNAL MEDICINE

## 2024-08-02 RX ORDER — OMEPRAZOLE 20 MG/1
20 CAPSULE, DELAYED RELEASE ORAL DAILY
Qty: 90 CAPSULE | Refills: 1 | Status: SHIPPED | OUTPATIENT
Start: 2024-08-02

## 2024-08-02 RX ORDER — OXYCODONE HYDROCHLORIDE 5 MG/1
5 TABLET ORAL EVERY 4 HOURS PRN
Qty: 5 TABLET | Refills: 0 | Status: SHIPPED | OUTPATIENT
Start: 2024-08-13

## 2024-08-02 NOTE — PROGRESS NOTES
Follow-Up Note - [facility]   Oswald Marvin  49 y.o. male  :1975  MRN:[id number]  DOS:2024    CC: I saw this patient for follow-up in clinic today for Sleep disordered breathing, Coexisting Sleep and Medical Problems.. Interval changes: A sleep study to titrate Positive airway pressure (PAP) therapy was undertaken because of persistent drowsiness in spite of using CPAP.. Patient is here to review results and to initiate/adjust therapy.  Presently he is using a CPAP machine and is inquiring necessity to switch to BiPAP.    The study demonstrated sleep disordered breathing was adequately remediated with PAP at 16 cm H2O. Pressure had to be increased and he was switched to BiPAP mainly because of snoring.  He developed some central events but snoring and respiratory events were eliminated at a pressure of 23/19 cm H2O minimum oxygen saturation maintained above 95%.    The study demonstrated an apnea/hypopnea index (AHI) of 40.3 events per hour of sleep.  The AHI in the supine position was N/A.  The AHI during REM sleep was N/A. The baseline oxygen saturation with the patient awake was 95.2%.  The mean oxygen saturation throughout the study was 94.6%.  The amount of sleep time below 90% was 0.0 minutes.  The lowest oxygen saturation was 89.0%.  Severity is likely understated because he did not discontinue CPAP prior to the study and he was not observed during stage REM    PFSH, Problem List, Medications & Allergies were reviewed in EMR.   He  has a past medical history of Colon polyp, Diabetes (HCC), Diabetes mellitus (HCC), GERD (gastroesophageal reflux disease), Hyperlipidemia, and YURIY on CPAP.    He has a current medication list which includes the following prescription(s): atorvastatin, onetouch verio reflect, onetouch verio, metformin, omeprazole, omeprazole, onetouch delica lancets 33g, [START ON 2024] oxycodone, venlafaxine, [START ON 8/15/2024] atovaquone-proguanil, and  "dulaglutide.    PHYSIOLOGICAL DATA REVIEW : Using PAP > 4 hours/night 100%. Estimated JODY 4.5/hour with pressure of 18.5cm H2O@90th/95th percentile; patient has not been using non FDA approved devices to sanitize the machine.  INTERPRETATION: Compliance is excellent; Pressure setting is:in acceptable range; ;   SUBJECTIVE: With respect to use of PAP, Oswald  is experiencing no adverse effects:.He derives benefit.  Is satisfied with sleep and daytime function.   Sleep Routine: Oswald reports getting 6-9 hrs sleep; he has no difficulty initiating or maintaining sleep . He arises needing an alarm and is not always refreshed.Oswald reports somewhat improved daytime sleepiness, feels like napping but avoids.  He rated himself at Total score: 13 /24 on the [place] Sleepiness Scale.   Other issues: None reported.     Habits: Reports that he quit smoking about 12 years ago. His smoking use included cigarettes. He has a 45 pack-year smoking history. He has never been exposed to tobacco smoke. He has never used smokeless tobacco.,  Reports no history of alcohol use.,  Reports no history of drug use., Caffeine use:limited; Exercise routine: none.      ROS: Significant for weight is stable within a few pounds.  Acid reflux is controlled.  Weight is stable on current medication.  Review of systems was otherwise negative..    EXAM: /70   Resp 18   Ht 5' 9.2\" (1.758 m)   Wt 121 kg (267 lb)   BMI 39.20 kg/m²     Wt Readings from Last 3 Encounters:   08/02/24 121 kg (267 lb)   08/01/24 119 kg (262 lb 8 oz)   07/24/24 120 kg (264 lb)      Patient is well groomed; well appearing.   CNS: Alert, orientated, speech clear & coherent  Psych: cooperative and in no distress. Mental state: Appears normal.  H&N: EOMI; NC/AT: No facial pressure marks, no rashes.    Skin/Extrem: col & hydration normal; no edema  Resp: Respiratory effort is normal  Physical findings otherwise essentially unchanged from " "previous.    IMPRESSION: Problem List Items & Comorbidities Addressed this Visit    1. YURIY (obstructive sleep apnea)  Discontinue BIPAP      2. Non-restorative sleep        3. Excessive daytime sleepiness        4. Anxiety and depression        5. Type 2 diabetes mellitus without complication, without long-term current use of insulin ([facility])        6. BMI 39.0-39.9,adult            PLAN:  I reviewed results of prior studies and physiologic data with the patient.   I discussed treatment options with risks and benefits.  If he is willing to tolerate some snoring, does not need to change to BiPAP.  He is due to have bariatric surgery shortly and changes his insurance in a month.  Treatment with  PAP is medically necessary and Oswald is agreable to continue use.   Care of equipment, methods to improve comfort using PAP and importance of compliance with therapy were discussed.  Pressure setting: continue 14-20 cmH2O.    Rx provided to replace supplies and Care coordinated with DME provider.   Encouraged to persist with strategies for weight reduction.    Follow-up is advised in 6 months or sooner if needed to monitor progress, compliance and to adjust therapy.     Thank you for allowing me to participate in the care of this patient.    Sincerely,     Authenticated electronically on 08/02/24   Board Certified Specialist     Portions of the record may have been created with voice recognition software. Occasional wrong word or \"sound a like\" substitutions may have occurred due to the inherent limitations of voice recognition software. There may also be notations and random deletions of words or characters from malfunctioning software. Read the chart carefully and recognize, using context, where substitutions/deletions have occurred.    "

## 2024-08-02 NOTE — TELEPHONE ENCOUNTER
Medications for Laparoscopic HEDY-EN-Y Gastric Bypass and Intraoperative EGD Robotically Assisted on 08/12/2024.      **No Known Allergies**

## 2024-08-05 ENCOUNTER — TELEPHONE (OUTPATIENT)
Dept: SLEEP CENTER | Facility: CLINIC | Age: 49
End: 2024-08-05

## 2024-08-09 ENCOUNTER — ANESTHESIA EVENT (OUTPATIENT)
Dept: PERIOP | Facility: HOSPITAL | Age: 49
DRG: 403 | End: 2024-08-09
Payer: COMMERCIAL

## 2024-08-11 RX ORDER — INSULIN LISPRO 100 [IU]/ML
2-12 INJECTION, SOLUTION INTRAVENOUS; SUBCUTANEOUS EVERY 6 HOURS SCHEDULED
Status: CANCELLED | OUTPATIENT
Start: 2024-08-11

## 2024-08-12 ENCOUNTER — TELEPHONE (OUTPATIENT)
Dept: GASTROENTEROLOGY | Facility: CLINIC | Age: 49
End: 2024-08-12

## 2024-08-12 ENCOUNTER — HOSPITAL ENCOUNTER (INPATIENT)
Facility: HOSPITAL | Age: 49
LOS: 1 days | Discharge: HOME | DRG: 403 | End: 2024-08-13
Attending: SURGERY | Admitting: SURGERY
Payer: COMMERCIAL

## 2024-08-12 ENCOUNTER — ANESTHESIA (OUTPATIENT)
Dept: PERIOP | Facility: HOSPITAL | Age: 49
DRG: 403 | End: 2024-08-12
Payer: COMMERCIAL

## 2024-08-12 DIAGNOSIS — E11.9 TYPE 2 DIABETES MELLITUS WITHOUT COMPLICATION, WITHOUT LONG-TERM CURRENT USE OF INSULIN (HCC): ICD-10-CM

## 2024-08-12 DIAGNOSIS — E66.9 OBESITY, CLASS II, BMI 35-39.9: Primary | ICD-10-CM

## 2024-08-12 LAB
GLUCOSE SERPL-MCNC: 161 MG/DL (ref 65–140)
GLUCOSE SERPL-MCNC: 218 MG/DL (ref 65–140)
GLUCOSE SERPL-MCNC: 284 MG/DL (ref 65–140)

## 2024-08-12 PROCEDURE — 8E0W4CZ ROBOTIC ASSISTED PROCEDURE OF TRUNK REGION, PERCUTANEOUS ENDOSCOPIC APPROACH: ICD-10-PCS | Performed by: SURGERY

## 2024-08-12 PROCEDURE — 43644 LAP GASTRIC BYPASS/ROUX-EN-Y: CPT | Performed by: STUDENT IN AN ORGANIZED HEALTH CARE EDUCATION/TRAINING PROGRAM

## 2024-08-12 PROCEDURE — 43644 LAP GASTRIC BYPASS/ROUX-EN-Y: CPT | Performed by: SURGERY

## 2024-08-12 PROCEDURE — 43281 LAP PARAESOPHAG HERN REPAIR: CPT | Performed by: STUDENT IN AN ORGANIZED HEALTH CARE EDUCATION/TRAINING PROGRAM

## 2024-08-12 PROCEDURE — 82948 REAGENT STRIP/BLOOD GLUCOSE: CPT

## 2024-08-12 PROCEDURE — S2900 ROBOTIC SURGICAL SYSTEM: HCPCS | Performed by: SURGERY

## 2024-08-12 PROCEDURE — C1781 MESH (IMPLANTABLE): HCPCS | Performed by: SURGERY

## 2024-08-12 PROCEDURE — 0D164ZA BYPASS STOMACH TO JEJUNUM, PERCUTANEOUS ENDOSCOPIC APPROACH: ICD-10-PCS | Performed by: SURGERY

## 2024-08-12 PROCEDURE — C9290 INJ, BUPIVACAINE LIPOSOME: HCPCS | Performed by: PHYSICIAN ASSISTANT

## 2024-08-12 PROCEDURE — 0BQT4ZZ REPAIR DIAPHRAGM, PERCUTANEOUS ENDOSCOPIC APPROACH: ICD-10-PCS | Performed by: SURGERY

## 2024-08-12 PROCEDURE — 43281 LAP PARAESOPHAG HERN REPAIR: CPT | Performed by: SURGERY

## 2024-08-12 DEVICE — SEAMGUARD STPL REINF INTUITIVE SUREFORM 60 GREEN: Type: IMPLANTABLE DEVICE | Site: ABDOMEN | Status: FUNCTIONAL

## 2024-08-12 RX ORDER — SODIUM CHLORIDE 9 MG/ML
125 INJECTION, SOLUTION INTRAVENOUS CONTINUOUS
Status: DISCONTINUED | OUTPATIENT
Start: 2024-08-12 | End: 2024-08-13 | Stop reason: HOSPADM

## 2024-08-12 RX ORDER — ONDANSETRON 2 MG/ML
4 INJECTION INTRAMUSCULAR; INTRAVENOUS ONCE AS NEEDED
Status: COMPLETED | OUTPATIENT
Start: 2024-08-12 | End: 2024-08-12

## 2024-08-12 RX ORDER — PROMETHAZINE HYDROCHLORIDE 25 MG/ML
25 INJECTION, SOLUTION INTRAMUSCULAR; INTRAVENOUS EVERY 6 HOURS PRN
Status: DISCONTINUED | OUTPATIENT
Start: 2024-08-12 | End: 2024-08-12

## 2024-08-12 RX ORDER — SODIUM CHLORIDE 9 MG/ML
INJECTION, SOLUTION INTRAVENOUS AS NEEDED
Status: DISCONTINUED | OUTPATIENT
Start: 2024-08-12 | End: 2024-08-12 | Stop reason: HOSPADM

## 2024-08-12 RX ORDER — FENTANYL CITRATE 50 UG/ML
INJECTION, SOLUTION INTRAMUSCULAR; INTRAVENOUS AS NEEDED
Status: DISCONTINUED | OUTPATIENT
Start: 2024-08-12 | End: 2024-08-12

## 2024-08-12 RX ORDER — ACETAMINOPHEN 10 MG/ML
1000 INJECTION, SOLUTION INTRAVENOUS EVERY 6 HOURS SCHEDULED
Status: DISCONTINUED | OUTPATIENT
Start: 2024-08-12 | End: 2024-08-13 | Stop reason: HOSPADM

## 2024-08-12 RX ORDER — VENLAFAXINE HYDROCHLORIDE 75 MG/1
75 CAPSULE, EXTENDED RELEASE ORAL DAILY
Status: DISCONTINUED | OUTPATIENT
Start: 2024-08-12 | End: 2024-08-13 | Stop reason: HOSPADM

## 2024-08-12 RX ORDER — LIDOCAINE HYDROCHLORIDE 10 MG/ML
INJECTION, SOLUTION EPIDURAL; INFILTRATION; INTRACAUDAL; PERINEURAL AS NEEDED
Status: DISCONTINUED | OUTPATIENT
Start: 2024-08-12 | End: 2024-08-12

## 2024-08-12 RX ORDER — DEXAMETHASONE SODIUM PHOSPHATE 10 MG/ML
INJECTION, SOLUTION INTRAMUSCULAR; INTRAVENOUS AS NEEDED
Status: DISCONTINUED | OUTPATIENT
Start: 2024-08-12 | End: 2024-08-12

## 2024-08-12 RX ORDER — MORPHINE SULFATE 4 MG/ML
4 INJECTION, SOLUTION INTRAMUSCULAR; INTRAVENOUS EVERY 4 HOURS PRN
Status: DISCONTINUED | OUTPATIENT
Start: 2024-08-12 | End: 2024-08-13 | Stop reason: HOSPADM

## 2024-08-12 RX ORDER — KETOROLAC TROMETHAMINE 30 MG/ML
15 INJECTION, SOLUTION INTRAMUSCULAR; INTRAVENOUS EVERY 6 HOURS SCHEDULED
Status: DISCONTINUED | OUTPATIENT
Start: 2024-08-12 | End: 2024-08-13 | Stop reason: HOSPADM

## 2024-08-12 RX ORDER — HYDROMORPHONE HCL/PF 1 MG/ML
0.5 SYRINGE (ML) INJECTION
Status: DISCONTINUED | OUTPATIENT
Start: 2024-08-12 | End: 2024-08-12 | Stop reason: HOSPADM

## 2024-08-12 RX ORDER — ONDANSETRON 2 MG/ML
INJECTION INTRAMUSCULAR; INTRAVENOUS AS NEEDED
Status: DISCONTINUED | OUTPATIENT
Start: 2024-08-12 | End: 2024-08-12

## 2024-08-12 RX ORDER — HYDROMORPHONE HYDROCHLORIDE 2 MG/ML
INJECTION, SOLUTION INTRAMUSCULAR; INTRAVENOUS; SUBCUTANEOUS AS NEEDED
Status: DISCONTINUED | OUTPATIENT
Start: 2024-08-12 | End: 2024-08-12

## 2024-08-12 RX ORDER — SIMETHICONE 80 MG
80 TABLET,CHEWABLE ORAL 4 TIMES DAILY PRN
Status: DISCONTINUED | OUTPATIENT
Start: 2024-08-12 | End: 2024-08-13 | Stop reason: HOSPADM

## 2024-08-12 RX ORDER — CELECOXIB 200 MG/1
200 CAPSULE ORAL ONCE
Status: COMPLETED | OUTPATIENT
Start: 2024-08-12 | End: 2024-08-12

## 2024-08-12 RX ORDER — DIPHENHYDRAMINE HCL 25 MG
25 TABLET ORAL EVERY 8 HOURS PRN
Status: DISCONTINUED | OUTPATIENT
Start: 2024-08-12 | End: 2024-08-13 | Stop reason: HOSPADM

## 2024-08-12 RX ORDER — DEXMEDETOMIDINE HYDROCHLORIDE 100 UG/ML
INJECTION, SOLUTION INTRAVENOUS AS NEEDED
Status: DISCONTINUED | OUTPATIENT
Start: 2024-08-12 | End: 2024-08-12

## 2024-08-12 RX ORDER — MIDAZOLAM HYDROCHLORIDE 2 MG/2ML
INJECTION, SOLUTION INTRAMUSCULAR; INTRAVENOUS AS NEEDED
Status: DISCONTINUED | OUTPATIENT
Start: 2024-08-12 | End: 2024-08-12

## 2024-08-12 RX ORDER — PROMETHAZINE HYDROCHLORIDE 25 MG/ML
25 INJECTION, SOLUTION INTRAMUSCULAR; INTRAVENOUS EVERY 6 HOURS PRN
Status: DISCONTINUED | OUTPATIENT
Start: 2024-08-12 | End: 2024-08-13 | Stop reason: HOSPADM

## 2024-08-12 RX ORDER — BUPIVACAINE HYDROCHLORIDE 5 MG/ML
INJECTION, SOLUTION EPIDURAL; INTRACAUDAL AS NEEDED
Status: DISCONTINUED | OUTPATIENT
Start: 2024-08-12 | End: 2024-08-12 | Stop reason: HOSPADM

## 2024-08-12 RX ORDER — ACETAMINOPHEN 10 MG/ML
1000 INJECTION, SOLUTION INTRAVENOUS ONCE
Status: COMPLETED | OUTPATIENT
Start: 2024-08-12 | End: 2024-08-12

## 2024-08-12 RX ORDER — METOCLOPRAMIDE HYDROCHLORIDE 5 MG/ML
10 INJECTION INTRAMUSCULAR; INTRAVENOUS EVERY 6 HOURS PRN
Status: DISCONTINUED | OUTPATIENT
Start: 2024-08-12 | End: 2024-08-13 | Stop reason: HOSPADM

## 2024-08-12 RX ORDER — FAMOTIDINE 10 MG/ML
20 INJECTION, SOLUTION INTRAVENOUS 2 TIMES DAILY
Status: DISCONTINUED | OUTPATIENT
Start: 2024-08-12 | End: 2024-08-13 | Stop reason: HOSPADM

## 2024-08-12 RX ORDER — EPHEDRINE SULFATE 50 MG/ML
INJECTION INTRAVENOUS AS NEEDED
Status: DISCONTINUED | OUTPATIENT
Start: 2024-08-12 | End: 2024-08-12

## 2024-08-12 RX ORDER — ATORVASTATIN CALCIUM 20 MG/1
20 TABLET, FILM COATED ORAL DAILY
Status: DISCONTINUED | OUTPATIENT
Start: 2024-08-12 | End: 2024-08-13 | Stop reason: HOSPADM

## 2024-08-12 RX ORDER — ONDANSETRON 2 MG/ML
4 INJECTION INTRAMUSCULAR; INTRAVENOUS EVERY 6 HOURS PRN
Status: DISCONTINUED | OUTPATIENT
Start: 2024-08-12 | End: 2024-08-13 | Stop reason: HOSPADM

## 2024-08-12 RX ORDER — SODIUM CHLORIDE, SODIUM LACTATE, POTASSIUM CHLORIDE, CALCIUM CHLORIDE 600; 310; 30; 20 MG/100ML; MG/100ML; MG/100ML; MG/100ML
INJECTION, SOLUTION INTRAVENOUS CONTINUOUS PRN
Status: DISCONTINUED | OUTPATIENT
Start: 2024-08-12 | End: 2024-08-12

## 2024-08-12 RX ORDER — ROCURONIUM BROMIDE 10 MG/ML
INJECTION, SOLUTION INTRAVENOUS AS NEEDED
Status: DISCONTINUED | OUTPATIENT
Start: 2024-08-12 | End: 2024-08-12

## 2024-08-12 RX ORDER — GLYCOPYRROLATE 0.2 MG/ML
INJECTION INTRAMUSCULAR; INTRAVENOUS AS NEEDED
Status: DISCONTINUED | OUTPATIENT
Start: 2024-08-12 | End: 2024-08-12

## 2024-08-12 RX ORDER — OXYCODONE HCL 5 MG/5 ML
5 SOLUTION, ORAL ORAL EVERY 4 HOURS PRN
Status: DISCONTINUED | OUTPATIENT
Start: 2024-08-12 | End: 2024-08-13 | Stop reason: HOSPADM

## 2024-08-12 RX ORDER — CEFAZOLIN SODIUM 2 G/50ML
2000 SOLUTION INTRAVENOUS ONCE
Status: COMPLETED | OUTPATIENT
Start: 2024-08-12 | End: 2024-08-12

## 2024-08-12 RX ORDER — SODIUM CHLORIDE, SODIUM LACTATE, POTASSIUM CHLORIDE, CALCIUM CHLORIDE 600; 310; 30; 20 MG/100ML; MG/100ML; MG/100ML; MG/100ML
100 INJECTION, SOLUTION INTRAVENOUS CONTINUOUS
Status: DISCONTINUED | OUTPATIENT
Start: 2024-08-12 | End: 2024-08-13 | Stop reason: HOSPADM

## 2024-08-12 RX ORDER — PROPOFOL 10 MG/ML
INJECTION, EMULSION INTRAVENOUS AS NEEDED
Status: DISCONTINUED | OUTPATIENT
Start: 2024-08-12 | End: 2024-08-12

## 2024-08-12 RX ORDER — OXYCODONE HCL 5 MG/5 ML
10 SOLUTION, ORAL ORAL EVERY 4 HOURS PRN
Status: DISCONTINUED | OUTPATIENT
Start: 2024-08-12 | End: 2024-08-13 | Stop reason: HOSPADM

## 2024-08-12 RX ORDER — HEPARIN SODIUM 5000 [USP'U]/ML
5000 INJECTION, SOLUTION INTRAVENOUS; SUBCUTANEOUS ONCE
Status: COMPLETED | OUTPATIENT
Start: 2024-08-12 | End: 2024-08-12

## 2024-08-12 RX ADMIN — EPHEDRINE SULFATE 10 MG: 50 INJECTION INTRAVENOUS at 10:36

## 2024-08-12 RX ADMIN — ROCURONIUM BROMIDE 20 MG: 10 INJECTION, SOLUTION INTRAVENOUS at 10:34

## 2024-08-12 RX ADMIN — SIMETHICONE 80 MG: 80 TABLET, CHEWABLE ORAL at 15:29

## 2024-08-12 RX ADMIN — PROPOFOL 200 MG: 10 INJECTION, EMULSION INTRAVENOUS at 10:18

## 2024-08-12 RX ADMIN — SUGAMMADEX 400 MG: 100 INJECTION, SOLUTION INTRAVENOUS at 12:56

## 2024-08-12 RX ADMIN — DEXMEDETOMIDINE HCL 12 MCG: 100 INJECTION INTRAVENOUS at 10:17

## 2024-08-12 RX ADMIN — ONDANSETRON 4 MG: 2 INJECTION INTRAMUSCULAR; INTRAVENOUS at 13:48

## 2024-08-12 RX ADMIN — OXYCODONE HYDROCHLORIDE 10 MG: 5 SOLUTION ORAL at 19:42

## 2024-08-12 RX ADMIN — HEPARIN SODIUM 5000 UNITS: 5000 INJECTION INTRAVENOUS; SUBCUTANEOUS at 07:59

## 2024-08-12 RX ADMIN — ONDANSETRON 4 MG: 2 INJECTION INTRAMUSCULAR; INTRAVENOUS at 10:32

## 2024-08-12 RX ADMIN — KETOROLAC TROMETHAMINE 15 MG: 30 INJECTION, SOLUTION INTRAMUSCULAR; INTRAVENOUS at 23:23

## 2024-08-12 RX ADMIN — MORPHINE SULFATE 4 MG: 4 INJECTION INTRAVENOUS at 15:29

## 2024-08-12 RX ADMIN — SODIUM CHLORIDE, SODIUM LACTATE, POTASSIUM CHLORIDE, AND CALCIUM CHLORIDE 100 ML/HR: .6; .31; .03; .02 INJECTION, SOLUTION INTRAVENOUS at 15:30

## 2024-08-12 RX ADMIN — SODIUM CHLORIDE 125 ML/HR: 0.9 INJECTION, SOLUTION INTRAVENOUS at 08:10

## 2024-08-12 RX ADMIN — HYDROMORPHONE HYDROCHLORIDE 0.5 MG: 2 INJECTION INTRAMUSCULAR; INTRAVENOUS; SUBCUTANEOUS at 11:05

## 2024-08-12 RX ADMIN — ACETAMINOPHEN 1000 MG: 10 INJECTION INTRAVENOUS at 23:23

## 2024-08-12 RX ADMIN — SODIUM CHLORIDE, SODIUM LACTATE, POTASSIUM CHLORIDE, AND CALCIUM CHLORIDE: .6; .31; .03; .02 INJECTION, SOLUTION INTRAVENOUS at 12:57

## 2024-08-12 RX ADMIN — EPHEDRINE SULFATE 10 MG: 50 INJECTION INTRAVENOUS at 11:18

## 2024-08-12 RX ADMIN — DEXAMETHASONE SODIUM PHOSPHATE 10 MG: 10 INJECTION INTRAMUSCULAR; INTRAVENOUS at 10:32

## 2024-08-12 RX ADMIN — ACETAMINOPHEN 1000 MG: 10 INJECTION INTRAVENOUS at 09:08

## 2024-08-12 RX ADMIN — DEXMEDETOMIDINE HCL 8 MCG: 100 INJECTION INTRAVENOUS at 10:31

## 2024-08-12 RX ADMIN — GLYCOPYRROLATE 0.2 MG: 0.2 INJECTION, SOLUTION INTRAMUSCULAR; INTRAVENOUS at 10:17

## 2024-08-12 RX ADMIN — ROCURONIUM BROMIDE 60 MG: 10 INJECTION, SOLUTION INTRAVENOUS at 10:18

## 2024-08-12 RX ADMIN — CELECOXIB 200 MG: 200 CAPSULE ORAL at 07:59

## 2024-08-12 RX ADMIN — PROPOFOL 50 MG: 10 INJECTION, EMULSION INTRAVENOUS at 12:56

## 2024-08-12 RX ADMIN — DEXMEDETOMIDINE HCL 12 MCG: 100 INJECTION INTRAVENOUS at 10:25

## 2024-08-12 RX ADMIN — LIDOCAINE HYDROCHLORIDE 100 MG: 10 INJECTION, SOLUTION EPIDURAL; INFILTRATION; INTRACAUDAL; PERINEURAL at 10:18

## 2024-08-12 RX ADMIN — PHENYLEPHRINE HYDROCHLORIDE 25 MCG/MIN: 10 INJECTION INTRAVENOUS at 12:05

## 2024-08-12 RX ADMIN — CEFAZOLIN SODIUM 2000 MG: 2 SOLUTION INTRAVENOUS at 10:31

## 2024-08-12 RX ADMIN — ACETAMINOPHEN 1000 MG: 10 INJECTION INTRAVENOUS at 17:17

## 2024-08-12 RX ADMIN — HYDROMORPHONE HYDROCHLORIDE 0.5 MG: 2 INJECTION INTRAMUSCULAR; INTRAVENOUS; SUBCUTANEOUS at 12:51

## 2024-08-12 RX ADMIN — KETOROLAC TROMETHAMINE 15 MG: 30 INJECTION, SOLUTION INTRAMUSCULAR; INTRAVENOUS at 17:18

## 2024-08-12 RX ADMIN — HYDROMORPHONE HYDROCHLORIDE 0.5 MG: 1 INJECTION, SOLUTION INTRAMUSCULAR; INTRAVENOUS; SUBCUTANEOUS at 14:19

## 2024-08-12 RX ADMIN — MIDAZOLAM 2 MG: 1 INJECTION INTRAMUSCULAR; INTRAVENOUS at 10:17

## 2024-08-12 RX ADMIN — ROCURONIUM BROMIDE 20 MG: 10 INJECTION, SOLUTION INTRAVENOUS at 11:19

## 2024-08-12 RX ADMIN — DEXMEDETOMIDINE HCL 8 MCG: 100 INJECTION INTRAVENOUS at 10:38

## 2024-08-12 RX ADMIN — SODIUM CHLORIDE, SODIUM LACTATE, POTASSIUM CHLORIDE, AND CALCIUM CHLORIDE 100 ML/HR: .6; .31; .03; .02 INJECTION, SOLUTION INTRAVENOUS at 23:26

## 2024-08-12 RX ADMIN — FOSAPREPITANT DIMEGLUMINE 150 MG: 150 INJECTION, POWDER, LYOPHILIZED, FOR SOLUTION INTRAVENOUS at 08:11

## 2024-08-12 RX ADMIN — FAMOTIDINE 20 MG: 10 INJECTION, SOLUTION INTRAVENOUS at 20:46

## 2024-08-12 RX ADMIN — SODIUM CHLORIDE, SODIUM LACTATE, POTASSIUM CHLORIDE, AND CALCIUM CHLORIDE: .6; .31; .03; .02 INJECTION, SOLUTION INTRAVENOUS at 10:29

## 2024-08-12 RX ADMIN — FENTANYL CITRATE 100 MCG: 50 INJECTION INTRAMUSCULAR; INTRAVENOUS at 10:18

## 2024-08-12 NOTE — PLAN OF CARE
Problem: PAIN - ADULT  Goal: Verbalizes/displays adequate comfort level or baseline comfort level  Description: Interventions:  - Encourage patient to monitor pain and request assistance  - Assess pain using appropriate pain scale  - Administer analgesics based on type and severity of pain and evaluate response  - Implement non-pharmacological measures as appropriate and evaluate response  - Consider cultural and social influences on pain and pain management  - Notify physician/advanced practitioner if interventions unsuccessful or patient reports new pain  Outcome: Progressing     Problem: INFECTION - ADULT  Goal: Absence or prevention of progression during hospitalization  Description: INTERVENTIONS:  - Assess and monitor for signs and symptoms of infection  - Monitor lab/diagnostic results  - Monitor all insertion sites, i.e. indwelling lines, tubes, and drains  - Monitor endotracheal if appropriate and nasal secretions for changes in amount and color  - [place] appropriate cooling/warming therapies per order  - Administer medications as ordered  - Instruct and encourage patient and family to use good hand hygiene technique  - Identify and instruct in appropriate isolation precautions for identified infection/condition  Outcome: Progressing  Goal: Absence of fever/infection during neutropenic period  Description: INTERVENTIONS:  - Monitor WBC    Outcome: Progressing     Problem: SAFETY ADULT  Goal: Patient will remain free of falls  Description: INTERVENTIONS:  - Educate patient/family on patient safety including physical limitations  - Instruct patient to call for assistance with activity   - Consult OT/PT to assist with strengthening/mobility   - Keep Call bell within reach  - Keep bed low and locked with side rails adjusted as appropriate  - Keep care items and personal belongings within reach  - Initiate and maintain comfort rounds  - Make Fall Risk Sign visible to staff  - Apply yellow socks and bracelet  for high fall risk patients  - Consider moving patient to room near nurses station  Outcome: Progressing  Goal: Maintain or return to baseline ADL function  Description: INTERVENTIONS:  -  Assess patient's ability to carry out ADLs; assess patient's baseline for ADL function and identify physical deficits which impact ability to perform ADLs (bathing, care of mouth/teeth, toileting, grooming, dressing, etc.)  - Assess/evaluate cause of self-care deficits   - Assess range of motion  - Assess patient's mobility; develop plan if impaired  - Assess patient's need for assistive devices and provide as appropriate  - Encourage maximum independence but intervene and supervise when necessary  - Involve family in performance of ADLs  - Assess for home care needs following discharge   - Consider OT consult to assist with ADL evaluation and planning for discharge  - Provide patient education as appropriate  Outcome: Progressing  Goal: Maintains/Returns to pre admission functional level  Description: INTERVENTIONS:  - Perform AM-PAC 6 Click Basic Mobility/ Daily Activity assessment daily.  - Set and communicate daily mobility goal to care team and patient/family/caregiver.   - Collaborate with rehabilitation services on mobility goals if consulted  - Out of bed for toileting  - Record patient progress and toleration of activity level   Outcome: Progressing     Problem: DISCHARGE PLANNING  Goal: Discharge to home or other facility with appropriate resources  Description: INTERVENTIONS:  - Identify barriers to discharge w/patient and caregiver  - Arrange for needed discharge resources and transportation as appropriate  - Identify discharge learning needs (meds, wound care, etc.)  - Arrange for interpretive services to assist at discharge as needed  - Refer to Case Management Department for coordinating discharge planning if the patient needs post-hospital services based on physician/advanced practitioner order or complex needs  related to functional status, cognitive ability, or social support system  Outcome: Progressing     Problem: Knowledge Deficit  Goal: Patient/family/caregiver demonstrates understanding of disease process, treatment plan, medications, and discharge instructions  Description: Complete learning assessment and assess knowledge base.  Interventions:  - Provide teaching at level of understanding  - Provide teaching via preferred learning methods  Outcome: Progressing

## 2024-08-12 NOTE — PLAN OF CARE
Problem: PAIN - ADULT  Goal: Verbalizes/displays adequate comfort level or baseline comfort level  Description: Interventions:  - Encourage patient to monitor pain and request assistance  - Assess pain using appropriate pain scale  - Administer analgesics based on type and severity of pain and evaluate response  - Implement non-pharmacological measures as appropriate and evaluate response  - Consider cultural and social influences on pain and pain management  - Notify physician/advanced practitioner if interventions unsuccessful or patient reports new pain  Outcome: Progressing     Problem: INFECTION - ADULT  Goal: Absence or prevention of progression during hospitalization  Description: INTERVENTIONS:  - Assess and monitor for signs and symptoms of infection  - Monitor lab/diagnostic results  - Monitor all insertion sites, i.e. indwelling lines, tubes, and drains  - Monitor endotracheal if appropriate and nasal secretions for changes in amount and color  - [place] appropriate cooling/warming therapies per order  - Administer medications as ordered  - Instruct and encourage patient and family to use good hand hygiene technique  - Identify and instruct in appropriate isolation precautions for identified infection/condition  Outcome: Progressing  Goal: Absence of fever/infection during neutropenic period  Description: INTERVENTIONS:  - Monitor WBC    Outcome: Progressing     Problem: SAFETY ADULT  Goal: Patient will remain free of falls  Description: INTERVENTIONS:  - Educate patient/family on patient safety including physical limitations  - Instruct patient to call for assistance with activity   - Consult OT/PT to assist with strengthening/mobility   - Keep Call bell within reach  - Keep bed low and locked with side rails adjusted as appropriate  - Keep care items and personal belongings within reach  - Initiate and maintain comfort rounds  - Make Fall Risk Sign visible to staff  - Offer Toileting every  Hours,  in advance of need  - Initiate/Maintain alarm  - Obtain necessary fall risk management equipment:   - Apply yellow socks and bracelet for high fall risk patients  - Consider moving patient to room near nurses station  Outcome: Progressing  Goal: Maintain or return to baseline ADL function  Description: INTERVENTIONS:  -  Assess patient's ability to carry out ADLs; assess patient's baseline for ADL function and identify physical deficits which impact ability to perform ADLs (bathing, care of mouth/teeth, toileting, grooming, dressing, etc.)  - Assess/evaluate cause of self-care deficits   - Assess range of motion  - Assess patient's mobility; develop plan if impaired  - Assess patient's need for assistive devices and provide as appropriate  - Encourage maximum independence but intervene and supervise when necessary  - Involve family in performance of ADLs  - Assess for home care needs following discharge   - Consider OT consult to assist with ADL evaluation and planning for discharge  - Provide patient education as appropriate  Outcome: Progressing  Goal: Maintains/Returns to pre admission functional level  Description: INTERVENTIONS:  - Perform AM-PAC 6 Click Basic Mobility/ Daily Activity assessment daily.  - Set and communicate daily mobility goal to care team and patient/family/caregiver.   - Collaborate with rehabilitation services on mobility goals if consulted  - Perform Range of Motion  times a day.  - Reposition patient every  hours.  - Dangle patient  times a day  - Stand patient  times a day  - Ambulate patient  times a day  - Out of bed to chair  times a day   - Out of bed for meals times a day  - Out of bed for toileting  - Record patient progress and toleration of activity level   Outcome: Progressing     Problem: DISCHARGE PLANNING  Goal: Discharge to home or other facility with appropriate resources  Description: INTERVENTIONS:  - Identify barriers to discharge w/patient and caregiver  - Arrange for  needed discharge resources and transportation as appropriate  - Identify discharge learning needs (meds, wound care, etc.)  - Arrange for interpretive services to assist at discharge as needed  - Refer to Case Management Department for coordinating discharge planning if the patient needs post-hospital services based on physician/advanced practitioner order or complex needs related to functional status, cognitive ability, or social support system  Outcome: Progressing     Problem: Knowledge Deficit  Goal: Patient/family/caregiver demonstrates understanding of disease process, treatment plan, medications, and discharge instructions  Description: Complete learning assessment and assess knowledge base.  Interventions:  - Provide teaching at level of understanding  - Provide teaching via preferred learning methods  Outcome: Progressing

## 2024-08-12 NOTE — ANESTHESIA POSTPROCEDURE EVALUATION
Post-Op Assessment Note    CV Status:  Stable  Pain Score: 0    Pain management: adequate    Multimodal analgesia used between 6 hours prior to anesthesia start to PACU discharge    Mental Status:  Arousable   Hydration Status:  Stable   PONV Controlled:  None   Airway Patency:  Patent  Airway: intubated  Two or more mitigation strategies used for obstructive sleep apnea   Post Op Vitals Reviewed: Yes    No anethesia notable event occurred.    Staff: CRNA               BP   121/58   Temp   98   Pulse  98   Resp   12   SpO2   100

## 2024-08-12 NOTE — ANESTHESIA PREPROCEDURE EVALUATION
Procedure:  BYPASS GASTRIC R-N-Y  W ROBOTICS & INTRAOP EGD (Abdomen)        Relevant Problems   ANESTHESIA  Pt difficult to sedate x2 for colonoscopy and vasectomy.  Per Dr. Jaiyeola, pt was violent/punching and his colon had to be aborted last time.    (+) History of anesthesia complications      CARDIO   (+) Hyperlipidemia      ENDO   (+) Type 2 diabetes mellitus without complication, without long-term current use of insulin ([facility])      GI/HEPATIC   (+) SBO (small bowel obstruction) ([facility])      MUSCULOSKELETAL   (+) Cervical spondylosis without myelopathy      NEURO/PSYCH   (+) Anxiety   (+) Mild episode of recurrent major depressive disorder ([facility])      PULMONARY  Quit smoking 2012  20 yr smoking hx    (+) YURIY (obstructive sleep apnea)   (+) Obstructive sleep apnea      Behavioral Health   (+) Attention deficit disorder without hyperactivity      Other   (+) Class 2 severe obesity due to excess calories with serious comorbidity and body mass index (BMI) of 37.0 to 37.9 in adult ([facility]) (Resolved)   (+) Obesity, Class II, BMI 35-39.9        Physical Exam    Airway    Mallampati score: III  TM Distance: >3 FB  Neck ROM: full     Dental       Cardiovascular  Cardiovascular exam normal    Pulmonary  Pulmonary exam normal     Other Findings        Anesthesia Plan  ASA Score- 3     Anesthesia Type- general with ASA Monitors.         Additional Monitors:     Airway Plan: ETT.    Comment: Can be combative waking up .       Plan Factors-Exercise tolerance (METS): >4 METS.    Chart reviewed.   Existing labs reviewed. Patient summary reviewed.                  Induction- intravenous.    Postoperative Plan- Plan for postoperative opioid use.         Informed Consent- Anesthetic plan and risks discussed with patient.

## 2024-08-12 NOTE — OP NOTE
OPERATIVE REPORT  PATIENT NAME: Oswald Marvin    :  1975  MRN: [id number]  Pt Location: [facility] OR ROOM 08    SURGERY DATE: 2024    Surgeons and Role:     * Heydi Gilbert MD - Primary     * Young Obrien MD - Assisting    Preop Diagnosis:  Obesity, unspecified [E66.9]  YURIY on CPAP [G47.33]  Diabetes mellitus (HCC) [E11.9]  Hyperlipemia [E78.5]  Esophageal reflux [K21.9]    Post-Op Diagnosis Codes:     * Obesity, unspecified [E66.9]     * YURIY on CPAP [G47.33]     * Diabetes mellitus (HCC) [E11.9]     * Hyperlipemia [E78.5]     * Esophageal reflux [K21.9]    Procedure(s):  BYPASS GASTRIC R-N-Y  W ROBOTICS & INTRAOP EGD    Specimen(s):  * No specimens in log *    Estimated Blood Loss:   20 ml    Drains:  * No LDAs found *    Anesthesia Type:   General    Operative Indications:  Obesity, unspecified [E66.9]  YURIY on CPAP [G47.33]  Diabetes mellitus (HCC) [E11.9]  Hyperlipemia [E78.5]  Esophageal reflux [K21.9]      Operative Findings:  Hiatal hernia      Complications:   None    Procedure and Technique:  Robotic paraesophageal hernia repair without mesh  Robotic Sybil-en-Y gastric bypass with IntraOp endoscopy   I was present for the entire procedure.    Patient Disposition:  hemodynamically stable    No qualified resident was available to assist assistance was necessary for traction counter-traction and help with stapling and intraoperative endoscopy     Procedure:  The patient was brought to the operating room and placed in a supine position. The patient received a dose of IV antibiotics and a dose of subcutaneous Heparin prior to the procedure. The patient was induced under general endotracheal anesthesia. The abdominal wall was prepped and draped under sterile conditions in the usual fashion. The procedure was started by obtaining access to the abdominal cavity using a Veress needle to the left side of the midline around 6 inches from the xiphoid the abdominal cavity was insufflated with CO2 to a pressure  of 15 mmHg. After that, the abdomen was entered with an 8 mm trocar using an Optiview trocar under direct visualization. At that point, an 8 and 12 mm robotic trocars were placed on the right side of the abdominal wall, under direct visualization, and another 8 mm robotic trocar and 12 mm assistant trocar were placed on the left side of the abdominal wall, also under direct visualization. A TAP block was performed using 20 ml of Exparel mixed with saline and 0.5 % Marcaine for a total of 100 ml. The patient was then placed in a reverse Trendelenburg position. A Darcy retractor was placed through a small stab incision below the xiphoid and was used to retract the left lobe of the liver in a medial fashion, the robot was then docked and locked in place.  An OG tube was placed to decompress the stomach and then removed immediately.   The procedure was started by lifting the omentum in a cephalad fashion. The omentum was then split in half using the vessel sealer. The ligament of Treitz was identified and then the small bowel was transected 60 cm distal to the ligament of Treitz with a 60 mm stapler using a white load.  The mesentery of the small bowel was then transected using the vessel sealer,   After that, the distal small bowel was run for 100 cm in a way to obtain a 100 cm long Sybil limb. At that point, two enterotomies were made in the BP limb and the Sybil limb with hot scissors l, and the jejunojejunostomy was fashioned using a 60 mm stapler with a white load. After firing the stapler, the staple line was inspected and there was no evidence of bleeding and the staple line was well formed. The common enterotomy of the jejunojejunostomy was closed in two layers using 2-0 Vicryl running suture for the first layer and  2-0 V LOC in a running fashion for the second layer. The mesenteric defect of the small bowel was approximated using nonabsorbable suture in a running fashion.  At that point, we turned our  attention to the upper portion of the abdomen.  Upon inspecting the hiatus there was evidence of a paraesophageal hernia.  I started the dissection on the right side by taking the pars flaccida down all the way up to the right cassandra the right cassandra was dissected away from the hernia sac and esophageal wall, the right vagus nerve was clearly identified and kept out of harm's way.  I then turned my attention to the left cassandra, in order to expose left cassandra clearly I mobilized the gastric fundus using the vessel sealer of note the fundus was acutely inflamed and had herniated into the thoracic cavity.  The fundus was reduced and the hernia sac dissected away from the left cassandra and then the plane of dissection around the left cassandra was connected anteriorly with my plane of dissection that was developed previously while dissecting the right cassandra.  The phrenoesophageal ligament was also taken down in its entirety and the hernia sac dissected anteriorly away from the esophageal wall left vagus nerve was identified and preserved.  At that point the hernia sac was completely dissected and excised. The decision was then made to repair the hernia posteriorly. Right cassandra and left cassandra were dissected away from the hernia sac and skeletonized all the way down to the confluence. Esophagus was kept out of harm's way during the dissection. Both vagi were also identified and preserved as previously mentioned.  Dissection was carried all the way up in the thoracic cavity to obtain more length of the esophagus. The dissection was completed circumferentially around the esophagus. We had at least 3 cm of the lower esophagus in an intra-abdominal location by the end of the dissection.   Hernia sac was completely dissected and excised. Right cassandra and left cassandra were then approximated using 0 Ethibond sutures placed in an interrupted fashion.  The pars flaccida was opened and a gastric pouch was created with serial firings of the Sureform 60 mm  intuitive  stapler using the compression feedback to guide the choice of cartridges. After the formation of the gastric pouch, the staple lines of the pouch and the gastric remnant were inspected and appeared to be well formed and also appeared to be hemostatic.  A new gastrojejunostomy anastomosis was then fashioned in an antecolic antegastric fashion in 2 layers. Outer layer was a running layer of 2-0 V lock suture and the inner  layer was a running 2-0 Vicryl suture. Upper endoscopy was then performed and showed no evidence of bubbles or bleeding at the suture line of the anastomosis or the staple line of the gastric pouch.  Antunez space was closed with 0 Ethibond suture, the Darcy liver retractor was removed. The 12 mm port was closed  with 1-0 Vicryl in a simple fashion.   All the skin edges of the trocar sites were approximated with 4-0 Monocryl in a subcuticular inverted fashion. The patient was extubated and transferred to the PACU in stable condition.      SIGNATURE: Heydi Gilbert MD  DATE: August 12, 2024  TIME: 12:54 PM

## 2024-08-12 NOTE — INTERVAL H&P NOTE
H&P reviewed. After examining the patient I find no changes in the patients condition since the H&P had been written.    Vitals:    08/12/24 0755   BP: 118/59   Pulse: 67   Resp: 16   Temp: 97.8 °F (36.6 °C)   SpO2: 96%

## 2024-08-12 NOTE — DISCHARGE INSTRUCTIONS
your medications from [facility] in Hospital Lobby   Crush or cut your pills and open capsules, mix with liquid to drink.  Take Tylenol every 8 hours around the clock, unless instructed otherwise  Take your omeprazole daily  It is important to stay hydrated and follow your discharge diet progression   Mild nausea is ok as long as you can drink fluids, sip very slowly and get up and walk during any periods of nausea  You may shower normally after 48 hours, but do not scrub incision sites, blot gently with clean towel to dry incisions  Take home medications as usual unless instructed otherwise while in hospital  Follow up with Dr. Eligio Gilbert and your PCP within the next week

## 2024-08-12 NOTE — DISCHARGE INSTR - AVS FIRST PAGE
Bariatric/Weight Loss Surgery  Hospital Discharge Instructions  ACTIVITY:  Progress as feels comfortable - a good rule is:  if you are doing something and it begins to hurt, stop doing the activity. Walk every hour while at home.  You may walk stairs if you do so slowly  You may shower 48 hours after surgery.  Do not scrub incision sites.  Blot gently with clean towel to dry incisions. (see #4 below)   Use your incentive spirometer 10 times per hour while awake for 1 week after surgery.  Do NOT drive for 48 hours after surgery. No driving 24 hours after taking certain prescription pain medications. Examples of such medication are Percocet, Darvocet, Oxycodone, Tylenol #3, and Tylenol with Codeine.     DIET  Stay on a liquid diet for 7 days after your surgery date, sipping slowly. Refer to your manual for examples of choices. Remember to keep your liquids sugar free or low calorie. You may have protein drinks. Make sure to drink 48 to 64 ounces per day of fluids.   You may advance to a pureed diet one week after surgery as instructed by your diet progression pamphlet. Once you get approval from your surgeon at your first post operative visit, you may advance to the soft diet and remain on soft diet for 8 weeks unless otherwise instructed.    MEDICATIONS:  The abdominal nerve block will wear off during the first 1-2 days that you are home, and you may become sore (especially over incision site/sites where abdominal wall is sutured). This may create a pulling sensation, especially while moving around, and will fade over time.  Continue to take your Tylenol and your pain medication as instructed.   Start vitamins and minerals one week after surgery or when you start stage 3/puree diet.   Anti-acid Medication as per prescription.  Other medications as indicated on the Physician Patient Discharge Instructions form given to you at the time of discharge.  Make sure that you are splitting your pill or tablet medications in  halves or fourths or even crushing them before you take them. Capsules should be opened and mixed with water or jello. You need to do this for at least 4 weeks after surgery. Eventually you will be able to take your medications the regular way as they were prescribed.   You will need to consult with your Family Doctor in regards to all your prescribed medication, particularly those for blood pressure and diabetes.  As you lose weight, medical conditions may change, requiring an alteration or elimination of the drug dose. Monitor blood pressure closely and call PCP with any concerns.   Sleeve Gastrectomy patients ONLY:  Complete full course of lovenox injections!  DO NOT TAKE BIRTH CONTROL(BC) MEDICATIONS, INSERT BC VAGINAL RINGS, OR PLACE IUD OR ANY OTHER BC METHODS UNTIL 31 DAYS FROM DAY OF DISCHARGE FROM HOSPITAL. THIS PLACES YOU AT HIGH RISK FOR A POTENTIALLY LIFE THREATENING BLOOD CLOT. Remember to always use barrier methods for birth control and speak to your GYN about using two forms of birth control to start 31 days after surgery. It is very important to avoid pregnancy until at least 18-24 months after surgery.     INCISION CARE  You may shower and get incisions wet 2 days after surgery. No soaking tub baths or swimming for 30 days after surgery. Keep abdominal area and incisions clean. Use soap and water to create a good lather and rinse off.  Do not scrub incisions.   If you have a drain, empty the drain as the nurses instructed.    FOLLOW-UP APPOINTMENT should be made for one week after discharge. Call surgeon’s office at [phone] to schedule an appointment.    CALL YOUR DOCTOR FOR:  pain not controlled by pain medications, a temperature greater than 101.5° F, any increase or change in drainage or redness from any incision, any vomiting or inability to keep liquids down, shortness of breath, shoulder pain, or bleeding

## 2024-08-13 VITALS
TEMPERATURE: 97.3 F | HEART RATE: 65 BPM | RESPIRATION RATE: 16 BRPM | SYSTOLIC BLOOD PRESSURE: 116 MMHG | WEIGHT: 256.62 LBS | DIASTOLIC BLOOD PRESSURE: 68 MMHG | BODY MASS INDEX: 38.01 KG/M2 | HEIGHT: 69 IN | OXYGEN SATURATION: 95 %

## 2024-08-13 LAB
ANION GAP SERPL CALCULATED.3IONS-SCNC: 7 MMOL/L (ref 4–13)
BUN SERPL-MCNC: 14 MG/DL (ref 5–25)
CALCIUM SERPL-MCNC: 9.1 MG/DL (ref 8.4–10.2)
CHLORIDE SERPL-SCNC: 103 MMOL/L (ref 96–108)
CO2 SERPL-SCNC: 27 MMOL/L (ref 21–32)
CREAT SERPL-MCNC: 0.74 MG/DL (ref 0.6–1.3)
ERYTHROCYTE [DISTWIDTH] IN BLOOD BY AUTOMATED COUNT: 13.2 % (ref 11.6–15.1)
GFR SERPL CREATININE-BSD FRML MDRD: 109 ML/MIN/1.73SQ M
GLUCOSE SERPL-MCNC: 153 MG/DL (ref 65–140)
HCT VFR BLD AUTO: 37.1 % (ref 36.5–49.3)
HGB BLD-MCNC: 12.2 G/DL (ref 12–17)
HGB BLD-MCNC: 13.4 G/DL (ref 12–17)
MCH RBC QN AUTO: 28.4 PG (ref 26.8–34.3)
MCHC RBC AUTO-ENTMCNC: 32.9 G/DL (ref 31.4–37.4)
MCV RBC AUTO: 87 FL (ref 82–98)
PLATELET # BLD AUTO: 294 THOUSANDS/UL (ref 149–390)
PMV BLD AUTO: 10.2 FL (ref 8.9–12.7)
POTASSIUM SERPL-SCNC: 4.4 MMOL/L (ref 3.5–5.3)
RBC # BLD AUTO: 4.29 MILLION/UL (ref 3.88–5.62)
SODIUM SERPL-SCNC: 137 MMOL/L (ref 135–147)
WBC # BLD AUTO: 11.76 THOUSAND/UL (ref 4.31–10.16)

## 2024-08-13 PROCEDURE — 99024 POSTOP FOLLOW-UP VISIT: CPT | Performed by: STUDENT IN AN ORGANIZED HEALTH CARE EDUCATION/TRAINING PROGRAM

## 2024-08-13 PROCEDURE — NC001 PR NO CHARGE: Performed by: STUDENT IN AN ORGANIZED HEALTH CARE EDUCATION/TRAINING PROGRAM

## 2024-08-13 PROCEDURE — 85027 COMPLETE CBC AUTOMATED: CPT | Performed by: STUDENT IN AN ORGANIZED HEALTH CARE EDUCATION/TRAINING PROGRAM

## 2024-08-13 PROCEDURE — 80048 BASIC METABOLIC PNL TOTAL CA: CPT | Performed by: STUDENT IN AN ORGANIZED HEALTH CARE EDUCATION/TRAINING PROGRAM

## 2024-08-13 PROCEDURE — 85018 HEMOGLOBIN: CPT | Performed by: STUDENT IN AN ORGANIZED HEALTH CARE EDUCATION/TRAINING PROGRAM

## 2024-08-13 RX ORDER — VENLAFAXINE 37.5 MG/1
37.5 TABLET ORAL 2 TIMES DAILY
Qty: 14 TABLET | Refills: 0 | Status: SHIPPED | OUTPATIENT
Start: 2024-08-13 | End: 2024-08-20

## 2024-08-13 RX ORDER — ACETAMINOPHEN 160 MG/5ML
975 LIQUID ORAL EVERY 8 HOURS
Qty: 640.5 ML | Refills: 0 | Status: SHIPPED | OUTPATIENT
Start: 2024-08-13 | End: 2024-08-20

## 2024-08-13 RX ADMIN — ACETAMINOPHEN 1000 MG: 10 INJECTION INTRAVENOUS at 05:17

## 2024-08-13 RX ADMIN — SIMETHICONE 80 MG: 80 TABLET, CHEWABLE ORAL at 00:25

## 2024-08-13 RX ADMIN — KETOROLAC TROMETHAMINE 15 MG: 30 INJECTION, SOLUTION INTRAMUSCULAR; INTRAVENOUS at 11:54

## 2024-08-13 RX ADMIN — FAMOTIDINE 20 MG: 10 INJECTION, SOLUTION INTRAVENOUS at 09:04

## 2024-08-13 RX ADMIN — ATORVASTATIN CALCIUM 20 MG: 20 TABLET, FILM COATED ORAL at 09:04

## 2024-08-13 RX ADMIN — KETOROLAC TROMETHAMINE 15 MG: 30 INJECTION, SOLUTION INTRAMUSCULAR; INTRAVENOUS at 05:17

## 2024-08-13 RX ADMIN — ACETAMINOPHEN 1000 MG: 10 INJECTION INTRAVENOUS at 11:54

## 2024-08-13 NOTE — PROGRESS NOTES
"Progress Note - Bariatric Surgery   Oswald Marvin 49 y.o. male MRN: [id number]  Unit/Bed#: E5 -01 Encounter: [id number]      Subjective/Objective     Subjective:  Patient with BMI 37.90 POD1 s/p RYGB HHR wo mesh.  Patient denies fevers, chills, sweats, SOB, CP, calf pain.  Pain adequately controlled on oral pain medication.  Ambulating without assistance, voiding well, and using incentive spirometer.  Patient tolerating liquid diet without nausea or vomiting today.  Vital signs stable.  CBC today shows 2 pt Hgb drop.  BMP obtained today nl.  Using CPAP.  Upper GI not needed    Objective:    /62 (BP Location: Right arm)   Pulse 65   Temp (!) 97.4 °F (36.3 °C) (Axillary)   Resp 16   Ht 5' 9\" (1.753 m)   Wt 116 kg (256 lb 9.9 oz)   SpO2 95%   BMI 37.90 kg/m²       Intake/Output Summary (Last 24 hours) at 8/13/2024 0727  Last data filed at 8/13/2024 0327  Gross per 24 hour   Intake 2900 ml   Output 1320 ml   Net 1580 ml       Invasive Devices       Peripheral Intravenous Line  Duration             Peripheral IV 08/12/24 Dorsal (posterior);Left Hand <1 day                    ROS: 10-point system completed. All negative except see HPI.    Physical Exam    General Appearance:    Alert, cooperative, no distress, appears stated age   Head:    Normocephalic, without obvious abnormality, atraumatic   Lungs:     Respirations unlabored   Heart:    Regular rate and rhythm   Abdomen:     Soft, appropriate tenderness, no masses, no organomegaly, non-distended   Extremities:   Extremities normal, atraumatic, no cyanosis or edema   Neurologic:  Incision:  Psych:   Normal strength and sensation    Clean, dry, and intact, not bleeding, no drain    Normal mood and affect       Lab, Imaging and other studies:I have personally reviewed pertinent lab results.       VTE Mechanical Prophylaxis: sequential compression device    Assessment/Plan  1)  Patient with BMI 37 Obesity s/p RYGB w/ HHR wo Mesh with stable post op " course.  Patient afebrile and hemodynamically stable.     - Encourage PO fluids   - Recommend ambulation, use of SCDs when not ambulating, and incentive spirometry.   - Pending repeat labs  - Plan to D/C patient home today pending anticipated progression    Plan of care was discussed with patient.  Care plan discussed with Dr. Eligio Obrien MD  Bariatric Surgery  8/13/2024  7:27 AM

## 2024-08-13 NOTE — PROGRESS NOTES
Discharge instructions reviewed with patient. All questions answered. Scripts called to [facility]. IV & felipe removed. Pt walked downstairs with all belongings.

## 2024-08-13 NOTE — PROGRESS NOTES
Pt reported that he took his home medications: metformin, prilosec & effexor XR. I asked pt if he took them all at once and he said yes. I educated the patient that he should not be taking pills all at once and he should not be taking his home meds. Dr. Obrien notified. Per Dr. Obrien, give pepcid.

## 2024-08-13 NOTE — UTILIZATION REVIEW
Initial Clinical Review    Elective  Ip   surgical procedure    Age/Sex: 49 y.o. male    Surgery Date:   8/12    Procedure: BYPASS GASTRIC R-N-Y  W ROBOTICS & INTRAOP EGD       Anesthesia:  general    Operative Findings:  Hiatal hernia    POD#1 Progress Note:   8/13   Continue post op care.   Continue pain control/antiemetics as needed.  Monitor labs.  Hemoglobin today  12.2.      Admission Orders: Date/Time/Statement:   Admission Orders (From admission, onward)       Ordered        08/12/24 1504  Inpatient Admission  Once                          Orders Placed This Encounter   Procedures    Inpatient Admission     Standing Status:   Standing     Number of Occurrences:   1     Order Specific Question:   Level of Care     Answer:   Med Surg [16]     Order Specific Question:   Bed Type     Answer:   Bariatric [1]     Order Specific Question:   Estimated length of stay     Answer:   Inpatient Only Surgery       Vital Signs (last 3 days)       Date/Time Temp Pulse Resp BP MAP (mmHg) SpO2 Calculated FIO2 (%) - Nasal Cannula Nasal Cannula O2 Flow Rate (L/min) O2 Device Patient Position - Orthostatic VS Pain    08/13/24 0903 -- -- -- -- -- -- -- -- -- -- No Pain    08/13/24 07:51:30 97.3 °F (36.3 °C) -- 16 116/68 84 -- -- -- None (Room air) Sitting --    08/13/24 0517 -- -- -- -- -- -- -- -- -- -- Med Not Given for Pain - for MAR use only    08/13/24 03:24:01 97.4 °F (36.3 °C) 65 16 106/62 77 95 % -- -- BiPAP Sitting --    08/12/24 2323 -- -- -- -- -- -- -- -- -- -- Med Not Given for Pain - for MAR use only    08/12/24 22:25:45 98.1 °F (36.7 °C) 64 -- 116/61 79 95 % -- -- -- -- --    08/12/24 1942 -- -- -- -- -- -- -- -- -- -- 7    08/12/24 18:57:44 97.8 °F (36.6 °C) 65 -- 112/55 74 98 % -- -- -- -- --    08/12/24 1718 -- -- -- -- -- -- -- -- -- -- 8 08/12/24 17:06:34 -- 68 -- 115/69 84 98 % -- -- -- -- --    08/12/24 1550 -- -- -- -- -- -- -- -- -- -- 7 08/12/24 15:35:28 -- 71 -- 100/65 77 95 % -- -- -- -- --     08/12/24 1529 -- -- -- -- -- -- -- -- -- -- 7    08/12/24 15:03:38 97.6 °F (36.4 °C) 79 16 107/62 77 92 % -- -- None (Room air) Sitting --    08/12/24 1440 -- -- -- -- -- 94 % -- -- -- -- --    08/12/24 1435 -- 84 12 119/58 80 98 % -- -- None (Room air) -- 5    08/12/24 1420 -- 72 12 99/53 74 99 % -- -- None (Room air) -- --    08/12/24 1419 -- -- -- -- -- -- -- -- -- -- 8    08/12/24 1405 -- 70 12 100/55 74 96 % 28 2 L/min Nasal cannula -- No Pain    08/12/24 1350 97.8 °F (36.6 °C) 78 14 111/59 79 97 % -- -- None (Room air) -- No Pain    08/12/24 1335 -- 82 16 109/55 79 99 % -- -- None (Room air) -- No Pain    08/12/24 1320 98.1 °F (36.7 °C) 96 16 121/58 83 97 % -- -- None (Room air) -- No Pain    08/12/24 0855 -- -- -- -- -- -- -- -- -- -- No Pain    08/12/24 0759 -- -- -- -- -- -- -- -- -- -- Med Not Given for Pain - for MAR use only    08/12/24 0755 97.8 °F (36.6 °C) 67 16 118/59 -- 96 % -- -- None (Room air) -- --    08/12/24 0753 -- -- -- -- -- -- -- -- -- -- No Pain          Weight (last 2 days)       Date/Time Weight    08/12/24 0753 116 (256.62)            Pertinent Labs/Diagnostic Test Results:   Radiology:      Results from last 7 days   Lab Units 08/13/24  0521   WBC Thousand/uL 11.76*   HEMOGLOBIN g/dL 12.2   HEMATOCRIT % 37.1   PLATELETS Thousands/uL 294         Results from last 7 days   Lab Units 08/13/24  0521   SODIUM mmol/L 137   POTASSIUM mmol/L 4.4   CHLORIDE mmol/L 103   CO2 mmol/L 27   ANION GAP mmol/L 7   BUN mg/dL 14   CREATININE mg/dL 0.74   EGFR ml/min/1.73sq m 109   CALCIUM mg/dL 9.1         Results from last 7 days   Lab Units 08/12/24  1329 08/12/24  0906 08/12/24  0809   POC GLUCOSE mg/dl 161* 218* 284*     Results from last 7 days   Lab Units 08/13/24  0521   GLUCOSE RANDOM mg/dL 153*             Diet:  bariatric cl liq    Mobility:   OOB  as  tolerated    DVT Prophylaxis:  SCD's    Medications/Pain Control:   Scheduled Medications:  acetaminophen, 1,000 mg, Intravenous, Q6H  MERCEDES  atorvastatin, 20 mg, Oral, Daily  famotidine, 20 mg, Intravenous, BID  ketorolac, 15 mg, Intravenous, Q6H MERCEDES  venlafaxine, 75 mg, Oral, Daily      Continuous IV Infusions:  lactated ringers, 100 mL/hr, Intravenous, Continuous  sodium chloride, 125 mL/hr, Intravenous, Continuous      PRN Meds:  diphenhydrAMINE, 25 mg, Oral, Q8H PRN  metoclopramide, 10 mg, Intravenous, Q6H PRN  morphine injection, 4 mg, Intravenous, Q4H PRN  ondansetron, 4 mg, Intravenous, Q6H PRN  oxyCODONE, 10 mg, Oral, Q4H PRN  oxyCODONE, 5 mg, Oral, Q4H PRN  phenol, 2 spray, Mouth/Throat, Q2H PRN  promethazine, 25 mg, Intramuscular, Q6H PRN  simethicone, 80 mg, Oral, 4x Daily PRN        Network Utilization Review Department  ATTENTION: Please call with any questions or concerns to [phone] and carefully listen to the prompts so that you are directed to the right person. All voicemails are confidential.   For Discharge needs, contact Care Management DC Support Team at [phone] opt. [phone]  Send all requests for admission clinical reviews, approved or denied determinations and any other requests to dedicated fax number below belonging to the campus where the patient is receiving treatment. List of dedicated fax numbers for the Facilities:  FACILITY NAME UR FAX NUMBER   ADMISSION DENIALS (Administrative/Medical Necessity) [phone]   DISCHARGE SUPPORT TEAM (NETWORK) [phone]   PARENT CHILD HEALTH (Maternity/NICU/Pediatrics) [phone]   [facility] [phone]   [facility] [phone]   Critical access hospital [phone]   [facility] [phone]   [facility] [phone]   [facility] [phone]   [facility] [phone]   [facility] [phone]   [facility] [phone]   [facility]  [facility] [phone]   [facility] [phone]   [facility] [phone]

## 2024-08-13 NOTE — PLAN OF CARE
Problem: PAIN - ADULT  Goal: Verbalizes/displays adequate comfort level or baseline comfort level  Description: Interventions:  - Encourage patient to monitor pain and request assistance  - Assess pain using appropriate pain scale  - Administer analgesics based on type and severity of pain and evaluate response  - Implement non-pharmacological measures as appropriate and evaluate response  - Consider cultural and social influences on pain and pain management  - Notify physician/advanced practitioner if interventions unsuccessful or patient reports new pain  Outcome: Progressing     Problem: INFECTION - ADULT  Goal: Absence or prevention of progression during hospitalization  Description: INTERVENTIONS:  - Assess and monitor for signs and symptoms of infection  - Monitor lab/diagnostic results  - Monitor all insertion sites, i.e. indwelling lines, tubes, and drains  - Monitor endotracheal if appropriate and nasal secretions for changes in amount and color  - [place] appropriate cooling/warming therapies per order  - Administer medications as ordered  - Instruct and encourage patient and family to use good hand hygiene technique  - Identify and instruct in appropriate isolation precautions for identified infection/condition  Outcome: Progressing  Goal: Absence of fever/infection during neutropenic period  Description: INTERVENTIONS:  - Monitor WBC    Outcome: Progressing     Problem: SAFETY ADULT  Goal: Patient will remain free of falls  Description: INTERVENTIONS:  - Educate patient/family on patient safety including physical limitations  - Instruct patient to call for assistance with activity   - Consult OT/PT to assist with strengthening/mobility   - Keep Call bell within reach  - Keep bed low and locked with side rails adjusted as appropriate  - Keep care items and personal belongings within reach  - Initiate and maintain comfort rounds  - Make Fall Risk Sign visible to staff  - Offer Toileting every Hours, in  advance of need  - Initiate/Maintain alarm  - Obtain necessary fall risk management equipment:   - Apply yellow socks and bracelet for high fall risk patients  - Consider moving patient to room near nurses station  Outcome: Progressing  Goal: Maintain or return to baseline ADL function  Description: INTERVENTIONS:  -  Assess patient's ability to carry out ADLs; assess patient's baseline for ADL function and identify physical deficits which impact ability to perform ADLs (bathing, care of mouth/teeth, toileting, grooming, dressing, etc.)  - Assess/evaluate cause of self-care deficits   - Assess range of motion  - Assess patient's mobility; develop plan if impaired  - Assess patient's need for assistive devices and provide as appropriate  - Encourage maximum independence but intervene and supervise when necessary  - Involve family in performance of ADLs  - Assess for home care needs following discharge   - Consider OT consult to assist with ADL evaluation and planning for discharge  - Provide patient education as appropriate  Outcome: Progressing  Goal: Maintains/Returns to pre admission functional level  Description: INTERVENTIONS:  - Perform AM-PAC 6 Click Basic Mobility/ Daily Activity assessment daily.  - Set and communicate daily mobility goal to care team and patient/family/caregiver.   - Collaborate with rehabilitation services on mobility goals if consulted  - Perform Range of Motion  times a day.  - Reposition patient every  hours.  - Dangle patient  times a day  - Stand patient  times a day  - Ambulate patient  times a day  - Out of bed to chair  times a day   - Out of bed for meals  times a day  - Out of bed for toileting  - Record patient progress and toleration of activity level   Outcome: Progressing     Problem: DISCHARGE PLANNING  Goal: Discharge to home or other facility with appropriate resources  Description: INTERVENTIONS:  - Identify barriers to discharge w/patient and caregiver  - Arrange for  needed discharge resources and transportation as appropriate  - Identify discharge learning needs (meds, wound care, etc.)  - Arrange for interpretive services to assist at discharge as needed  - Refer to Case Management Department for coordinating discharge planning if the patient needs post-hospital services based on physician/advanced practitioner order or complex needs related to functional status, cognitive ability, or social support system  Outcome: Progressing     Problem: Knowledge Deficit  Goal: Patient/family/caregiver demonstrates understanding of disease process, treatment plan, medications, and discharge instructions  Description: Complete learning assessment and assess knowledge base.  Interventions:  - Provide teaching at level of understanding  - Provide teaching via preferred learning methods  Outcome: Progressing

## 2024-08-14 ENCOUNTER — TRANSITIONAL CARE MANAGEMENT (OUTPATIENT)
Dept: FAMILY MEDICINE CLINIC | Facility: CLINIC | Age: 49
End: 2024-08-14

## 2024-08-14 ENCOUNTER — TELEPHONE (OUTPATIENT)
Age: 49
End: 2024-08-14

## 2024-08-14 ENCOUNTER — TELEPHONE (OUTPATIENT)
Dept: MEDSURG UNIT | Facility: HOSPITAL | Age: 49
End: 2024-08-14

## 2024-08-14 NOTE — DISCHARGE SUMMARY
Discharge Summary - Oswald Marvin 49 y.o. male MRN: [id number]    Unit/Bed#: E5 -01 Encounter: [id number]      Pre-Operative Diagnosis: Pre-Op Diagnosis Codes:      * Obesity, unspecified [E66.9]     * YURIY on CPAP [G47.33]     * Diabetes mellitus (HCC) [E11.9]     * Hyperlipemia [E78.5]     * Esophageal reflux [K21.9]    Post-Operative Diagnosis: Post-Op Diagnosis Codes:     * Obesity, unspecified [E66.9]     * YURIY on CPAP [G47.33]     * Diabetes mellitus (HCC) [E11.9]     * Hyperlipemia [E78.5]     * Esophageal reflux [K21.9]    Procedures Performed:  Procedure(s):  BYPASS GASTRIC R-N-Y  W ROBOTICS & INTRAOP EGD,PARAESOPHAGEAL HERNIA REPAIR    Surgeon: Heydi Gilbert MD    See H & P for full details of admission and Operative Note for full details of operations performed.     Patient tolerated surgery well without complications. In the morning postoperative Day 1, the patient had mild nausea and abdominal pain. Tolerated a clear liquid diet without vomiting. Able to ambulate and voiding independently. Patient was deemed ready for discharge home not on lovenox.    Patient was seen and examined prior to discharge.      Provisions for Follow-Up Care:  See After Visit Summary/Discharge Instructions for information related to follow-up care and home orders.      Disposition: Home, in stable condition.     Planned Readmission: No    Discharge Medications:  See After Visit Summary/Discharge Instructions for reconciled discharge medications provided to patient and family.      Post Operative instructions: Reviewed with patient and/or family.    Signature:   Young Obrien MD  Date: 8/14/2024 Time: 7:34 AM      normal...

## 2024-08-14 NOTE — PROGRESS NOTES
Note: Inpatient class was intended from 8/12/24@1504, which was canceled in error.     Young Obrien MD

## 2024-08-14 NOTE — TELEPHONE ENCOUNTER
Pt returning callback to schedule tcm appt, discharged from hospital on 8/13. Unable to connect to practice. Please call back at [phone]. Thank you.

## 2024-08-14 NOTE — TELEPHONE ENCOUNTER
Post op follow up phone call completed.  Pt is sipping liquids and has consumed about 48 oz so far today.  Using IS as instructed, reinforced importance of using IS to help prevent pneumonia. Ambulating about home without difficulty.  Pain controlled with analgesia, only taking tylenol.  Reaffirmed examples of liquid diet over the next week.  Started miralax, passing gas, no BM yet.  Pt stated understanding about discharge instructions and medication adjustments.  Follow up appt with surgeon scheduled for next week.   Instructed to call with any additional questions or concerns.

## 2024-08-14 NOTE — UTILIZATION REVIEW
NOTIFICATION OF ADMISSION DISCHARGE   This is a Notification of Discharge from [facility]. Please be advised that this patient has been discharge from our facility. Below you will find the admission and discharge date and time including the patient’s disposition.   UTILIZATION REVIEW CONTACT:  Susi Spain  Utilization   Network Utilization Review Department  Phone: [phone] x carefully listen to the prompts. All voicemails are confidential.  Email: [email or web address]     ADMISSION INFORMATION  PRESENTATION DATE: 8/12/2024  7:24 AM  OBERVATION ADMISSION DATE: N/A  INPATIENT ADMISSION DATE: 8/12/24  3:05 PM   DISCHARGE DATE: 8/13/2024  4:03 PM   DISPOSITION:Home/Self Care    Network Utilization Review Department  ATTENTION: Please call with any questions or concerns to [phone] and carefully listen to the prompts so that you are directed to the right person. All voicemails are confidential.   For Discharge needs, contact Care Management DC Support Team at [phone] opt. [phone]  Send all requests for admission clinical reviews, approved or denied determinations and any other requests to dedicated fax number below belonging to the campus where the patient is receiving treatment. List of dedicated fax numbers for the Facilities:  FACILITY NAME UR FAX NUMBER   ADMISSION DENIALS (Administrative/Medical Necessity) [phone]   DISCHARGE SUPPORT TEAM ([facility]) [phone]   PARENT CHILD HEALTH (Maternity/NICU/Pediatrics) [phone]   [facility] [phone]   [facility] [phone]   [facility] [phone]   [facility] [phone]   [facility] [phone]   [facility] [phone]   [facility] [phone]   [facility] [phone]  WOUND CENTER Consult Note/Progress Note:  
Kay Cruz  MRN: [id number]  TTC:7/9/7150  Age:72 y.o. 
 
HPI: Kay Cruz is a 81 y.o. female who is a 81 y.o. female \"with extensive medical history as noted below on chronic coumadin who initially presented to ER 9/7 after fall in bathroom 8/28 where she landed directly on her left knee with complaints of dizziness and fatigue and found to have Hgb of 6.7.  She was transfused to Hgb of 9.6.  She also was found with a large hematoma/bullae over her left knee and INR of 4.3.  She underwent an I+D per Dr Noa Cutler on 9/11 with a large amount of serosanguinous fluid evacuated. America Ulloa has been followed by home health PT, OT and wound care since discharge from rehab on ninth floor 9/11-9/21. America Ulloa has done well except the wound has not closed.  Two days ago, [facility] RN and daughter who is an RN noted mild erythema and heat surrounding upper half of wound.  Open area approx 5-6 cm in circumference.  No purulent drainage, oozing small amounts of dark blood with slightly foul odor.  Full ROM to knee.  All distal circulation, motion and sensation WNL for patient.  No distal edema, mild local edema.  Multiple old scars from prior lower extremity wounds.  Patient denies pain to area, fever, systemic symptoms.  She does not appear septic and lactic acid is 1.8.  WBC is 11.6 without shift.  Wound cultured in ER prior to administration of antibiotics.  Begun on vancomycin and ceftriaxone until cultures resulted. Jaqueline Glaser, attentive family at bedside. Creatinine 1.48 on admission with baseline of 1.2.  Of note, patient reports diarrhea x 4 days without use of antibiotics, additional GI symptoms. \"   
  
  
10/7/18: Pt sitting up on side of the bed just finishing breakfast. No complaints. AF, NAD. WBC 8.8, Pt taking Coumadin 2.5mg qhs. INR 3.0. 
10/8/18: POD1 s/p left knee debridement; awake in bed, no complaints. AF, VSS. Cx growing P. Mirabilis.   [facility] [phone]   [facility] [phone]   [facility] [phone]   [facility] [phone]          10/9/18: POD2 s/p left knee debridement; awake in chair, no complaints. AF, VSS. Cx growing P. Mirabilis. Vac placed today and functioning without leak 10/10/18: POD3 s/p left knee debridement; awake in chair, no complaints. AF, VSS. Cx growing P. Mirabilis. Vac functioning without leak or bleeding This information was obtained from the patient The following HPI elements were documented for the patient's wound: 
Location: L below knee anterior Duration: 8/28/2018 Severity:  No pain Context:  Fall in bathroom on coumadin, secondary infection Modifying Factors:  Nothing makes better or worse. Improved with debridement on 10/7/2018 and VAC Quality:  n/a Timing:  n/a Associated Signs and Symptoms: 
 
 
 
Past Medical History:  
Diagnosis Date  Anticoagulated on Coumadin 7/9/2013 S/P AVR  CAD (coronary artery disease) 1/20/2013 5/8/14 PCI LAD with stent placed  Cardiomyopathy ([address]  CHF (congestive heart failure) ([address] 2/17/2017  CKD (chronic kidney disease) stage 3, GFR 30-59 ml/min ([facility]) 7/10/2013  COPD (chronic obstructive pulmonary disease) ([address] 4/2/2014  Diabetes ([address]  Essential hypertension, benign 1/20/2013  HLD (hyperlipidemia)  ICD (implantable cardioverter-defibrillator) in place 10/2/2014 Biotronik single-chamber ICD implantation 10/20/14  Iron deficiency anemia due to chronic blood loss 7/29/2009  MDS (myelodysplastic syndrome) ([address] 12/17/2011 Procrit started in August, 2011 12/18/11 Procrit weekly and Iron stores. 5-12 12-13-12 good response to 3 weekly procrit did not need it last time  3-7-13 Pt doing well. Just wanted a \"check-up. \" Responding to Procrit every three weeks. 8-29-13 patient has missed some injections on recently restarted hemoglobin only issue , takes oral iron iron stores each time  REJI (obstructive sleep apnea)-cpap 4/2/2014  Osteoarthritis  Osteopenia  Pulmonary hypertension ([address] 6/15/2016  Quadrantanopia  Skin defect 2018  Visual complaint 2015 Past Surgical History:  
Procedure Laterality Date [address]    HX AORTIC VALVE REPLACEMENT  ,   
 mechanical valve   HX  SECTION    
 HX CORONARY STENT PLACEMENT  May, 2014 STEMI with Stent placement.  HX ENDOSCOPY  2009 EGD [address] CATHETERIZATION    HX PACEMAKER  10/2/2014 Biotronik ICD  HX TUBAL LIGATION    
 IR BX BONE MARROW DIAGNOSTIC  2011 Current Outpatient Medications Medication Sig  carvedilol (COREG) 6.25 mg tablet Take 1 Tab by mouth two (2) times daily (with meals).  traMADol (ULTRAM) 50 mg tablet Take 1 Tab by mouth every six (6) hours as needed for Pain. Max Daily Amount: 200 mg.  cholecalciferol, vitamin D3, (VITAMIN D3) 2,000 unit tab Take 2,000 Units by mouth daily.  warfarin (COUMADIN) 5 mg tablet Take 5 mg by mouth every Monday, Wednesday, Friday. Take as directed: 
take one 5mg tablet on Monday, Wednesday, Friday 
take 2.5mg (half of 5mg tablet) on all other days , Thurs, Sat, Sun  
 spironolactone (ALDACTONE) 25 mg tablet Take 25 mg by mouth daily.  nitroglycerin (NITROSTAT) 0.4 mg SL tablet 1 Tab by SubLINGual route every five (5) minutes as needed for Chest Pain.  sertraline (ZOLOFT) 50 mg tablet Take 1 Tab by mouth daily.  simvastatin (ZOCOR) 20 mg tablet Take 1 Tab by mouth nightly.  sacubitril-valsartan (ENTRESTO) 24 mg/26 mg tablet Take 1 Tab by mouth two (2) times a day.  fluticasone-vilanterol (BREO ELLIPTA) 200-25 mcg/dose inhaler Take 1 Puff by inhalation daily.  OTHER Trilogy  mometasone-formoterol (DULERA) 200-5 mcg/actuation HFA inhaler 2 puffs bid, rinse mouth after use. (Patient taking differently: Take 2 Puffs by inhalation two (2) times daily as needed.)  isosorbide mononitrate ER (IMDUR) 30 mg tablet Take 30 mg by mouth daily.  aspirin delayed-release 81 mg tablet Take 81 mg by mouth daily.  warfarin (COUMADIN) 2.5 mg tablet Take 2.5 mg by mouth nightly. Needs 5mg on Friday  docusate sodium (COLACE) 50 mg capsule Take 50 mg by mouth daily.  furosemide (LASIX) 40 mg tablet Take 1 Tab by mouth daily.  traZODone (DESYREL) 50 mg tablet Take 0.5-1 Tabs by mouth nightly. (Patient taking differently: Take 25-50 mg by mouth as needed.)  multivit-minerals/folic acid (ADULT ONE DAILY MULTIVITAMIN PO) Take 1 Tab by mouth daily.  ascorbic acid, vitamin C, (VITAMIN C) 500 mg tablet Take 500 mg by mouth daily.  ferrous gluconate 324 mg (38 mg iron) tablet TAKE 1 TAB BY MOUTH DAILY (BEFORE BREAKFAST). INDICATIONS: IRON DEFICIENCY ANEMIA (Patient taking differently: Take 325 mg by mouth two (2) times daily (with meals). )  fluticasone (FLONASE) 50 mcg/actuation nasal spray 2 Sprays by Both Nostrils route daily as needed. (Patient taking differently: 2 Sprays by Both Nostrils route daily as needed for Allergies.)  loratadine (CLARITIN) 10 mg tablet TAKE 1 TAB BY MOUTH DAILY. (Patient taking differently: TAKE 1 TAB BY MOUTH DAILY PRN)  albuterol (PROVENTIL VENTOLIN) 2.5 mg /3 mL (0.083 %) nebulizer solution 3 mL by Nebulization route every four (4) hours as needed for Wheezing.  albuterol (VENTOLIN HFA) 90 mcg/actuation inhaler 2 puffs qid prn (Patient taking differently: Take 2 Puffs by inhalation every six (6) hours as needed for Wheezing or Shortness of Breath.)  acetaminophen (TYLENOL) 325 mg tablet Take 650 mg by mouth every four (4) hours as needed for Pain.  OXYGEN-AIR DELIVERY SYSTEMS 3 L by Nasal route continuous. No current facility-administered medications for this encounter. ALLERGIES: Sulfa (sulfonamide antibiotics) and Aspirin Social History Socioeconomic History  Marital status:  Spouse name: Not on file  Number of children: Not on file  Years of education: Not on file  Highest education level: Not on file Occupational History Employer: RETIRED Comment: [employer] Tobacco Use  Smoking status: Former Smoker Packs/day: 0.25 Years: 42.00 Pack years: 10.50 Types: Cigarettes Start date: 10/1/1956 Last attempt to quit: 2014 Years since quittin.7  Smokeless tobacco: Never Used Substance and Sexual Activity  Alcohol use: No  
  Alcohol/week: 0.0 oz  Drug use: No  
Other Topics Concern  Weight Concern Yes  Special Diet Yes Social History Narrative Lives with her daughter. Floyd Augustine and Liz Ramesh are caregivers. Ambulates only short distances. Social History Tobacco Use Smoking Status Former Smoker  Packs/day: 0.25  
 Years: 42.00  
 Pack years: 10.50  Types: Cigarettes  Start date: 10/1/1956  Last attempt to quit: 2014  Years since quittin.7 Smokeless Tobacco Never Used Family History Problem Relation Age of Onset  Heart Disease Mother CHF  Hypertension Mother  Kidney Disease Mother  Heart Disease Father CHF  Lung Disease Father  Diabetes Father  Cancer Father   
     prostate  Hypertension Father  Heart Attack Father  Heart Disease Maternal Aunt  Diabetes Brother  Coronary Artery Disease Other  Breast Cancer Neg Hx   
 
 
ROS: The patient has no difficulty with chest pain or shortness of breath. No fever or chills. Comprehensive review of systems was otherwise unremarkable except as noted above. Physical Exam:  
Visit Vitals /77 (BP 1 Location: Left arm) Pulse 73 Temp 99 °F (37.2 °C) Resp 18 Ht 5' 2\" (1.575 m) Wt 218 lb 9.6 oz (99.2 kg) SpO2 95% BMI 39.98 kg/m² Constitutional: Alert, oriented, cooperative patient in no acute distress; appears stated age;  General appearance is within normal limits for wound care patient population. See the today's recorded vitals signs and constitutional data. Eyes: Pupils equal; Sclera are clear. EOMs intact; The eyes appear to track and move normally. The sclera are not injected. The conjunctive are clear. The eyelids are normal. There is no scleral icterus. ENMT: There are no obvious external ear, nose, lip or mouth lesions. Nares normal; Neck: Overall contour of the neck is normal with no obvious neck masses. Gross hearing is within normal limits. No obvious neck masses CV: RRR;  no JVD; No evidence of cyanosis of the upper extremities. The extremities are perfused without embolic sign, splinter hemorrhages, or petechia. Resp:  Breathing is non-labored; normal rate and effort; no audible wheezing. GI: soft and non-distended without acute abnormality noted. Musculoskeletal: unremarkable with normal function. No embolic signs or cyanosis. Neuro:  Oriented; moves all 4; no focal deficits Psychiatric: Judgement and insight are within normal limits for the wound care population of patients. Patient is oriented to person, place, and time. Recent and remote memory are within normal limits. Mood and affect are within normal limits. Integumentary (Skin/Wounds) See inspection of wound(s) below. There are no other skin areas of palpable concern. See wound center documentation including photos in the [address] Wound Template made part of this record by reference. Wounds: 
Wound Knee Anterior; Left (Active) Dressing Status  Clean, dry, and intact 2/7/2019  2:33 PM  
Dressing Type  Negative pressure wound therapy 12/6/2018  3:57 PM  
Non-Pressure Injury Full thickness (subcut/muscle) 12/13/2018  4:45 PM  
Wound Length (cm) 2 cm 2/7/2019  2:33 PM  
Wound Width (cm) 5 cm 2/7/2019  2:33 PM  
Wound Depth (cm) 0.1 2/7/2019  2:33 PM  
Wound Surface area (cm^2) 10 cm^2 2/7/2019  2:33 PM  
Change in Wound Size % 82.23 2/7/2019  2:33 PM  
 Condition of Base Granulation 2/7/2019  2:33 PM  
Condition of Edges Open 11/1/2018  1:43 PM  
Tissue Type Red 2/7/2019  2:33 PM  
Tissue Type Percent Red 100 2/7/2019  2:33 PM  
Drainage Amount  Moderate 2/7/2019  2:33 PM  
Drainage Color Sanguinous 2/7/2019  2:33 PM  
Wound Odor None 2/7/2019  2:33 PM  
Periwound Skin Condition Intact 2/7/2019  2:33 PM  
Cleansing and Cleansing Agents  Normal saline 2/7/2019  2:33 PM  
Procedure Tolerated Well 12/13/2018  4:45 PM  
Number of days: 833 [REMOVED] Wound Knee Left (Removed) Number of days: 29  
   
[REMOVED] Wound (Removed) Number of days: 6 [REMOVED] Wound Leg Lower Anterior; Inferior;Left;Lower (Removed) Number of days:   
  
 
 
 
 
 
 
 
 
 
Recent vitals (if inpt): 
Patient Vitals for the past 24 hrs: 
 BP Temp Pulse Resp SpO2 Height Weight  
02/07/19 1425 123/77 99 °F (37.2 °C) 73 18 95 % 5' 2\" (1.575 m) 218 lb 9.6 oz (99.2 kg) Labs: 
Recent Labs 02/06/19 02/05/19 
1322 HGB  --  8.2* INR 1.9  --   
 
 
Lab Results Component Value Date/Time WBC 5.5 01/15/2019 10:31 AM  
 HGB 8.2 (L) 02/05/2019 01:22 PM  
 PLATELET 684 02/97/8822 10:31 AM  
 Sodium 139 01/15/2019 10:31 AM  
 Potassium 4.4 01/15/2019 10:31 AM  
 Chloride 97 (L) 01/15/2019 10:31 AM  
 CO2 40 (H) 01/15/2019 10:31 AM  
 BUN 56 (H) 01/15/2019 10:31 AM  
 Creatinine 2.05 (H) 01/15/2019 10:31 AM  
 Glucose 184 (H) 01/15/2019 10:31 AM  
 INR 1.7 01/02/2019 06:23 AM  
 INR, External 1.9 02/06/2019  
 aPTT 60.2 (H) 10/10/2018 04:02 AM  
 Bilirubin, total 0.5 01/15/2019 10:31 AM  
 AST (SGOT) 8 (L) 01/15/2019 10:31 AM  
 ALT (SGPT) 17 01/15/2019 10:31 AM  
 Alk. phosphatase 61 01/15/2019 10:31 AM  
 Amylase 88 01/31/2012 03:12 PM  
 Lipase 157 10/08/2018 04:26 AM  
 Lactic acid 0.7 02/16/2016 06:16 AM  
 Troponin-I <0.05 01/31/2012 03:32 PM  
 Troponin-I, Qt. 0.05 12/31/2018 12:56 PM  
 
 
I reviewed recent labs and recent radiologic studies. I independently reviewed radiology images for studies I described above or studies I have ordered. Admission date (for inpatients): 2/7/2019 * No surgery found *  * No surgery found * ASSESSMENT/PLAN: 
Significant improvement since discharge from the hospital status post debridement on 10/7/2018 and VAC placement. She had PuraplyAM skin substitute graft on 11/1/2018. The VAC was maintained for 1 week without change. The wound is large and full-thickness. She was scheduled for 1 week follow-up after evaluation on 11/8. She became hospitalized and missed her follow-up appointments. She was hospitalized with respiratory issues and is on home oxygen as well as her chronic anticoagulation. She has developed some bloody drainage from the [facility]. The wound has improved dramatically with 100% granulation base. PuraplyAM #2 was placed on 12/6 and well tolerated. We discontinued the VAC since it had filled with blood. However, on 12/20 her wound had a very bad odor. The graft was somewhat soupy. The graft was intact. There appears to be an over abundance of granulation. We did not place another graft, but dressed with Hydrofera Blue ready. The wound did calm down but did not require any silver nitrate. However the dressing has come down from her knee. We will need to immobilize her knee better and wrap at higher to maintain a good dressing. This will be critical to perform any grafting procedure without stabilization with a VAC. She missed 1/3 appointment due to being hospitalized for CHF exacerbation. We obtained a knee immobilizer. We will try for the next week to see stable dressing. I will not doing grafting procedures until we have stabilization of the wound and dressings. Unfortunately she did not tolerate the knee immobilizer. We will not pursue grafting but see if we can get this to heal from the perimeter.   Granulation tissue was recurrent today and I knocked it back with silver nitrate. This will be likely necessary each visit. We did this for the last 2 visits. This was well-tolerated. We will continue with [facility] ready. She will follow-up in 2 weeks and see Dr. Kevin Morgan. I feel confident that we will eventually obtain wound closure by controlling the granulation tissue and standard wound care Problem List  Date Reviewed: 1/15/2019 Codes Class Noted Excessive granulation tissue ICD-10-CM: L92.9 ICD-9-CM: 701.5  1/24/2019 Severe obesity ([address] ICD-10-CM: E66.01 
ICD-9-CM: 278.01  1/15/2019 Skin defect ICD-10-CM: L98.9 ICD-9-CM: 709.8  1/10/2019 Systolic CHF, acute on chronic (HCC) ICD-10-CM: J36.94 ICD-9-CM: 428.23, 428.0  12/31/2018 Acute on chronic combined systolic and diastolic CHF (congestive heart failure) (HCC) ICD-10-CM: I50.43 ICD-9-CM: 428.43, 428.0  12/31/2018 Acute exacerbation of CHF (congestive heart failure) (HCC) ICD-10-CM: I50.9 ICD-9-CM: 428.0  11/14/2018 Debility ICD-10-CM: R53.81 ICD-9-CM: 799.3  9/8/2018 Anemia (Chronic) ICD-10-CM: D64.9 ICD-9-CM: 285.9  9/7/2018 Chronic respiratory failure with hypoxia [facility]) ICD-10-CM: J96.11 
ICD-9-CM: 518.83, 799.02  9/7/2018 Encounter for immunization ICD-10-CM: D61 ICD-9-CM: V03.89  8/21/2018 Physical debility (Chronic) ICD-10-CM: R53.81 ICD-9-CM: 799.3  5/2/2018 Closed nondisplaced fracture of shaft of fifth metacarpal bone of left hand ICD-10-CM: S79.707X ICD-9-CM: 815.03  4/28/2018 Type 2 diabetes mellitus with nephropathy (HCC) (Chronic) ICD-10-CM: E11.21 
ICD-9-CM: 250.40, 583.81  12/18/2017 S/P AVR (aortic valve replacement) (Chronic) ICD-10-CM: Z95.2 ICD-9-CM: V43.3  Unknown Overview Signed 2/23/2016 11:04 AM by Christiano Whitehead Mechanical/Artific Cardiomyopathy [address] [address] (Chronic) ICD-10-CM: I42.9 ICD-9-CM: 425.4  Unknown Osteopenia ICD-10-CM: M85.80 ICD-9-CM: 733.90  Unknown HLD (hyperlipidemia) (Chronic) ICD-10-CM: Z30.9 ICD-9-CM: 272.4  Unknown Osteoarthritis ICD-10-CM: M19.90 ICD-9-CM: 715.90  Unknown  
   
 ICD (implantable cardioverter-defibrillator) in place ICD-10-CM: Z95.810 ICD-9-CM: V45.02  10/2/2014 Overview Signed 10/2/2014  4:58 PM by Nicole Villagomez Biotronik single-chamber ICD implantation 10/20/14 COPD (chronic obstructive pulmonary disease) (HCC) (Chronic) ICD-10-CM: J44.9 ICD-9-CM: 146  4/2/2014 Overview Signed 10/6/2018  8:56 PM by Ene Shaw NP  
  HOME OXYGEN 3 LITERS 
  
  
   
 REJI (obstructive sleep apnea)-cpap (Chronic) ICD-10-CM: G47.33 
ICD-9-CM: 327.23  4/2/2014 CKD (chronic kidney disease) stage 3, GFR 30-59 ml/min (HCC) (Chronic) ICD-10-CM: N18.3 ICD-9-CM: 585.3  7/10/2013 Anticoagulated on Coumadin (Chronic) ICD-10-CM: Z51.81, Z79.01 
ICD-9-CM: V58.83, V58.61  7/9/2013 Overview Signed 7/9/2013  4:16 PM by Brianne Castillo NP  
  S/P AVR 
  
  
   
 CAD (coronary artery disease) (Chronic) ICD-10-CM: I25.10 ICD-9-CM: 414.00  1/20/2013 Overview Signed 5/20/2014 10:05 AM by Conner Acevedo NP  
  5/8/14 PCI LAD with stent placed Chronic combined systolic and diastolic heart failure (HCC) (Chronic) ICD-10-CM: I50.42 
ICD-9-CM: 428.42  1/20/2013 Overview Addendum 4/28/2018  4:53 AM by Ramón Carrizales MD  
  5/8/14 ECHO:  EF 10-15% 12/2017:  EF 25-30% Essential hypertension, benign (Chronic) ICD-10-CM: I10 
ICD-9-CM: 401.1  1/20/2013 MDS (myelodysplastic syndrome) (HCC) (Chronic) ICD-10-CM: D46.9 ICD-9-CM: 238.75  12/17/2011 Overview Addendum 8/29/2013 11:06 AM by Mariana Simmons Procrit started in August, 2011 12/18/11 Procrit weekly and Iron stores. 5-12  
 
 
 
12-13-12 good response to 3 weekly procrit did not need it last time 3-7-13 Pt doing well. Just wanted a \"check-up. \" Responding to Procrit every three weeks. 8-29-13 patient has missed some injections on recently restarted hemoglobin only issue , takes oral iron iron stores each time Iron deficiency anemia due to chronic blood loss (Chronic) ICD-10-CM: D50.0 ICD-9-CM: 280.0  7/29/2009 Active Problems: * No active hospital problems. * Any procedures done today in the wound center are documented in a separate note in connect care made part of this record by reference Wound Care Orders Placed This Encounter  WOUND CARE, DRESSING CHANGE Left anterior knee wound: 
Cleanse wound with normal saline. Apply Hydrofera Ready, ABD, and wrap with unna boot or coban 2. Home health to change dressing 3 times per week. Standing Status:   Standing Number of Occurrences:   1  silver nitrate applicators (ARZOL) 1 Applicator  silver nitrate applicators (ARZOL) 1 Applicator Follow-up Information Follow up With Specialties Details Why Contact Info [facility] WOUND CARE Wound Care In 2 weeks  [facility] [phone] Signed:  El Moran MD,  FACS

## 2024-08-15 NOTE — TELEPHONE ENCOUNTER
Patient called in to make a TCM appointment. Called the office with no answer.    Please call patient back to set up a TCM appointment [phone].    Thank you

## 2024-08-15 NOTE — TELEPHONE ENCOUNTER
TCM call made, pt stated he will be following up with weight management for after care and does not need to see Dr. Hernandez.

## 2024-08-17 ENCOUNTER — HOSPITAL ENCOUNTER (EMERGENCY)
Facility: HOSPITAL | Age: 49
Discharge: HOME | End: 2024-08-18
Attending: EMERGENCY MEDICINE
Payer: COMMERCIAL

## 2024-08-17 DIAGNOSIS — K92.2 GI BLEED: ICD-10-CM

## 2024-08-17 DIAGNOSIS — D62 ACUTE BLOOD LOSS ANEMIA: Primary | ICD-10-CM

## 2024-08-17 PROCEDURE — 99285 EMERGENCY DEPT VISIT HI MDM: CPT | Performed by: EMERGENCY MEDICINE

## 2024-08-17 PROCEDURE — 99284 EMERGENCY DEPT VISIT MOD MDM: CPT

## 2024-08-18 VITALS
OXYGEN SATURATION: 99 % | HEART RATE: 66 BPM | TEMPERATURE: 97.7 F | SYSTOLIC BLOOD PRESSURE: 114 MMHG | DIASTOLIC BLOOD PRESSURE: 76 MMHG | BODY MASS INDEX: 36.27 KG/M2 | RESPIRATION RATE: 16 BRPM | WEIGHT: 245.59 LBS

## 2024-08-18 LAB
ABO GROUP BLD: NORMAL
ALBUMIN SERPL BCG-MCNC: 4.2 G/DL (ref 3.5–5)
ALP SERPL-CCNC: 75 U/L (ref 34–104)
ALT SERPL W P-5'-P-CCNC: 66 U/L (ref 7–52)
ANION GAP SERPL CALCULATED.3IONS-SCNC: 7 MMOL/L (ref 4–13)
APTT PPP: 37 SECONDS (ref 23–34)
AST SERPL W P-5'-P-CCNC: 30 U/L (ref 13–39)
BASOPHILS # BLD AUTO: 0.04 THOUSANDS/ÂΜL (ref 0–0.1)
BASOPHILS NFR BLD AUTO: 1 % (ref 0–1)
BILIRUB SERPL-MCNC: 0.37 MG/DL (ref 0.2–1)
BLD GP AB SCN SERPL QL: NEGATIVE
BUN SERPL-MCNC: 24 MG/DL (ref 5–25)
CALCIUM SERPL-MCNC: 9.4 MG/DL (ref 8.4–10.2)
CHLORIDE SERPL-SCNC: 103 MMOL/L (ref 96–108)
CO2 SERPL-SCNC: 27 MMOL/L (ref 21–32)
CREAT SERPL-MCNC: 0.86 MG/DL (ref 0.6–1.3)
EOSINOPHIL # BLD AUTO: 0.27 THOUSAND/ÂΜL (ref 0–0.61)
EOSINOPHIL NFR BLD AUTO: 4 % (ref 0–6)
ERYTHROCYTE [DISTWIDTH] IN BLOOD BY AUTOMATED COUNT: 12.6 % (ref 11.6–15.1)
GFR SERPL CREATININE-BSD FRML MDRD: 103 ML/MIN/1.73SQ M
GLUCOSE SERPL-MCNC: 83 MG/DL (ref 65–140)
HCT VFR BLD AUTO: 29.9 % (ref 36.5–49.3)
HCT VFR BLD AUTO: 32.1 % (ref 36.5–49.3)
HGB BLD-MCNC: 10.3 G/DL (ref 12–17)
HGB BLD-MCNC: 11.1 G/DL (ref 12–17)
IMM GRANULOCYTES # BLD AUTO: 0.02 THOUSAND/UL (ref 0–0.2)
IMM GRANULOCYTES NFR BLD AUTO: 0 % (ref 0–2)
INR PPP: 1.09 (ref 0.85–1.19)
LACTATE SERPL-SCNC: 0.6 MMOL/L (ref 0.5–2)
LYMPHOCYTES # BLD AUTO: 2.39 THOUSANDS/ÂΜL (ref 0.6–4.47)
LYMPHOCYTES NFR BLD AUTO: 34 % (ref 14–44)
MCH RBC QN AUTO: 29.4 PG (ref 26.8–34.3)
MCHC RBC AUTO-ENTMCNC: 34.6 G/DL (ref 31.4–37.4)
MCV RBC AUTO: 85 FL (ref 82–98)
MONOCYTES # BLD AUTO: 0.51 THOUSAND/ÂΜL (ref 0.17–1.22)
MONOCYTES NFR BLD AUTO: 7 % (ref 4–12)
NEUTROPHILS # BLD AUTO: 3.88 THOUSANDS/ÂΜL (ref 1.85–7.62)
NEUTS SEG NFR BLD AUTO: 54 % (ref 43–75)
NRBC BLD AUTO-RTO: 0 /100 WBCS
PLATELET # BLD AUTO: 348 THOUSANDS/UL (ref 149–390)
PMV BLD AUTO: 10.2 FL (ref 8.9–12.7)
POTASSIUM SERPL-SCNC: 4.1 MMOL/L (ref 3.5–5.3)
PROT SERPL-MCNC: 7.6 G/DL (ref 6.4–8.4)
PROTHROMBIN TIME: 14.3 SECONDS (ref 12.3–15)
RBC # BLD AUTO: 3.77 MILLION/UL (ref 3.88–5.62)
RH BLD: POSITIVE
SODIUM SERPL-SCNC: 137 MMOL/L (ref 135–147)
SPECIMEN EXPIRATION DATE: NORMAL
WBC # BLD AUTO: 7.11 THOUSAND/UL (ref 4.31–10.16)

## 2024-08-18 PROCEDURE — 86900 BLOOD TYPING SEROLOGIC ABO: CPT | Performed by: EMERGENCY MEDICINE

## 2024-08-18 PROCEDURE — 86901 BLOOD TYPING SEROLOGIC RH(D): CPT | Performed by: EMERGENCY MEDICINE

## 2024-08-18 PROCEDURE — 85610 PROTHROMBIN TIME: CPT | Performed by: EMERGENCY MEDICINE

## 2024-08-18 PROCEDURE — 85730 THROMBOPLASTIN TIME PARTIAL: CPT | Performed by: EMERGENCY MEDICINE

## 2024-08-18 PROCEDURE — 85025 COMPLETE CBC W/AUTO DIFF WBC: CPT | Performed by: EMERGENCY MEDICINE

## 2024-08-18 PROCEDURE — NC001 PR NO CHARGE: Performed by: SURGERY

## 2024-08-18 PROCEDURE — 36415 COLL VENOUS BLD VENIPUNCTURE: CPT | Performed by: EMERGENCY MEDICINE

## 2024-08-18 PROCEDURE — 80053 COMPREHEN METABOLIC PANEL: CPT | Performed by: EMERGENCY MEDICINE

## 2024-08-18 PROCEDURE — 85018 HEMOGLOBIN: CPT | Performed by: EMERGENCY MEDICINE

## 2024-08-18 PROCEDURE — 86850 RBC ANTIBODY SCREEN: CPT | Performed by: EMERGENCY MEDICINE

## 2024-08-18 PROCEDURE — 83605 ASSAY OF LACTIC ACID: CPT | Performed by: EMERGENCY MEDICINE

## 2024-08-18 PROCEDURE — 85014 HEMATOCRIT: CPT | Performed by: EMERGENCY MEDICINE

## 2024-08-18 NOTE — CONSULTS
Consultation - Bariatric Surgery   Oswald Marvin 49 y.o. male MRN: [id number]  Unit/Bed#: ED-24 Encounter: [id number]    Assessment & Plan     Assessment:  Oswald Leon is a 49-year-old male with history of Sybil-en-Y gastric bypass + hiatal hernia repair on August 12.  Patient presents to the emergency department with bloody bowel movements x 3 and hemoglobin drift of 11.1-10.3 without fluids.  Hemodynamically stable and otherwise asymptomatic.    After discussion with the on-call attending, common bleeding postop is from the JJ staple line.  Patient also not meeting transfusion requirements.  Patient has a good support system at home.    Plan:  Will plan for patient discharged home with strict return criteria. Patient has a good support system at home.    History of Present Illness     HPI:  Oswald Marvin is a 49 y.o. male Body mass index is 36.27 kg/m². with history of Sybil-en-Y gastric bypass + hiatal hernia repair with Dr. Eligio Gilbert in August 12 who presents to the ED with complaints of bloody bowel movements x 3.    Patient normally has a bowel movement about once a week.  Since his surgery this is his first bowel movement.  He says he went to the bathroom 3 times with small clots and bloody liquid.  He is otherwise tolerating his diet, denies feeling nauseated, not having any abdominal pain.    Patient last ate at 6:00 last night.  He has since been sipping on nonsweetened peach tea.    Patient is also taking his PPI, and is not on any anticoagulants.    Consults    Review of Systems   Constitutional:  Negative for chills and fever.   HENT:  Negative for ear pain and sore throat.    Eyes:  Negative for pain and visual disturbance.   Respiratory:  Negative for cough and shortness of breath.    Cardiovascular:  Negative for chest pain and palpitations.   Gastrointestinal:  Negative for abdominal pain and vomiting.   Genitourinary:  Negative for dysuria and hematuria.   Musculoskeletal:  Negative for  arthralgias and back pain.   Skin:  Negative for color change and rash.   Neurological:  Negative for seizures and syncope.   All other systems reviewed and are negative.      Historical Information   Past Medical History:   Diagnosis Date    Colon polyp     Diabetes (HCC)     Diabetes mellitus (HCC)     GERD (gastroesophageal reflux disease)     Hyperlipidemia     YURIY on CPAP      Past Surgical History:   Procedure Laterality Date    COLONOSCOPY      CO LAPS ABD PRTM&OMENTUM DX W/WO SPEC BR/WA SPX N/A 2022    Procedure: LAPAROSCOPY DIAGNOSTIC, LYSIS OF ADHESIONS, REDUCTION OF INTERNAL HERNIA;  Surgeon: Raymond Núñez MD;  [facility];  Service: General    CO LAPS GSTR RSTCV PX W/BYP HEDY-EN-Y LIMB <150 CM N/A 2024    Procedure: BYPASS GASTRIC R-N-Y  W ROBOTICS & INTRAOP EGD,PARAESOPHAGEAL HERNIA REPAIR;  Surgeon: Heydi Gilbert MD;  Location[facility];  Service: Bariatrics    VASECTOMY       Social History   Social History     Substance and Sexual Activity   Alcohol Use Never     Social History     Substance and Sexual Activity   Drug Use Never     Social History     Tobacco Use   Smoking Status Former    Current packs/day: 0.00    Average packs/day: 3.0 packs/day for 15.0 years (45.0 ttl pk-yrs)    Types: Cigarettes    Quit date: 3/16/2012    Years since quittin.4    Passive exposure: Never   Smokeless Tobacco Never   Tobacco Comments    8 YRS AGO     Family History: non-contributory    Meds/Allergies   all current active meds have been reviewed  No Known Allergies    Objective   First Vitals:   Blood Pressure: 129/63 (248)  Pulse: 76 (24)  Temperature: 97.7 °F (36.5 °C) (24)  Temp Source: Oral (24)  Respirations: 18 (24)  Weight - Scale: 111 kg (245 lb 9.5 oz) (24)  SpO2: 99 % (24)    Current Vitals:   Blood Pressure: 109/72 (24 0546)  Pulse: 66 (24 0546)  Temperature: 97.7 °F (36.5 °C) (24  2348)  Temp Source: Oral (08/17/24 2348)  Respirations: 18 (08/18/24 0546)  Weight - Scale: 111 kg (245 lb 9.5 oz) (08/17/24 2348)  SpO2: 99 % (08/18/24 0546)    No intake or output data in the 24 hours ending 08/18/24 0740    Invasive Devices       Peripheral Intravenous Line  Duration             Peripheral IV 08/18/24 Distal;Left;Upper;Ventral (anterior) Arm <1 day                    Physical Exam  Constitutional:       Appearance: Normal appearance.   HENT:      Head: Normocephalic.      Nose: Nose normal.   Eyes:      Extraocular Movements: Extraocular movements intact.   Cardiovascular:      Rate and Rhythm: Normal rate.   Pulmonary:      Effort: Pulmonary effort is normal.   Abdominal:      General: Abdomen is flat.      Palpations: Abdomen is soft.   Genitourinary:     Rectum: Normal.      Comments: Digital rectal exam without any obvious protruding hemorrhoids.  Minor dark blood on finger.  Musculoskeletal:         General: Normal range of motion.   Skin:     General: Skin is warm.   Psychiatric:         Mood and Affect: Mood normal.         Behavior: Behavior normal.         Lab Results: I have personally reviewed pertinent lab results.    Imaging: I have personally reviewed pertinent reports.    EKG, Pathology, and Other Studies: I have personally reviewed pertinent reports.      Counseling / Coordination of Care  Total floor / unit time spent today 45 minutes.  Greater than 50% of total time was spent with the patient and / or family counseling and / or coordination of care.

## 2024-08-18 NOTE — ED PROVIDER NOTES
History  Chief Complaint   Patient presents with    Black or Bloody Stool     Patient states he has gastric bypass on Monday. Today he had his first 3 stools and all of them had dark blood in them.     Patient presents for 3 bloody bowel movements today.  Had a history of a Sybil-en-Y gastric bypass 6 days ago.  Had an uncomplicated surgery and was discharged the next day.  Patient has had some expected constipation and started taking some laxatives and stool softeners to help encourage bowel movement.  Patient had 3 bowel movements today which were all bloody.  He showed me a picture which shows bright red blood, dark blood and blood clots.  He has no pain, fevers, chills, nausea or vomiting.  Did reach out to bariatrics but has not heard back yet.      History provided by:  Patient   used: No        Prior to Admission Medications   Prescriptions Last Dose Informant Patient Reported? Taking?   Blood Glucose Monitoring Suppl (OneTouch Verio Reflect) w/Device KIT  Self No No   Sig: Check blood sugars once daily. Please substitute with appropriate alternative as covered by patient's insurance. Dx: E11.65   OneTouch Delica Lancets 33G MISC  Self No No   Sig: Check blood sugars once daily. Please substitute with appropriate alternative as covered by patient's insurance. Dx: E11.65   acetaminophen (TYLENOL) 160 mg/5 mL liquid   No No   Sig: Take 30.5 mL (975 mg total) by mouth every 8 (eight) hours for 7 days   atorvastatin (LIPITOR) 20 mg tablet  Self No No   Sig: Take 1 tablet (20 mg total) by mouth daily   omeprazole (PriLOSEC) 20 mg delayed release capsule   No No   Sig: Take 1 capsule (20 mg total) by mouth daily   oxyCODONE (Roxicodone) 5 immediate release tablet   No No   Sig: Take 1 tablet (5 mg total) by mouth every 4 (four) hours as needed for moderate pain Max Daily Amount: 30 mg Do not start before August 13, 2024.   venlafaxine (EFFEXOR) 37.5 mg tablet   No No   Sig: Take 1 tablet (37.5 mg  total) by mouth 2 (two) times a day for 7 days      Facility-Administered Medications: None       Past Medical History:   Diagnosis Date    Colon polyp     Diabetes (HCC)     Diabetes mellitus (HCC)     GERD (gastroesophageal reflux disease)     Hyperlipidemia     YURIY on CPAP        Past Surgical History:   Procedure Laterality Date    COLONOSCOPY      [place] LAPS ABD PRTM&OMENTUM DX W/WO SPEC BR/WA SPX N/A 2022    Procedure: LAPAROSCOPY DIAGNOSTIC, LYSIS OF ADHESIONS, REDUCTION OF INTERNAL HERNIA;  Surgeon: Raymond Núñez MD;  Location[facility];  Service: General    VA LAPS GSTR RSTCV PX W/BYP HEDY-EN-Y LIMB <150 CM N/A 2024    Procedure: BYPASS GASTRIC R-N-Y  W ROBOTICS & INTRAOP EGD,PARAESOPHAGEAL HERNIA REPAIR;  Surgeon: Heydi Gilbert MD;  [facility];  Service: Bariatrics    VASECTOMY         Family History   Problem Relation Age of Onset    Liver disease Mother     Emphysema Father      I have reviewed and agree with the history as documented.    E-Cigarette/Vaping    E-Cigarette Use Never User      E-Cigarette/Vaping Substances    Nicotine No     THC No     CBD No     Flavoring No     Other No     Unknown No      Social History     Tobacco Use    Smoking status: Former     Current packs/day: 0.00     Average packs/day: 3.0 packs/day for 15.0 years (45.0 ttl pk-yrs)     Types: Cigarettes     Quit date: 3/16/2012     Years since quittin.4     Passive exposure: Never    Smokeless tobacco: Never    Tobacco comments:     8 YRS AGO   Vaping Use    Vaping status: Never Used   Substance Use Topics    Alcohol use: Never    Drug use: Never       Review of Systems   Constitutional:  Negative for appetite change, chills and fever.   Eyes:  Negative for visual disturbance.   Respiratory:  Negative for cough, chest tightness and shortness of breath.    Cardiovascular:  Negative for chest pain and leg swelling.   Gastrointestinal:  Positive for constipation. Negative for abdominal distention,  abdominal pain, nausea, rectal pain and vomiting.   Musculoskeletal:  Negative for back pain and neck pain.   Skin:  Negative for color change, pallor, rash and wound.   Allergic/Immunologic: Negative for immunocompromised state.   Neurological:  Negative for dizziness, syncope and light-headedness.   All other systems reviewed and are negative.      Physical Exam  Physical Exam  Vitals and nursing note reviewed.   Constitutional:       General: He is not in acute distress.     Appearance: He is well-developed. He is not ill-appearing, toxic-appearing or diaphoretic.   HENT:      Head: Normocephalic and atraumatic.      Right Ear: External ear normal.      Left Ear: External ear normal.      Nose: No congestion.   Eyes:      General: No scleral icterus.     Conjunctiva/sclera: Conjunctivae normal.      Right eye: Right conjunctiva is not injected.      Left eye: Left conjunctiva is not injected.   Neck:      Trachea: No tracheal deviation.   Cardiovascular:      Rate and Rhythm: Normal rate.      Pulses: Normal pulses.   Pulmonary:      Effort: Pulmonary effort is normal. No respiratory distress.      Breath sounds: No stridor.   Abdominal:      General: A surgical scar is present.      Palpations: Abdomen is soft.      Tenderness: There is no abdominal tenderness.      Comments: Surgical scars are clean, dry and intact   Musculoskeletal:      Cervical back: Normal range of motion.   Skin:     General: Skin is warm and dry.      Capillary Refill: Capillary refill takes less than 2 seconds.      Coloration: Skin is not pale.      Findings: No erythema or rash.   Neurological:      Mental Status: He is alert and oriented to person, place, and time.      Motor: No abnormal muscle tone.   Psychiatric:         Mood and Affect: Mood and affect and mood normal.         Behavior: Behavior normal.         Vital Signs  ED Triage Vitals [08/17/24 2348]   Temperature Pulse Respirations Blood Pressure SpO2   97.7 °F (36.5 °C) 76  18 129/63 99 %      Temp Source Heart Rate Source Patient Position - Orthostatic VS BP Location FiO2 (%)   Oral Monitor Sitting Right arm --      Pain Score       --           Vitals:    08/17/24 2348 08/18/24 0219 08/18/24 0546   BP: 129/63 100/57 109/72   Pulse: 76 67 66   Patient Position - Orthostatic VS: Sitting           Visual Acuity      ED Medications  Medications - No data to display    Diagnostic Studies  Results Reviewed       Procedure Component Value Units Date/Time    Hemoglobin and hematocrit, blood [[id number]]  (Abnormal) Collected: 08/18/24 0400    Lab Status: Final result Specimen: Blood from Arm, Left Updated: 08/18/24 0404     Hemoglobin 10.3 g/dL      Hematocrit 29.9 %     Lactic acid, plasma (w/reflex if result > 2.0) [[id number]]  (Normal) Collected: 08/18/24 0038    Lab Status: Final result Specimen: Blood from Arm, Left Updated: 08/18/24 0107     LACTIC ACID 0.6 mmol/L     Narrative:      Result may be elevated if tourniquet was used during collection.    Comprehensive metabolic panel [[id number]]  (Abnormal) Collected: 08/18/24 0038    Lab Status: Final result Specimen: Blood from Arm, Left Updated: 08/18/24 0107     Sodium 137 mmol/L      Potassium 4.1 mmol/L      Chloride 103 mmol/L      CO2 27 mmol/L      ANION GAP 7 mmol/L      BUN 24 mg/dL      Creatinine 0.86 mg/dL      Glucose 83 mg/dL      Calcium 9.4 mg/dL      AST 30 U/L      ALT 66 U/L      Alkaline Phosphatase 75 U/L      Total Protein 7.6 g/dL      Albumin 4.2 g/dL      Total Bilirubin 0.37 mg/dL      eGFR 103 ml/min/1.73sq m     Narrative:      National Kid[employer] Disease Foundation guidelines for Chronic Kidney Disease (CKD):     Stage 1 with normal or high GFR (GFR > 90 mL/min/1.73 square meters)    Stage 2 Mild CKD (GFR = 60-89 mL/min/1.73 square meters)    Stage 3A Moderate CKD (GFR = 45-59 mL/min/1.73 square meters)    Stage 3B Moderate CKD (GFR = 30-44 mL/min/1.73 square meters)    Stage 4 Severe CKD (GFR = 15-29 mL/min/1.73  square meters)    Stage 5 End Stage CKD (GFR <15 mL/min/1.73 square meters)  Note: GFR calculation is accurate only with a steady state creatinine    Protime-INR [[id number]]  (Normal) Collected: 08/18/24 0038    Lab Status: Final result Specimen: Blood from Arm, Left Updated: 08/18/24 0102     Protime 14.3 seconds      INR 1.09    Narrative:      INR Therapeutic Range    Indication                                             INR Range      Atrial Fibrillation                                               2.0-3.0  Hypercoagulable State                                    2.0.2.3  Left Ventricular Asist Device                            2.0-3.0  Mechanical Heart Valve                                  -    Aortic(with afib, MI, embolism, HF, LA enlargement,    and/or coagulopathy)                                     2.0-3.0 (2.5-3.5)     Mitral                                                             2.5-3.5  Prosthetic/Bioprosthetic Heart Valve               2.0-3.0  Venous thromboembolism (VTE: VT, PE        2.0-3.0    APTT [[id number]]  (Abnormal) Collected: 08/18/24 0038    Lab Status: Final result Specimen: Blood from Arm, Left Updated: 08/18/24 0102     PTT 37 seconds     CBC and differential [[id number]]  (Abnormal) Collected: 08/18/24 0038    Lab Status: Final result Specimen: Blood from Arm, Left Updated: 08/18/24 0046     WBC 7.11 Thousand/uL      RBC 3.77 Million/uL      Hemoglobin 11.1 g/dL      Hematocrit 32.1 %      MCV 85 fL      MCH 29.4 pg      MCHC 34.6 g/dL      RDW 12.6 %      MPV 10.2 fL      Platelets 348 Thousands/uL      nRBC 0 /100 WBCs      Segmented % 54 %      Immature Grans % 0 %      Lymphocytes % 34 %      Monocytes % 7 %      Eosinophils Relative 4 %      Basophils Relative 1 %      Absolute Neutrophils 3.88 Thousands/µL      Absolute Immature Grans 0.02 Thousand/uL      Absolute Lymphocytes 2.39 Thousands/µL      Absolute Monocytes 0.51 Thousand/µL      Eosinophils Absolute 0.27  Thousand/µL      Basophils Absolute 0.04 Thousands/µL                    No orders to display              Procedures  Procedures         ED Course  ED Course as of 08/18/24 0622   Sun Aug 18, 2024   0053 CBC and differential(!)  Mild drop in hemoglobin    0126 Lactic acid, plasma (w/reflex if result > 2.0)  Normal    0126 Comprehensive metabolic panel(!)  Normal    0126 Protime-INR  Normal    0609 Hemoglobin and hematocrit, blood(!)  1g drop in hemoglobin from 1st draw                                               Medical Decision Making  12:02 AM  Patient appears well on exam.  He has normal vital signs, no abdominal tenderness and did show me a picture of the bloody bowel movement.  There is bright and dark red blood with clots.  Possible normal side effect from surgery, does have a history of hemorrhoids which could be that as well, possible brisk upper GI bleed or lower GI bleed.  Will reach out to bariatrics to discuss further evaluation and possible imaging.    1:14 AM  Case being discussed with bariatric fellow.  Hemoglobin came back and is slightly lower but to be expected at this point.  It is currently 11.1 whereas preop it was 13.4.  Plan moving forward is to repeat the hemoglobin in 4 hours from the first blood draw and observe here in the emergency department.  If stable then can be safely discharged for outpatient follow-up as scheduled.  Patient did just informed that he has to have another bowel movement so we will evaluate this for further blood.    4:49 AM  Patient dropped 1 g of hemoglobin.  Message sent back to bariatrics to discuss further intervention/evaluation.     5:18 AM  Message sent again to bariatrics.    5:54 AM  Patient stable.  Bariatrics on their way for evaluation.    6:22 AM  Patient signed out to the oncoming physician to follow-up on the bariatrics evaluation and recommendations for disposition.    Amount and/or Complexity of Data Reviewed  Labs: ordered. Decision-making details  documented in ED Course.                 Disposition  Final diagnoses:   Acute blood loss anemia   GI bleed     Time reflects when diagnosis was documented in both MDM as applicable and the Disposition within this note       Time User Action Codes Description Comment    8/18/2024  5:54 AM Naren Madrigal [D62] Acute blood loss anemia     8/18/2024  5:54 AM Naren Madrigal [K92.2] GI bleed           ED Disposition       None          Follow-up Information    None         Patient's Medications   Discharge Prescriptions    No medications on file       No discharge procedures on file.    PDMP Review         Value Time User    PDMP Reviewed  Yes 8/2/2024 12:26 PM Joana Pham PA-C            ED Provider  Electronically Signed by             Naren Madrigal Jr.,   08/18/24 0622

## 2024-08-18 NOTE — ED CARE HANDOFF
Emergency Department Sign Out Note        Sign out and transfer of care from Dr. Madrigal. See Separate Emergency Department note.     The patient, Oswald Marvin, was evaluated by the previous provider for blood in stool.    Workup Completed:  Labs/    ED Course / Workup Pending (followup):                                  ED Course as of 08/18/24 0823   Sun Aug 18, 2024   0749 Patient received in signout pending bariatrics evaluation.  Patient evaluated by Dr. Young Obrien, Bariatrics Fellow, advised patient is stable for discharge home, outpatient follow up. RTED for worsening symptoms, dizziness or any other concerns.   0818 Blood Pressure: 114/76   0819 Pulse: 66   0819 Respirations: 16   0819 SpO2: 99 %  Repeat vitals reviewed, remains stable.   0820 Patient evaluated at bedside, conscious, alert, stable vital signs, no acute distress, informed about ER evaluation, bariatrics input, patient states that he feels all right, okay going home, advised strict return precautions for increased or worsening bleeding, dizziness, if feels like passing out or any other concerns.  Patient showed understanding of instructions.     Procedures  Medical Decision Making  Amount and/or Complexity of Data Reviewed  Labs: ordered.            Disposition  Final diagnoses:   Acute blood loss anemia   GI bleed     Time reflects when diagnosis was documented in both MDM as applicable and the Disposition within this note       Time User Action Codes Description Comment    8/18/2024  5:54 AM Naren Madrigal [D62] Acute blood loss anemia     8/18/2024  5:54 AM Naren Madrigal [K92.2] GI bleed           ED Disposition       ED Disposition   Discharge    Condition   Stable    Date/Time   Sun Aug 18, 2024  8:23 AM    Comment   Oswald Marvin discharge to home/self care.                   Follow-up Information       Follow up With Specialties Details Why Contact Info Additional Information    Anastacia Hernandez,  Family  Medicine Schedule an appointment as soon as possible for a visit   [address]  [address]  [facility] [address]  [phone]       Heydi Gilbert MD Bariatrics, General Surgery Schedule an appointment as soon as possible for a visit   [address]  [address] [facility] [address]  [phone]       [facility] [place] [facility] Schedule an appointment as soon as possible for a visit   [address]  [address]  [facility] [address] [phone] [facility] [place], [address] [place] [address], [address], [place], [address] [phone]    [facility] Emergency [facility] Emergency Medicine  If symptoms worsen [address] [facility] [id number] [phone] [facility] Emergency Department, [address] [place], [place], [address]          Patient's Medications   Discharge Prescriptions    No medications on file     No discharge procedures on file.       ED Provider  Electronically Signed by     Giorgi Dickerson MD  08/18/24 1961

## 2024-08-19 DIAGNOSIS — Z00.6 ENCOUNTER FOR EXAMINATION FOR NORMAL COMPARISON OR CONTROL IN CLINICAL RESEARCH PROGRAM: ICD-10-CM

## 2024-08-23 ENCOUNTER — OFFICE VISIT (OUTPATIENT)
Dept: BARIATRICS | Facility: CLINIC | Age: 49
End: 2024-08-23

## 2024-08-23 ENCOUNTER — PATIENT MESSAGE (OUTPATIENT)
Dept: FAMILY MEDICINE CLINIC | Facility: CLINIC | Age: 49
End: 2024-08-23

## 2024-08-23 ENCOUNTER — CLINICAL SUPPORT (OUTPATIENT)
Dept: BARIATRICS | Facility: CLINIC | Age: 49
End: 2024-08-23

## 2024-08-23 VITALS
BODY MASS INDEX: 35.32 KG/M2 | OXYGEN SATURATION: 97 % | HEIGHT: 69 IN | DIASTOLIC BLOOD PRESSURE: 68 MMHG | SYSTOLIC BLOOD PRESSURE: 132 MMHG | WEIGHT: 238.5 LBS | HEART RATE: 88 BPM | TEMPERATURE: 97.5 F

## 2024-08-23 DIAGNOSIS — E66.9 OBESITY, CLASS II, BMI 35-39.9: Primary | ICD-10-CM

## 2024-08-23 DIAGNOSIS — Z98.84 BARIATRIC SURGERY STATUS: ICD-10-CM

## 2024-08-23 PROCEDURE — 99024 POSTOP FOLLOW-UP VISIT: CPT | Performed by: SURGERY

## 2024-08-23 PROCEDURE — RECHECK: Performed by: DIETITIAN, REGISTERED

## 2024-08-23 RX ORDER — DIPHENOXYLATE HYDROCHLORIDE AND ATROPINE SULFATE 2.5; .025 MG/1; MG/1
1 TABLET ORAL DAILY
COMMUNITY

## 2024-08-23 NOTE — PROGRESS NOTES
Date of surgery: 8/12/2024  Procedure: Sleeve   Performing surgeon: Dr. Del Valle     Initial Weight - 256.7 lb   Current Weight -238.5 lb   Total Body Weight Loss (EWL)- 18.3  EWL% - 21%  TWB % - 7%

## 2024-08-23 NOTE — PROGRESS NOTES
POST OP UP VISIT - BARIATRIC SURGERY  Oswald Marvin 49 y.o. male MRN: [id number]  Unit/Bed#:  Encounter: [id number]      HPI:  Oswald Marvin is a 49 y.o. male status post Robotic RNYGB performed by Dr. JAS Gilbert on 8/12/24 returning to office for first post op visit since surgery.    Tolerating diet.  Denies nausea and vomiting.  Taking multivitamins and PPI daily.  Experienced bright red blood per rectum immediately postup but denies further blood loss. Hasn't had a bowel movement since surgery. He has been drinking prune juice but not taking miralax or other OTC meds.    Review of Systems   Constitutional:  Negative for chills and fever.   HENT:  Negative for ear pain and sore throat.    Eyes:  Negative for pain and visual disturbance.   Respiratory:  Negative for cough and shortness of breath.    Cardiovascular:  Negative for chest pain and palpitations.   Gastrointestinal:  Negative for abdominal pain and vomiting.   Genitourinary:  Negative for dysuria and hematuria.   Musculoskeletal:  Negative for arthralgias and back pain.   Skin:  Negative for color change and rash.   Neurological:  Negative for seizures and syncope.   All other systems reviewed and are negative.      Historical Information   Past Medical History:   Diagnosis Date    Colon polyp     Diabetes (HCC)     Diabetes mellitus (HCC)     GERD (gastroesophageal reflux disease)     Hyperlipidemia     YURIY on CPAP      Past Surgical History:   Procedure Laterality Date    COLONOSCOPY      [place] LAPS ABD PRTM&OMENTUM DX W/WO SPEC BR/WA SPX N/A 08/16/2022    Procedure: LAPAROSCOPY DIAGNOSTIC, LYSIS OF ADHESIONS, REDUCTION OF INTERNAL HERNIA;  Surgeon: Raymond Núñez MD;  [facility];  Service: General    [place] LAPS GSTR RSTCV PX W/BYP HEDY-EN-Y LIMB <150 CM N/A 8/12/2024    Procedure: BYPASS GASTRIC R-N-Y  W ROBOTICS & INTRAOP EGD,PARAESOPHAGEAL HERNIA REPAIR;  Surgeon: Heydi Gilbert MD;  Location[facility];  Service: Bariatrics    VASECTOMY  "      Social History   Social History     Substance and Sexual Activity   Alcohol Use Never     Social History     Substance and Sexual Activity   Drug Use Never     Social History     Tobacco Use   Smoking Status Former    Current packs/day: 0.00    Average packs/day: 3.0 packs/day for 15.0 years (45.0 ttl pk-yrs)    Types: Cigarettes    Quit date: 3/16/2012    Years since quittin.4    Passive exposure: Never   Smokeless Tobacco Never   Tobacco Comments    8 YRS AGO     Family History: non-contributory    Meds/Allergies   all medications and allergies reviewed  No Known Allergies    Objective       Current Vitals:   Blood Pressure: 132/68 (24 1028)  Pulse: 88 (24 1028)  Temperature: 97.5 °F (36.4 °C) (24 1028)  Temp Source: Tympanic (24 1028)  Height: 5' 9.2\" (175.8 cm) (24 1028)  Weight - Scale: 108 kg (238 lb 8 oz) (24 1028)  SpO2: 97 % (24 1028)      Invasive Devices       None                   Physical Exam  Constitutional:       Appearance: Normal appearance. He is obese.   HENT:      Head: Normocephalic and atraumatic.      Nose: Nose normal.      Mouth/Throat:      Mouth: Mucous membranes are moist.   Eyes:      Extraocular Movements: Extraocular movements intact.      Pupils: Pupils are equal, round, and reactive to light.   Cardiovascular:      Rate and Rhythm: Normal rate and regular rhythm.      Pulses: Normal pulses.      Heart sounds: Normal heart sounds.   Pulmonary:      Effort: Pulmonary effort is normal.      Breath sounds: Normal breath sounds.   Abdominal:      Palpations: Abdomen is soft.      Comments: Incisions healing well with no signs of infection or drainage   Musculoskeletal:      Cervical back: Normal range of motion.   Skin:     General: Skin is warm.   Neurological:      General: No focal deficit present.      Mental Status: He is alert and oriented to person, place, and time.   Psychiatric:         Mood and Affect: Mood normal.         " Behavior: Behavior normal.             Assessment/PLAN:    49 y.o. male status post Robotic RNYGB done on 8/12/24 by Dr. Eligio Gilbert, doing well post op. No major issues and healing well.       Increase physical activity slowly as tolerated and instructed.  Advance diet as instructed by our dietitians today and as indicated in the binder.  Continue PPI  Recommended miralax, fiber, suppositories until BM  Call if no BM in the next week    Follow up in one month as scheduled.          Joana Pham PA-C  Bariatric Surgery   8/23/2024  11:01 AM      .

## 2024-08-23 NOTE — PROGRESS NOTES
Weight Management Nutrition Note      Bariatric Surgeon: Dr. Heydi Gilbert    Surgery: Gastric Bypass Laparoscopic    Class: first post op note    Topics discussed today include:     fluid goals post op, protein goals post op, constipation, chew food well, exercise, PPI use, diet progression, dumping syndrome, protein supplems, vitamin/mineral supplements, and calcium supplements    Patient was able to verbalize basic diet (protein, fluid, vitamin and mineral) recommendations and possible nutrition-related complications. Yes

## 2024-08-29 ENCOUNTER — APPOINTMENT (OUTPATIENT)
Dept: LAB | Facility: CLINIC | Age: 49
End: 2024-08-29

## 2024-08-29 DIAGNOSIS — Z00.6 ENCOUNTER FOR EXAMINATION FOR NORMAL COMPARISON OR CONTROL IN CLINICAL RESEARCH PROGRAM: ICD-10-CM

## 2024-08-29 PROCEDURE — 36415 COLL VENOUS BLD VENIPUNCTURE: CPT

## 2024-09-10 LAB
APOB+LDLR+PCSK9 GENE MUT ANL BLD/T: NOT DETECTED
BRCA1+BRCA2 DEL+DUP + FULL MUT ANL BLD/T: NOT DETECTED
MLH1+MSH2+MSH6+PMS2 GN DEL+DUP+FUL M: NOT DETECTED

## 2024-09-23 NOTE — PROGRESS NOTES
Weight Management Nutrition Class     Diagnosis: Morbid Obesity    Bariatric Surgeon: Dr. Heydi Gilbert    Surgery: Gastric Bypass Laparoscopic    Class: 5 week post op     Topics discussed today include:     fluid goals post op, protein goals post op, constipation, chew food well, exercise, avoidance of alcohol, PPI use, diet progression, hypoglycemia, dumping syndrome, protein supplems, vitamin/mineral supplements, calcium supplements, and iron supplements    Patient was able to verbalize basic diet (protein, fluid, vitamin and mineral) recommendations and possible nutrition-related complications. Yes

## 2024-09-24 ENCOUNTER — CLINICAL SUPPORT (OUTPATIENT)
Dept: BARIATRICS | Facility: CLINIC | Age: 49
End: 2024-09-24

## 2024-09-24 DIAGNOSIS — Z98.84 BARIATRIC SURGERY STATUS: ICD-10-CM

## 2024-09-24 DIAGNOSIS — E66.9 OBESITY, CLASS II, BMI 35-39.9: Primary | ICD-10-CM

## 2024-09-24 DIAGNOSIS — E66.812 OBESITY, CLASS II, BMI 35-39.9: Primary | ICD-10-CM

## 2024-09-24 PROCEDURE — RECHECK: Performed by: DIETITIAN, REGISTERED

## 2024-10-14 ENCOUNTER — TELEPHONE (OUTPATIENT)
Dept: BARIATRICS | Facility: CLINIC | Age: 49
End: 2024-10-14

## 2024-10-14 NOTE — TELEPHONE ENCOUNTER
Patient currently scheduled with Dr Eligio Gilbert for a follow up on 10/24. Patient has been having trouble keeping food down and has a lump by incision he is concerned about. Patient is hoping to be seen before 10/24 appointment. Patient can be reached at [phone]

## 2024-10-15 ENCOUNTER — TELEPHONE (OUTPATIENT)
Dept: BARIATRICS | Facility: CLINIC | Age: 49
End: 2024-10-15

## 2024-10-15 NOTE — TELEPHONE ENCOUNTER
LVM - called pt to schedule an appt with dr. Faust so they can discuss the issues he is having gave the call back number so we can schedule it.

## 2024-10-15 NOTE — TELEPHONE ENCOUNTER
Pt called back to speak about making his appt made a follow up appt at 3:30 on Friday the 18th pt states is he should cancel the other one I informed him that the provider will make that decision.

## 2024-10-18 ENCOUNTER — OFFICE VISIT (OUTPATIENT)
Dept: BARIATRICS | Facility: CLINIC | Age: 49
End: 2024-10-18

## 2024-10-18 VITALS
DIASTOLIC BLOOD PRESSURE: 68 MMHG | HEIGHT: 69 IN | SYSTOLIC BLOOD PRESSURE: 108 MMHG | HEART RATE: 64 BPM | WEIGHT: 215 LBS | BODY MASS INDEX: 31.84 KG/M2 | TEMPERATURE: 97.6 F

## 2024-10-18 DIAGNOSIS — Z48.89 POSTOPERATIVE VISIT: Primary | ICD-10-CM

## 2024-10-18 PROCEDURE — 99024 POSTOP FOLLOW-UP VISIT: CPT | Performed by: STUDENT IN AN ORGANIZED HEALTH CARE EDUCATION/TRAINING PROGRAM

## 2024-10-18 NOTE — PROGRESS NOTES
"OFFICE VISIT - BARIATRIC SURGERY  Oswald Marvin 49 y.o. male MRN: [id number]  Unit/Bed#:  Encounter: [id number]      HPI:  Oswald Marvin is a 49 y.o. male status post robotic RNYGB HHR by Dr. Eligio Gilbert on 8/12/2024. Comes to the office today with complaints of a \"lump\" under his subxiphoid incision.    Subjective   Patient states that over the course of the last 1-2 weeks, he has noticed a new lump under his incision. He denies any associated pain or signs of local infection. Additionally denies any bleeding or drainage from his incision. He adds that for several days last week, he had trouble keeping food down, however that is now resolving.    Review of Systems   Constitutional:  Negative for chills and fever.   HENT:  Negative for ear pain and sore throat.    Eyes:  Negative for pain and visual disturbance.   Respiratory:  Negative for cough and shortness of breath.    Cardiovascular:  Negative for chest pain and palpitations.   Gastrointestinal:  Negative for abdominal pain, constipation, nausea and vomiting.   Genitourinary:  Negative for dysuria and hematuria.   Musculoskeletal:  Negative for arthralgias and back pain.   Skin:  Negative for color change and rash.   Neurological:  Negative for seizures and syncope.   All other systems reviewed and are negative.      Historical Information   Past Medical History:   Diagnosis Date    Colon polyp     Diabetes (HCC)     Diabetes mellitus (HCC)     GERD (gastroesophageal reflux disease)     Hyperlipidemia     YURIY on CPAP      Past Surgical History:   Procedure Laterality Date    COLONOSCOPY      [place] LAPS ABD PRTM&OMENTUM DX W/WO SPEC BR/WA SPX N/A 08/16/2022    Procedure: LAPAROSCOPY DIAGNOSTIC, LYSIS OF ADHESIONS, REDUCTION OF INTERNAL HERNIA;  Surgeon: Raymond Núñez MD;  [facility];  Service: General    [place] LAPS GSTR RSTCV PX W/BYP HEDY-EN-Y LIMB <150 CM N/A 8/12/2024    Procedure: BYPASS GASTRIC R-N-Y  W ROBOTICS & INTRAOP EGD,PARAESOPHAGEAL " "HERNIA REPAIR;  Surgeon: Heydi Gilbert MD;  Location: [facility];  Service: Bariatrics    VASECTOMY       Social History   Social History     Substance and Sexual Activity   Alcohol Use Never     Social History     Substance and Sexual Activity   Drug Use Never     Social History     Tobacco Use   Smoking Status Former    Current packs/day: 0.00    Average packs/day: 3.0 packs/day for 15.0 years (45.0 ttl pk-yrs)    Types: Cigarettes    Quit date: 3/16/2012    Years since quittin.6    Passive exposure: Never   Smokeless Tobacco Never   Tobacco Comments    8 YRS AGO       Objective       Current Vitals:   Blood Pressure: 108/68 (10/18/24 1422)  Pulse: 64 (10/18/24 142)  Temperature: 97.6 °F (36.4 °C) (10/18/24 1422)  Temp Source: Tympanic (10/18/24 1422)  Height: 5' 9.2\" (175.8 cm) (10/18/24 1422)  Weight - Scale: 97.5 kg (215 lb) (10/18/24 1422)    Invasive Devices       None                   Physical Exam  Constitutional:       General: He is not in acute distress.     Appearance: Normal appearance. He is not ill-appearing or toxic-appearing.   HENT:      Head: Normocephalic and atraumatic.      Right Ear: External ear normal.      Left Ear: External ear normal.      Nose: Nose normal.      Mouth/Throat:      Mouth: Mucous membranes are moist.      Pharynx: Oropharynx is clear.   Eyes:      Extraocular Movements: Extraocular movements intact.      Conjunctiva/sclera: Conjunctivae normal.   Cardiovascular:      Rate and Rhythm: Normal rate.   Pulmonary:      Effort: Pulmonary effort is normal. No respiratory distress.      Breath sounds: No wheezing.   Abdominal:      General: There is no distension.      Palpations: Abdomen is soft.      Tenderness: There is no abdominal tenderness. There is no guarding or rebound.      Comments: 2x2 cm non-tender non-mobile mass palpable just to patient's right and cephalad of the subxiphoid incision. On palpation appears to be continuous with the xiphoid process. " Incisions appear clean/dry/intact without evidence of local infection   Musculoskeletal:         General: No deformity.      Cervical back: Normal range of motion and neck supple.   Skin:     General: Skin is warm and dry.   Neurological:      General: No focal deficit present.      Mental Status: He is alert and oriented to person, place, and time.   Psychiatric:         Mood and Affect: Mood normal.         Behavior: Behavior normal.           Pathology, and Other Studies: Results Review Statement: No pertinent imaging studies reviewed.      Assessment/PLAN:    Oswald Marvin is a 49 y.o. male status post robotic RNYGB HHR on 8/12/2024 with Dr. Eligio Gilbert. He presents to clinic today complaining of a lump underhis subxiphoid incision. On palpation, this appears to bony and in continuity with his xiphoid process. It is asymptomatic. There is no evidence of a subcutaneous fluid collection or local infection. Discussed with the patient that at this time there does not appear to be any indication to pursue further imaging, however if he develops symptoms or if this becomes more bothersome we can consider a CT scan. Patient is in agreement with the plan. He states that his insurance will be changing soon, and would like to keep follow up on an as needed basis for the time being.    --------------------------------------------------------------------  Plan:  - Patient to follow up on an as needed basis              Abiel Faust MD  Bariatric Surgery  10/18/2024  2:52 PM

## 2024-10-18 NOTE — PROGRESS NOTES
Date of surgery:8/12/2024  Procedure:RNY  Performing surgeon:Dr.EL Gilbert    Initial Weight - 256lb  Current Weight - 215  Joaquin Weight -   Total Body Weight Loss (EWL)- 41.7%  EWL% - 49%  TWB % -16%

## 2024-10-22 NOTE — PROGRESS NOTES
"OFFICE VISIT - BARIATRIC SURGERY  Oswald Marvin 49 y.o. male MRN: [id number]  Unit/Bed#:  Encounter: [id number]      HPI:  Oswald Marvin is a 49 y.o. male status post robotic RNYGB HHR by Dr. Eligio Gilbert on 8/12/2024. Comes to the office today with complaints of a \"lump\" under his subxiphoid incision.    Subjective   Patient states that he feels as if the lump under his subxiphoid incision is growing. He denies any associated pain, drainage, trouble swallowing, or fever/chills. He states he has last about 50 pounds since surgery.    Review of Systems   Constitutional:  Negative for chills and fever.   HENT:  Negative for ear pain and sore throat.    Eyes:  Negative for pain and visual disturbance.   Respiratory:  Negative for cough and shortness of breath.    Cardiovascular:  Negative for chest pain and palpitations.   Gastrointestinal:  Negative for abdominal pain and vomiting.   Genitourinary:  Negative for dysuria and hematuria.   Musculoskeletal:  Negative for arthralgias and back pain.   Skin:  Negative for color change and rash.   Neurological:  Negative for seizures and syncope.   All other systems reviewed and are negative.      Historical Information   Past Medical History:   Diagnosis Date    Colon polyp     Diabetes (HCC)     Diabetes mellitus (HCC)     GERD (gastroesophageal reflux disease)     Hyperlipidemia     YURIY on CPAP      Past Surgical History:   Procedure Laterality Date    COLONOSCOPY      [place] LAPS ABD PRTM&OMENTUM DX W/WO SPEC BR/WA SPX N/A 08/16/2022    Procedure: LAPAROSCOPY DIAGNOSTIC, LYSIS OF ADHESIONS, REDUCTION OF INTERNAL HERNIA;  Surgeon: Raymond Núñez MD;  Location[facility];  Service: General    [place] LAPS GSTR RSTCV PX W/BYP HEDY-EN-Y LIMB <150 CM N/A 8/12/2024    Procedure: BYPASS GASTRIC R-N-Y  W ROBOTICS & INTRAOP EGD,PARAESOPHAGEAL HERNIA REPAIR;  Surgeon: Heydi Gilbert MD;  [facility];  Service: Bariatrics    VASECTOMY       Social History   Social History "     Substance and Sexual Activity   Alcohol Use Never     Social History     Substance and Sexual Activity   Drug Use Never     Social History     Tobacco Use   Smoking Status Former    Current packs/day: 0.00    Average packs/day: 3.0 packs/day for 15.0 years (45.0 ttl pk-yrs)    Types: Cigarettes    Quit date: 3/16/2012    Years since quittin.6    Passive exposure: Never   Smokeless Tobacco Never   Tobacco Comments    8 YRS AGO       Objective       Current Vitals:        Invasive Devices       None                   Physical Exam  Constitutional:       General: He is not in acute distress.     Appearance: He is obese. He is not ill-appearing or toxic-appearing.   HENT:      Head: Normocephalic and atraumatic.      Right Ear: External ear normal.      Left Ear: External ear normal.      Nose: Nose normal.      Mouth/Throat:      Mouth: Mucous membranes are moist.      Pharynx: Oropharynx is clear.   Eyes:      Extraocular Movements: Extraocular movements intact.      Conjunctiva/sclera: Conjunctivae normal.   Cardiovascular:      Rate and Rhythm: Normal rate.   Pulmonary:      Effort: Pulmonary effort is normal. No respiratory distress.      Breath sounds: No wheezing.   Abdominal:      General: There is no distension.      Palpations: Abdomen is soft.      Tenderness: There is no abdominal tenderness. There is no guarding or rebound.      Comments: Incisions appear clean/dry/intact without evidence of local infection, bleeding, or drainage. Xiphoid process palpable     Musculoskeletal:         General: No deformity.      Cervical back: Normal range of motion and neck supple.   Skin:     General: Skin is warm and dry.   Neurological:      General: No focal deficit present.      Mental Status: He is alert and oriented to person, place, and time.   Psychiatric:         Mood and Affect: Mood normal.         Behavior: Behavior normal.           Pathology, and Other Studies: Results Review Statement: No pertinent  imaging studies reviewed.      Assessment/PLAN:    Oswald Marvin is a 49 y.o. male status post robotic RNYGB HHR on 8/12/2024 with Dr. Eligio Gilbert. He presents to clinic today complaining of a lump under his subxiphoid incision.  Discussed with the patient that this is likely his xiphoid process. Will plan to order CT imaging for further evaluation.  --------------------------------------------------------------------  Plan:  - CT chest for further evaluation of lump under subxiphoid incision  - Follow up as needed              Abiel Faust MD  Bariatric Surgery  10/24/2024  9:14 AM

## 2024-10-24 ENCOUNTER — OFFICE VISIT (OUTPATIENT)
Dept: BARIATRICS | Facility: CLINIC | Age: 49
End: 2024-10-24

## 2024-10-24 VITALS
WEIGHT: 210.5 LBS | HEART RATE: 73 BPM | DIASTOLIC BLOOD PRESSURE: 68 MMHG | HEIGHT: 69 IN | BODY MASS INDEX: 31.18 KG/M2 | TEMPERATURE: 97.4 F | SYSTOLIC BLOOD PRESSURE: 104 MMHG

## 2024-10-24 DIAGNOSIS — Z48.89 POSTOPERATIVE VISIT: Primary | ICD-10-CM

## 2024-10-24 PROCEDURE — 99024 POSTOP FOLLOW-UP VISIT: CPT | Performed by: SURGERY

## 2024-10-24 NOTE — PROGRESS NOTES
Date of surgery:8/12/2024  Procedure: RNY  Performing surgeon: Dr. JAS Gilbert    Initial Weight - 256lb  Current Weight - 210.5 lb  Joaquin Weight - now  Total Body Weight Loss (EWL)- 46.3%  EWL% - 54%  TWB % -18%

## 2024-10-27 ENCOUNTER — PATIENT MESSAGE (OUTPATIENT)
Dept: FAMILY MEDICINE CLINIC | Facility: CLINIC | Age: 49
End: 2024-10-27

## 2024-10-27 DIAGNOSIS — E11.9 TYPE 2 DIABETES MELLITUS WITHOUT COMPLICATION, WITHOUT LONG-TERM CURRENT USE OF INSULIN (HCC): Primary | ICD-10-CM

## 2024-10-28 ENCOUNTER — TELEPHONE (OUTPATIENT)
Dept: BARIATRICS | Facility: CLINIC | Age: 49
End: 2024-10-28

## 2024-10-28 ENCOUNTER — HOSPITAL ENCOUNTER (OUTPATIENT)
Dept: RADIOLOGY | Age: 49
Discharge: HOME | End: 2024-10-28
Payer: COMMERCIAL

## 2024-10-28 DIAGNOSIS — Z48.89 POSTOPERATIVE VISIT: ICD-10-CM

## 2024-10-28 PROCEDURE — 71260 CT THORAX DX C+: CPT

## 2024-10-28 RX ADMIN — IOHEXOL 85 ML: 350 INJECTION, SOLUTION INTRAVENOUS at 15:14

## 2024-10-29 ENCOUNTER — APPOINTMENT (OUTPATIENT)
Dept: LAB | Facility: CLINIC | Age: 49
End: 2024-10-29
Payer: COMMERCIAL

## 2024-10-29 DIAGNOSIS — E11.9 TYPE 2 DIABETES MELLITUS WITHOUT COMPLICATION, WITHOUT LONG-TERM CURRENT USE OF INSULIN (HCC): ICD-10-CM

## 2024-10-29 LAB
ALBUMIN SERPL BCG-MCNC: 4.4 G/DL (ref 3.5–5)
ALP SERPL-CCNC: 117 U/L (ref 34–104)
ALT SERPL W P-5'-P-CCNC: 14 U/L (ref 7–52)
ANION GAP SERPL CALCULATED.3IONS-SCNC: 7 MMOL/L (ref 4–13)
AST SERPL W P-5'-P-CCNC: 17 U/L (ref 13–39)
BASOPHILS # BLD AUTO: 0.05 THOUSANDS/ΜL (ref 0–0.1)
BASOPHILS NFR BLD AUTO: 1 % (ref 0–1)
BILIRUB SERPL-MCNC: 0.39 MG/DL (ref 0.2–1)
BUN SERPL-MCNC: 12 MG/DL (ref 5–25)
CALCIUM SERPL-MCNC: 9.6 MG/DL (ref 8.4–10.2)
CHLORIDE SERPL-SCNC: 103 MMOL/L (ref 96–108)
CHOLEST SERPL-MCNC: 231 MG/DL
CO2 SERPL-SCNC: 31 MMOL/L (ref 21–32)
CREAT SERPL-MCNC: 0.99 MG/DL (ref 0.6–1.3)
CREAT UR-MCNC: 250.3 MG/DL
EOSINOPHIL # BLD AUTO: 0.32 THOUSAND/ΜL (ref 0–0.61)
EOSINOPHIL NFR BLD AUTO: 5 % (ref 0–6)
ERYTHROCYTE [DISTWIDTH] IN BLOOD BY AUTOMATED COUNT: 12.8 % (ref 11.6–15.1)
EST. AVERAGE GLUCOSE BLD GHB EST-MCNC: 114 MG/DL
GFR SERPL CREATININE-BSD FRML MDRD: 90 ML/MIN/1.73SQ M
GLUCOSE P FAST SERPL-MCNC: 96 MG/DL (ref 65–99)
HBA1C MFR BLD: 5.6 %
HCT VFR BLD AUTO: 44.2 % (ref 36.5–49.3)
HDLC SERPL-MCNC: 41 MG/DL
HGB BLD-MCNC: 14.1 G/DL (ref 12–17)
IMM GRANULOCYTES # BLD AUTO: 0.02 THOUSAND/UL (ref 0–0.2)
IMM GRANULOCYTES NFR BLD AUTO: 0 % (ref 0–2)
LDLC SERPL CALC-MCNC: 171 MG/DL (ref 0–100)
LYMPHOCYTES # BLD AUTO: 2.1 THOUSANDS/ΜL (ref 0.6–4.47)
LYMPHOCYTES NFR BLD AUTO: 33 % (ref 14–44)
MCH RBC QN AUTO: 26.6 PG (ref 26.8–34.3)
MCHC RBC AUTO-ENTMCNC: 31.9 G/DL (ref 31.4–37.4)
MCV RBC AUTO: 83 FL (ref 82–98)
MICROALBUMIN UR-MCNC: 11.9 MG/L
MICROALBUMIN/CREAT 24H UR: 5 MG/G CREATININE (ref 0–30)
MONOCYTES # BLD AUTO: 0.52 THOUSAND/ΜL (ref 0.17–1.22)
MONOCYTES NFR BLD AUTO: 8 % (ref 4–12)
NEUTROPHILS # BLD AUTO: 3.45 THOUSANDS/ΜL (ref 1.85–7.62)
NEUTS SEG NFR BLD AUTO: 53 % (ref 43–75)
NRBC BLD AUTO-RTO: 0 /100 WBCS
PLATELET # BLD AUTO: 322 THOUSANDS/UL (ref 149–390)
PMV BLD AUTO: 10.6 FL (ref 8.9–12.7)
POTASSIUM SERPL-SCNC: 4.4 MMOL/L (ref 3.5–5.3)
PROT SERPL-MCNC: 7.4 G/DL (ref 6.4–8.4)
RBC # BLD AUTO: 5.3 MILLION/UL (ref 3.88–5.62)
SODIUM SERPL-SCNC: 141 MMOL/L (ref 135–147)
TRIGL SERPL-MCNC: 97 MG/DL
TSH SERPL DL<=0.05 MIU/L-ACNC: 1.37 UIU/ML (ref 0.45–4.5)
WBC # BLD AUTO: 6.46 THOUSAND/UL (ref 4.31–10.16)

## 2024-10-29 PROCEDURE — 84443 ASSAY THYROID STIM HORMONE: CPT

## 2024-10-29 PROCEDURE — 80061 LIPID PANEL: CPT

## 2024-10-29 PROCEDURE — 83036 HEMOGLOBIN GLYCOSYLATED A1C: CPT

## 2024-10-29 PROCEDURE — 82570 ASSAY OF URINE CREATININE: CPT

## 2024-10-29 PROCEDURE — 85025 COMPLETE CBC W/AUTO DIFF WBC: CPT

## 2024-10-29 PROCEDURE — 80053 COMPREHEN METABOLIC PANEL: CPT

## 2024-10-29 PROCEDURE — 36415 COLL VENOUS BLD VENIPUNCTURE: CPT

## 2024-10-29 PROCEDURE — 82043 UR ALBUMIN QUANTITATIVE: CPT

## 2024-12-02 ENCOUNTER — TELEPHONE (OUTPATIENT)
Dept: BARIATRICS | Facility: CLINIC | Age: 49
End: 2024-12-02

## 2024-12-03 DIAGNOSIS — K21.9 GASTROESOPHAGEAL REFLUX DISEASE WITHOUT ESOPHAGITIS: Primary | ICD-10-CM

## 2024-12-04 RX ORDER — OMEPRAZOLE 40 MG/1
40 CAPSULE, DELAYED RELEASE ORAL
Qty: 30 CAPSULE | Refills: 2 | Status: SHIPPED | OUTPATIENT
Start: 2024-12-04

## 2024-12-14 DIAGNOSIS — F41.9 ANXIETY: ICD-10-CM

## 2024-12-16 RX ORDER — VENLAFAXINE HYDROCHLORIDE 75 MG/1
CAPSULE, EXTENDED RELEASE ORAL
Qty: 30 CAPSULE | Refills: 1 | Status: SHIPPED | OUTPATIENT
Start: 2024-12-16

## 2024-12-17 NOTE — TELEPHONE ENCOUNTER
Rick to schedule follow up with Dr. Hernandez-3rd call-12/20/24  Lmom to schedule follow up with Dr. Hernandez-2nd call-12/18/24  Elverom to schedule follow up with Dr. Hernandez

## 2024-12-30 ENCOUNTER — OFFICE VISIT (OUTPATIENT)
Dept: URGENT CARE | Age: 49
End: 2024-12-30
Payer: COMMERCIAL

## 2024-12-30 VITALS
HEART RATE: 96 BPM | TEMPERATURE: 98.9 F | BODY MASS INDEX: 28.92 KG/M2 | WEIGHT: 197 LBS | RESPIRATION RATE: 16 BRPM | SYSTOLIC BLOOD PRESSURE: 114 MMHG | OXYGEN SATURATION: 98 % | DIASTOLIC BLOOD PRESSURE: 80 MMHG

## 2024-12-30 DIAGNOSIS — J02.9 SORE THROAT: ICD-10-CM

## 2024-12-30 DIAGNOSIS — J02.0 STREP PHARYNGITIS: Primary | ICD-10-CM

## 2024-12-30 LAB — S PYO AG THROAT QL: POSITIVE

## 2024-12-30 PROCEDURE — 87880 STREP A ASSAY W/OPTIC: CPT

## 2024-12-30 PROCEDURE — G0382 LEV 3 HOSP TYPE B ED VISIT: HCPCS

## 2024-12-30 RX ORDER — AMOXICILLIN 500 MG/1
500 CAPSULE ORAL EVERY 12 HOURS SCHEDULED
Qty: 20 CAPSULE | Refills: 0 | Status: SHIPPED | OUTPATIENT
Start: 2024-12-30 | End: 2025-01-09

## 2024-12-30 NOTE — PATIENT INSTRUCTIONS
Please complete full 10 days of antibiotic therapy.  After 3 days of antibiotic therapy, please throw away your current toothbrush and begin using a new one.   Please  alternate ibuprofen and Tylenol as needed for fever and pain, and ensure adequate fluid intake and urine output.  1.  Drink plenty fluids.    2.  Take probiotics [i.e. Yogurt, Acidophilus, Florastor (liquid)] daily.    3.  Over-the-counter medications as needed for symptomatic care.    4.   Advance activities as tolerated.    5.   Follow-up with your primary care physician in 3-4 days.    6.  Go to emergency room if symptoms are worsening.    7.  Use a humidifier at bedtime

## 2024-12-30 NOTE — PROGRESS NOTES
[facility]        NAME: Oswald Marvin is a 49 y.o. male  : 1975    MRN: [id number]  DATE: 2024  TIME: 5:01 PM    Assessment and Plan   Strep pharyngitis [J02.0]  1. Strep pharyngitis  amoxicillin (AMOXIL) 500 mg capsule      2. Sore throat  POCT rapid ANTIGEN strepA            Patient Instructions     Please complete full 10 days of antibiotic therapy.  After 3 days of antibiotic therapy, please throw away your current toothbrush and begin using a new one.   Follow up with PCP in 3-5 days.  Proceed to  ER if symptoms worsen.    If tests are performed, our office will contact you with results only if changes need to made to the care plan discussed with you at the visit. You can review your full results on [facility].    Chief Complaint     Chief Complaint   Patient presents with    Cold Like Symptoms     Sore throat and off and on fevers and chills x 2 days          History of Present Illness         49-year-old male presents for evaluation of sore throat.  Patient states over the past 2 days he has been experiencing subjective fevers, fatigue, chills, sore throat, bodyaches and headaches.  He has been taking [place] with mild relief of his symptoms.  He states his Tmax was 99.9.        Review of Systems   Review of Systems   Constitutional:  Positive for chills, fatigue and fever.   HENT:  Positive for sore throat.    Respiratory:  Negative for cough and shortness of breath.    Cardiovascular:  Negative for chest pain.   Gastrointestinal:  Negative for diarrhea, nausea and vomiting.   Musculoskeletal:  Positive for myalgias.   Neurological:  Positive for headaches.   All other systems reviewed and are negative.        Current Medications       Current Outpatient Medications:     amoxicillin (AMOXIL) 500 mg capsule, Take 1 capsule (500 mg total) by mouth every 12 (twelve) hours for 10 days, Disp: 20 capsule, Rfl: 0    atorvastatin (LIPITOR) 20 mg tablet, Take 1 tablet (20 mg  total) by mouth daily (Patient not taking: Reported on 8/23/2024), Disp: 90 tablet, Rfl: 1    Blood Glucose Monitoring Suppl (OneTouch Verio Reflect) w/Device KIT, Check blood sugars once daily. Please substitute with appropriate alternative as covered by patient's insurance. Dx: E11.65, Disp: 1 kit, Rfl: 0    multivitamin (THERAGRAN) TABS, Take 1 tablet by mouth daily, Disp: , Rfl:     omeprazole (PriLOSEC) 20 mg delayed release capsule, Take 1 capsule (20 mg total) by mouth daily, Disp: 90 capsule, Rfl: 1    omeprazole (PriLOSEC) 40 MG capsule, TAKE 1 CAPSULE BY MOUTH EVERY DAY BEFORE BREAKFAST, Disp: 30 capsule, Rfl: 2    OneTouch Delica Lancets 33G MISC, Check blood sugars once daily. Please substitute with appropriate alternative as covered by patient's insurance. Dx: E11.65, Disp: 100 each, Rfl: 3    oxyCODONE (Roxicodone) 5 immediate release tablet, Take 1 tablet (5 mg total) by mouth every 4 (four) hours as needed for moderate pain Max Daily Amount: 30 mg Do not start before August 13, 2024. (Patient not taking: Reported on 8/23/2024), Disp: 5 tablet, Rfl: 0    venlafaxine (EFFEXOR) 37.5 mg tablet, Take 1 tablet (37.5 mg total) by mouth 2 (two) times a day for 7 days, Disp: 14 tablet, Rfl: 0    venlafaxine (EFFEXOR-XR) 75 mg 24 hr capsule, TAKE 1 CAPSULE BY MOUTH DAILY WITH BREAKFAST., Disp: 30 capsule, Rfl: 1    Current Allergies     Allergies as of 12/30/2024    (Not on File)            The following portions of the patient's history were reviewed and updated as appropriate: allergies, current medications, past family history, past medical history, past social history, past surgical history and problem list.     Past Medical History:   Diagnosis Date    Colon polyp     Diabetes (HCC)     Diabetes mellitus (HCC)     GERD (gastroesophageal reflux disease)     Hyperlipidemia     YURIY on CPAP        Past Surgical History:   Procedure Laterality Date    COLONOSCOPY      [place] LAPS ABD PRTM&OMENTUM DX W/WO SPEC  BR/WA SPX N/A 08/16/2022    Procedure: LAPAROSCOPY DIAGNOSTIC, LYSIS OF ADHESIONS, REDUCTION OF INTERNAL HERNIA;  Surgeon: Raymond Núñez MD;  [facility];  Service: General    [place] LAPS GSTR RSTCV PX W/BYP HEDY-EN-Y LIMB <150 CM N/A 8/12/2024    Procedure: BYPASS GASTRIC R-N-Y  W ROBOTICS & INTRAOP EGD,PARAESOPHAGEAL HERNIA REPAIR;  Surgeon: Heydi Gilbert MD;  [facility];  Service: Bariatrics    VASECTOMY         Family History   Problem Relation Age of Onset    Liver disease Mother     Emphysema Father          Medications have been verified.        Objective   /80 (BP Location: Left arm, Patient Position: Sitting, Cuff Size: Adult)   Pulse 96   Temp 98.9 °F (37.2 °C) (Tympanic)   Resp 16   Wt 89.4 kg (197 lb)   SpO2 98%   BMI 28.92 kg/m²        Physical Exam     Physical Exam  Vitals and nursing note reviewed.   Constitutional:       General: He is not in acute distress.     Appearance: Normal appearance. He is not ill-appearing, toxic-appearing or diaphoretic.   HENT:      Head: Normocephalic and atraumatic.      Nose: Nose normal. No congestion or rhinorrhea.      Mouth/Throat:      Mouth: Mucous membranes are moist.      Pharynx: Oropharynx is clear. Uvula midline. Posterior oropharyngeal erythema present. No oropharyngeal exudate or uvula swelling.      Tonsils: 3+ on the right. 3+ on the left.   Cardiovascular:      Rate and Rhythm: Normal rate and regular rhythm.      Pulses: Normal pulses.      Heart sounds: Normal heart sounds. No murmur heard.     No friction rub. No gallop.   Pulmonary:      Effort: Pulmonary effort is normal. No respiratory distress.      Breath sounds: Normal breath sounds. No stridor. No wheezing, rhonchi or rales.   Chest:      Chest wall: No tenderness.   Abdominal:      General: Bowel sounds are normal.      Palpations: Abdomen is soft.      Tenderness: There is no abdominal tenderness.   Musculoskeletal:      Cervical back: Normal range of motion.    Lymphadenopathy:      Cervical: Cervical adenopathy present.   Skin:     General: Skin is warm and dry.   Neurological:      Mental Status: He is alert.   Psychiatric:         Mood and Affect: Mood normal.         Behavior: Behavior normal.

## 2025-01-02 DIAGNOSIS — K21.9 GASTROESOPHAGEAL REFLUX DISEASE WITHOUT ESOPHAGITIS: ICD-10-CM

## 2025-01-02 DIAGNOSIS — E66.01 MORBID (SEVERE) OBESITY DUE TO EXCESS CALORIES (HCC): ICD-10-CM

## 2025-01-02 DIAGNOSIS — Z98.84 BARIATRIC SURGERY STATUS: ICD-10-CM

## 2025-01-12 DIAGNOSIS — K21.9 GASTROESOPHAGEAL REFLUX DISEASE WITHOUT ESOPHAGITIS: ICD-10-CM

## 2025-01-14 RX ORDER — OMEPRAZOLE 40 MG/1
40 CAPSULE, DELAYED RELEASE ORAL
Qty: 30 CAPSULE | Refills: 11 | Status: SHIPPED | OUTPATIENT
Start: 2025-01-14

## 2025-01-22 ENCOUNTER — OFFICE VISIT (OUTPATIENT)
Dept: URGENT CARE | Age: 50
End: 2025-01-22
Payer: COMMERCIAL

## 2025-01-22 VITALS
HEART RATE: 94 BPM | HEIGHT: 69 IN | WEIGHT: 193 LBS | DIASTOLIC BLOOD PRESSURE: 60 MMHG | BODY MASS INDEX: 28.58 KG/M2 | OXYGEN SATURATION: 96 % | RESPIRATION RATE: 18 BRPM | TEMPERATURE: 96.7 F | SYSTOLIC BLOOD PRESSURE: 118 MMHG

## 2025-01-22 DIAGNOSIS — L03.90 WOUND CELLULITIS: Primary | ICD-10-CM

## 2025-01-22 PROCEDURE — 99213 OFFICE O/P EST LOW 20 MIN: CPT

## 2025-01-22 RX ORDER — CEPHALEXIN 500 MG/1
500 CAPSULE ORAL EVERY 8 HOURS SCHEDULED
Qty: 21 CAPSULE | Refills: 0 | Status: SHIPPED | OUTPATIENT
Start: 2025-01-22 | End: 2025-01-29

## 2025-01-22 NOTE — PATIENT INSTRUCTIONS
Please begin antibiotics as directed.   Discontinue hydrogen peroxide and hydrocortisone cream.   Follow up with wound care as directed.   Follow up with PCP if no relief within one week.

## 2025-01-22 NOTE — PROGRESS NOTES
Assessment/Plan  Please begin antibiotics as directed.   Discontinue hydrogen peroxide and hydrocortisone cream.   Follow up with wound care as directed.   Follow up with PCP if no relief within one week.     Wound cellulitis [L03.90]  1. Wound cellulitis  cephalexin (KEFLEX) 500 mg capsule    Ambulatory Referral to Wound Care      Patient Education     Cellulitis (Skin Infection), Adult ED   General Information   You came to the Emergency Department (ED) for a skin infection. All people have germs on their skin. Most of the time, these germs do not cause a problem. A skin infection means the germs have gotten into the layers of your skin. You need antibiotics to treat the infection. It is important to take all of your antibiotics even if you start to feel better. If not, your infection may come back and be more serious than before.  What care is needed at home?   Call your regular doctor to let them know you were in the ED. Make a follow-up appointment if you were told to.  Prop your painful body part on pillows, keeping it above the level of your heart. This may help lessen pain and swelling.  Keep your infected area clean and dry. You can gently wash the area with soap and water or take a shower. Pat the area dry with a clean towel.  Wash your hands before and after you touch your infected area. However, someone cannot catch cellulitis from someone else.  Do not put an antibiotic ointment on the infected area.  When do I need to call the doctor?   You have a fever of 100.4°F (38°C) or higher or chills.  The area becomes more red, swollen, or painful.  The redness or swelling spreads up your leg or arm or to a larger area.  The infected area is not better after 3 days of taking antibiotics.  You have new or worsening symptoms.  Last Reviewed Date   2021-03-29  Consumer Information Use and Disclaimer   This generalized information is a limited summary of diagnosis, treatment, and/or medication information. It is  not meant to be comprehensive and should be used as a tool to help the user understand and/or assess potential diagnostic and treatment options. It does NOT include all information about conditions, treatments, medications, side effects, or risks that may apply to a specific patient. It is not intended to be medical advice or a substitute for the medical advice, diagnosis, or treatment of a health care provider based on the health care provider's examination and assessment of a patient’s specific and unique circumstances. Patients must speak with a health care provider for complete information about their health, medical questions, and treatment options, including any risks or benefits regarding use of medications. This information does not endorse any treatments or medications as safe, effective, or approved for treating a specific patient. [employer] and its affiliates disclaim any warranty or liability relating to this information or the use thereof. The use of this information is governed by the Terms of Use, available at https://www.[employer]/en/know/clinical-effectiveness-terms   Copyright   Copyright © 2024 [employer] and its affiliates and/or licensors. All rights reserved.      Subjective:     Patient ID: Oswald Marvin is a 49 y.o. male.      Reason For Visit / Chief Complaint  Chief Complaint   Patient presents with    Wound Infection     Pt presents with linear scratch to medial left lower leg. Redness and swelling to recurrent wound. Pt reports redness has been for past two days; denies fevers.          Wound Check  He was originally treated more than 14 days ago (x 1 month). Previous treatment included wound cleansing or irrigation (Hydrogen peroxide spray, hydrocortisone cream). His temperature was unmeasured prior to arrival. There has been no drainage from the wound. The redness has worsened. The swelling has worsened. The pain has worsened. He has no difficulty moving the affected  extremity or digit.           Past Medical History:   Diagnosis Date    Colon polyp     Diabetes (HCC)     Diabetes mellitus (HCC)     GERD (gastroesophageal reflux disease)     Hyperlipidemia     YURIY on CPAP        Past Surgical History:   Procedure Laterality Date    COLONOSCOPY      FL LAPS ABD PRTM&OMENTUM DX W/WO SPEC BR/WA SPX N/A 08/16/2022    Procedure: LAPAROSCOPY DIAGNOSTIC, LYSIS OF ADHESIONS, REDUCTION OF INTERNAL HERNIA;  Surgeon: Raymond Núñez MD;  Location: [facility];  Service: General    FL LAPS GSTR RSTCV PX W/BYP HEDY-EN-Y LIMB <150 CM N/A 8/12/2024    Procedure: BYPASS GASTRIC R-N-Y  W ROBOTICS & INTRAOP EGD,PARAESOPHAGEAL HERNIA REPAIR;  Surgeon: Heydi Gilbert MD;  Location: [facility];  Service: Bariatrics    VASECTOMY         Family History   Problem Relation Age of Onset    Liver disease Mother     Emphysema Father        Review of Systems   Constitutional:  Negative for fatigue and fever.   HENT:  Negative for congestion, ear discharge, ear pain, postnasal drip, rhinorrhea, sinus pressure, sinus pain, sneezing and sore throat.    Eyes: Negative.  Negative for pain, discharge, redness and itching.   Respiratory: Negative.  Negative for apnea, cough, choking, chest tightness, shortness of breath, wheezing and stridor.    Cardiovascular: Negative.  Negative for chest pain and palpitations.   Gastrointestinal: Negative.  Negative for diarrhea, nausea and vomiting.   Endocrine: Negative.  Negative for polydipsia, polyphagia and polyuria.   Genitourinary: Negative.  Negative for decreased urine volume and flank pain.   Musculoskeletal: Negative.  Negative for arthralgias, back pain, myalgias, neck pain and neck stiffness.   Skin:  Positive for color change and wound. Negative for pallor and rash.   Allergic/Immunologic: Negative.  Negative for environmental allergies.   Neurological: Negative.  Negative for dizziness, facial asymmetry, light-headedness, numbness and headaches.   Hematological:  "Negative.  Negative for adenopathy.   Psychiatric/Behavioral: Negative.         Objective:    /60   Pulse 94   Temp (!) 96.7 °F (35.9 °C)   Resp 18   Ht 5' 9\" (1.753 m)   Wt 87.5 kg (193 lb)   SpO2 96%   BMI 28.50 kg/m²     Physical Exam  Vitals reviewed.   Constitutional:       General: He is not in acute distress.     Appearance: Normal appearance. He is not ill-appearing, toxic-appearing or diaphoretic.      Interventions: He is not intubated.  HENT:      Head: Normocephalic and atraumatic.      Right Ear: Tympanic membrane, ear canal and external ear normal. There is no impacted cerumen.      Left Ear: Tympanic membrane, ear canal and external ear normal. There is no impacted cerumen.      Nose: Nose normal. No congestion or rhinorrhea.      Mouth/Throat:      Mouth: Mucous membranes are moist.      Pharynx: Oropharynx is clear. Uvula midline. No pharyngeal swelling, oropharyngeal exudate, posterior oropharyngeal erythema or uvula swelling.      Tonsils: No tonsillar exudate or tonsillar abscesses. 1+ on the right. 1+ on the left.   Eyes:      Extraocular Movements: Extraocular movements intact.      Conjunctiva/sclera: Conjunctivae normal.      Pupils: Pupils are equal, round, and reactive to light.   Cardiovascular:      Rate and Rhythm: Normal rate and regular rhythm.      Pulses: Normal pulses.           Dorsalis pedis pulses are 2+ on the left side.      Heart sounds: Normal heart sounds, S1 normal and S2 normal. Heart sounds not distant. No murmur heard.     No friction rub. No gallop.   Pulmonary:      Effort: Pulmonary effort is normal. No tachypnea, bradypnea, accessory muscle usage, prolonged expiration, respiratory distress or retractions. He is not intubated.      Breath sounds: Normal breath sounds. No stridor, decreased air movement or transmitted upper airway sounds. No decreased breath sounds, wheezing, rhonchi or rales.   Chest:      Chest wall: No tenderness.   Musculoskeletal:    "      General: Normal range of motion.      Cervical back: Normal range of motion and neck supple. No rigidity or tenderness.        Legs:       Comments: There is an approximately 5 cm x 2 cm vertical wound of left medial ankle with some brownish eschar and surrounding erythema. Mild swelling of the medial aspect of the ankle, no drainage.    Feet:      Left foot:      Skin integrity: Skin integrity normal.      Comments: Full sensation of bilateral feet and lower legs.   Lymphadenopathy:      Cervical: No cervical adenopathy.   Skin:     General: Skin is warm and dry.      Capillary Refill: Capillary refill takes less than 2 seconds.      Findings: No erythema.   Neurological:      General: No focal deficit present.      Mental Status: He is alert.   Psychiatric:         Mood and Affect: Mood normal.

## 2025-01-23 ENCOUNTER — TELEPHONE (OUTPATIENT)
Dept: FAMILY MEDICINE CLINIC | Facility: CLINIC | Age: 50
End: 2025-01-23

## 2025-01-23 NOTE — TELEPHONE ENCOUNTER
Patient has moved to [place]. No longer coming to [facility]. Please remove DR. Hernandez as his PCP

## 2025-01-26 NOTE — TELEPHONE ENCOUNTER
01/25/25 10:47 PM        The office's request has been received, reviewed, and the patient chart updated. The PCP has successfully been removed with a patient attribution note. This message will now be completed.        Thank you  Bryan Emerson

## 2025-05-12 ENCOUNTER — TELEPHONE (OUTPATIENT)
Dept: CARDIOLOGY CLINIC | Facility: CLINIC | Age: 50
End: 2025-05-12

## 2025-05-12 NOTE — TELEPHONE ENCOUNTER
Dr. Jones not going to be in the office on 7/7.  Pt didn't know why he was coming for a 1 year f/u he was seen for baratric surgery and doesn't need a f/u

## 2025-05-13 ENCOUNTER — TELEPHONE (OUTPATIENT)
Age: 50
End: 2025-05-13

## 2025-05-13 NOTE — TELEPHONE ENCOUNTER
Patient of Dr Eligio Gilbert s/p RNY 8/12/2024.    Patient would like to get Colon Hydrotherapy done at [facility] in [place]. He was told to ask his provider if this is safe with his surgical history.    Please advise  #[phone]

## 2025-05-16 ENCOUNTER — NURSE TRIAGE (OUTPATIENT)
Dept: BARIATRICS | Facility: CLINIC | Age: 50
End: 2025-05-16

## 2025-05-23 NOTE — PATIENT COMMUNICATION
PT called in following up on lack of response of [employer] message sent x7 days ago.     Please call PT back: [phone]

## 2025-06-10 ENCOUNTER — TELEPHONE (OUTPATIENT)
Dept: SURGERY | Facility: HOSPITAL | Age: 50
End: 2025-06-10

## 2025-06-10 NOTE — TELEPHONE ENCOUNTER
Patient of Dr.El Gilbert. S/P Gaastric Bypass on 8/12/24. Recently has lab work done at [facility] per his PCP. Calling to discuss results. Labs not in his chart. Will contact hospital and have results faxed to Dr.El Gilbert

## (undated) DEVICE — ELECTRO LUBE IS A SINGLE PATIENT USE DEVICE THAT IS INTENDED TO BE USED ON ELECTROSURGICAL ELECTRODES TO REDUCE STICKING.: Brand: KEY SURGICAL ELECTRO LUBE

## (undated) DEVICE — VIOLET BRAIDED (POLYGLACTIN 910), SYNTHETIC ABSORBABLE SUTURE: Brand: COATED VICRYL

## (undated) DEVICE — TROCAR: Brand: KII FIOS FIRST ENTRY

## (undated) DEVICE — 10 MM BABCOCKS WITH RATCHET HANDLES: Brand: ENDOPATH

## (undated) DEVICE — Device: Brand: DEFENDO AIR/WATER/SUCTION AND BIOPSY VALVE

## (undated) DEVICE — SUT ETHIBOND 0 CT-1 30 IN X424H

## (undated) DEVICE — PBT NON ABSORBABLE WOUND CLOSURE DEVICE: Brand: V-LOC

## (undated) DEVICE — 3000CC GUARDIAN II: Brand: GUARDIAN

## (undated) DEVICE — GLOVE SRG BIOGEL 7.5

## (undated) DEVICE — ASTOUND STANDARD SURGICAL GOWN, XL: Brand: CONVERTORS

## (undated) DEVICE — SPRAY SET ENDO FLEX APPLICATOR 40CM

## (undated) DEVICE — STAPLER 60: Brand: SUREFORM

## (undated) DEVICE — STAPLER 60 RELOAD BLUE: Brand: SUREFORM

## (undated) DEVICE — MONOPOLAR CURVED SCISSORS: Brand: ENDOWRIST

## (undated) DEVICE — ABSORBABLE WOUND CLOSURE DEVICE: Brand: V-LOC 90

## (undated) DEVICE — BETHLEHEM UNIVERSAL LAPAROTOMY: Brand: CARDINAL HEALTH

## (undated) DEVICE — SUT MONOCRYL 4-0 PS-2 27 IN Y426H

## (undated) DEVICE — REDUCER: Brand: ENDOWRIST

## (undated) DEVICE — BLUE HEAT SCOPE WARMER

## (undated) DEVICE — DRAPE EQUIPMENT RF WAND

## (undated) DEVICE — DISPOSABLE OR TOWEL: Brand: CARDINAL HEALTH

## (undated) DEVICE — MEGA SUTURECUT ND: Brand: ENDOWRIST

## (undated) DEVICE — STAPLER CANNULA SEAL: Brand: ENDOWRIST

## (undated) DEVICE — VISIGI 3D®  CALIBRATION SYSTEM  SIZE 36FR BYPASS/SHORT: Brand: BOEHRINGER® VISIGI 3D®  CALIBRATION SYSTEM  SIZE 36FR BYPASS/SHORT

## (undated) DEVICE — 2000CC GUARDIAN II: Brand: GUARDIAN

## (undated) DEVICE — KIT, ROBOTIC BARIATRIC: Brand: CARDINAL HEALTH

## (undated) DEVICE — URETERAL CATHETER ADAPTOR TIP

## (undated) DEVICE — STRL COTTON TIP APPLCTR 6IN PK: Brand: CARDINAL HEALTH

## (undated) DEVICE — VESSEL SEALER EXTEND: Brand: ENDOWRIST

## (undated) DEVICE — TIP COVER ACCESSORY

## (undated) DEVICE — PROXIMATE SKIN STAPLERS (35 WIDE) CONTAINS 35 STAINLESS STEEL STAPLES (FIXED HEAD): Brand: PROXIMATE

## (undated) DEVICE — GLOVE INDICATOR PI UNDERGLOVE SZ 8 BLUE

## (undated) DEVICE — EXOFIN PRECISION PEN HIGH VISCOSITY TOPICAL SKIN ADHESIVE: Brand: EXOFIN PRECISION PEN, 1G

## (undated) DEVICE — SCD SEQUENTIAL COMPRESSION COMFORT SLEEVE MEDIUM KNEE LENGTH: Brand: KENDALL SCD

## (undated) DEVICE — CADIERE FORCEPS: Brand: ENDOWRIST

## (undated) DEVICE — CANNULA SEAL

## (undated) DEVICE — Device

## (undated) DEVICE — STAPLER 60 RELOAD WHITE: Brand: SUREFORM

## (undated) DEVICE — DECANTER: Brand: UNBRANDED

## (undated) DEVICE — INTENDED FOR TISSUE SEPARATION, AND OTHER PROCEDURES THAT REQUIRE A SHARP SURGICAL BLADE TO PUNCTURE OR CUT.: Brand: BARD-PARKER SAFETY BLADES SIZE 11, STERILE

## (undated) DEVICE — TROCAR: Brand: KII SLEEVE

## (undated) DEVICE — ENDOPATH 5MM CURVED SCISSORS WITH MONOPOLAR CAUTERY: Brand: ENDOPATH

## (undated) DEVICE — ADHESIVE SKIN HIGH VISCOSITY EXOFIN 1ML

## (undated) DEVICE — SEALANT FIBRIN VISTASEAL 10ML

## (undated) DEVICE — TUBE SET SMOKE EVAC PNEUMOCLEAR HIGH FLOW

## (undated) DEVICE — ARM DRAPE

## (undated) DEVICE — WEBRIL 6 IN UNSTERILE

## (undated) DEVICE — BLADELESS OBTURATOR: Brand: WECK VISTA

## (undated) DEVICE — COLUMN DRAPE